# Patient Record
Sex: FEMALE | Race: WHITE | NOT HISPANIC OR LATINO | Employment: OTHER | URBAN - METROPOLITAN AREA
[De-identification: names, ages, dates, MRNs, and addresses within clinical notes are randomized per-mention and may not be internally consistent; named-entity substitution may affect disease eponyms.]

---

## 2017-02-17 ENCOUNTER — ANESTHESIA EVENT (OUTPATIENT)
Dept: SURGERY | Facility: CLINIC | Age: 82
End: 2017-02-17
Payer: MEDICARE

## 2017-02-17 ENCOUNTER — ANESTHESIA (OUTPATIENT)
Dept: SURGERY | Facility: CLINIC | Age: 82
End: 2017-02-17
Payer: MEDICARE

## 2017-02-17 ENCOUNTER — SURGERY (OUTPATIENT)
Age: 82
End: 2017-02-17

## 2017-02-17 ENCOUNTER — HOSPITAL ENCOUNTER (OUTPATIENT)
Facility: CLINIC | Age: 82
Discharge: HOME | End: 2017-02-17
Attending: OBSTETRICS & GYNECOLOGY | Admitting: OBSTETRICS & GYNECOLOGY
Payer: MEDICARE

## 2017-02-17 VITALS
RESPIRATION RATE: 15 BRPM | WEIGHT: 132.8 LBS | OXYGEN SATURATION: 95 % | DIASTOLIC BLOOD PRESSURE: 59 MMHG | SYSTOLIC BLOOD PRESSURE: 120 MMHG | HEIGHT: 64 IN | BODY MASS INDEX: 22.67 KG/M2 | TEMPERATURE: 97.3 F

## 2017-02-17 DIAGNOSIS — Z98.890 STATUS POST HYSTEROSCOPY: Primary | ICD-10-CM

## 2017-02-17 PROCEDURE — 25000125 ZZHC RX 250: Performed by: NURSE ANESTHETIST, CERTIFIED REGISTERED

## 2017-02-17 PROCEDURE — 36000058 ZZH SURGERY LEVEL 3 EA 15 ADDTL MIN: Performed by: OBSTETRICS & GYNECOLOGY

## 2017-02-17 PROCEDURE — 36000056 ZZH SURGERY LEVEL 3 1ST 30 MIN: Performed by: OBSTETRICS & GYNECOLOGY

## 2017-02-17 PROCEDURE — 88305 TISSUE EXAM BY PATHOLOGIST: CPT | Mod: 26 | Performed by: OBSTETRICS & GYNECOLOGY

## 2017-02-17 PROCEDURE — 40000170 ZZH STATISTIC PRE-PROCEDURE ASSESSMENT II: Performed by: OBSTETRICS & GYNECOLOGY

## 2017-02-17 PROCEDURE — 88305 TISSUE EXAM BY PATHOLOGIST: CPT | Performed by: OBSTETRICS & GYNECOLOGY

## 2017-02-17 PROCEDURE — 71000027 ZZH RECOVERY PHASE 2 EACH 15 MINS: Performed by: OBSTETRICS & GYNECOLOGY

## 2017-02-17 PROCEDURE — 71000013 ZZH RECOVERY PHASE 1 LEVEL 1 EA ADDTL HR: Performed by: OBSTETRICS & GYNECOLOGY

## 2017-02-17 PROCEDURE — 37000008 ZZH ANESTHESIA TECHNICAL FEE, 1ST 30 MIN: Performed by: OBSTETRICS & GYNECOLOGY

## 2017-02-17 PROCEDURE — 71000012 ZZH RECOVERY PHASE 1 LEVEL 1 FIRST HR: Performed by: OBSTETRICS & GYNECOLOGY

## 2017-02-17 PROCEDURE — 27210794 ZZH OR GENERAL SUPPLY STERILE: Performed by: OBSTETRICS & GYNECOLOGY

## 2017-02-17 PROCEDURE — 25800025 ZZH RX 258: Performed by: NURSE ANESTHETIST, CERTIFIED REGISTERED

## 2017-02-17 PROCEDURE — 37000009 ZZH ANESTHESIA TECHNICAL FEE, EACH ADDTL 15 MIN: Performed by: OBSTETRICS & GYNECOLOGY

## 2017-02-17 PROCEDURE — 25000128 H RX IP 250 OP 636: Performed by: NURSE ANESTHETIST, CERTIFIED REGISTERED

## 2017-02-17 RX ORDER — DEXAMETHASONE SODIUM PHOSPHATE 4 MG/ML
INJECTION, SOLUTION INTRA-ARTICULAR; INTRALESIONAL; INTRAMUSCULAR; INTRAVENOUS; SOFT TISSUE PRN
Status: DISCONTINUED | OUTPATIENT
Start: 2017-02-17 | End: 2017-02-17

## 2017-02-17 RX ORDER — PROPOFOL 10 MG/ML
INJECTION, EMULSION INTRAVENOUS PRN
Status: DISCONTINUED | OUTPATIENT
Start: 2017-02-17 | End: 2017-02-17

## 2017-02-17 RX ORDER — FENTANYL CITRATE 50 UG/ML
25-50 INJECTION, SOLUTION INTRAMUSCULAR; INTRAVENOUS
Status: DISCONTINUED | OUTPATIENT
Start: 2017-02-17 | End: 2017-02-17 | Stop reason: HOSPADM

## 2017-02-17 RX ORDER — FENTANYL CITRATE 50 UG/ML
25-50 INJECTION, SOLUTION INTRAMUSCULAR; INTRAVENOUS EVERY 5 MIN PRN
Status: DISCONTINUED | OUTPATIENT
Start: 2017-02-17 | End: 2017-02-17 | Stop reason: HOSPADM

## 2017-02-17 RX ORDER — PROPOFOL 10 MG/ML
INJECTION, EMULSION INTRAVENOUS CONTINUOUS PRN
Status: DISCONTINUED | OUTPATIENT
Start: 2017-02-17 | End: 2017-02-17

## 2017-02-17 RX ORDER — ONDANSETRON 2 MG/ML
4 INJECTION INTRAMUSCULAR; INTRAVENOUS EVERY 30 MIN PRN
Status: DISCONTINUED | OUTPATIENT
Start: 2017-02-17 | End: 2017-02-17 | Stop reason: HOSPADM

## 2017-02-17 RX ORDER — FENTANYL CITRATE 50 UG/ML
INJECTION, SOLUTION INTRAMUSCULAR; INTRAVENOUS PRN
Status: DISCONTINUED | OUTPATIENT
Start: 2017-02-17 | End: 2017-02-17

## 2017-02-17 RX ORDER — SODIUM CHLORIDE, SODIUM LACTATE, POTASSIUM CHLORIDE, CALCIUM CHLORIDE 600; 310; 30; 20 MG/100ML; MG/100ML; MG/100ML; MG/100ML
INJECTION, SOLUTION INTRAVENOUS CONTINUOUS
Status: DISCONTINUED | OUTPATIENT
Start: 2017-02-17 | End: 2017-02-17 | Stop reason: HOSPADM

## 2017-02-17 RX ORDER — PHYSOSTIGMINE SALICYLATE 1 MG/ML
1.2 INJECTION INTRAVENOUS
Status: DISCONTINUED | OUTPATIENT
Start: 2017-02-17 | End: 2017-02-17 | Stop reason: HOSPADM

## 2017-02-17 RX ORDER — IBUPROFEN 600 MG/1
600 TABLET, FILM COATED ORAL
Status: CANCELLED | OUTPATIENT
Start: 2017-02-17

## 2017-02-17 RX ORDER — EPHEDRINE SULFATE 50 MG/ML
INJECTION, SOLUTION INTRAMUSCULAR; INTRAVENOUS; SUBCUTANEOUS PRN
Status: DISCONTINUED | OUTPATIENT
Start: 2017-02-17 | End: 2017-02-17

## 2017-02-17 RX ORDER — ONDANSETRON 4 MG/1
4 TABLET, ORALLY DISINTEGRATING ORAL EVERY 30 MIN PRN
Status: DISCONTINUED | OUTPATIENT
Start: 2017-02-17 | End: 2017-02-17 | Stop reason: HOSPADM

## 2017-02-17 RX ORDER — HYDROMORPHONE HYDROCHLORIDE 1 MG/ML
.3-.5 INJECTION, SOLUTION INTRAMUSCULAR; INTRAVENOUS; SUBCUTANEOUS EVERY 10 MIN PRN
Status: DISCONTINUED | OUTPATIENT
Start: 2017-02-17 | End: 2017-02-17 | Stop reason: HOSPADM

## 2017-02-17 RX ORDER — NALOXONE HYDROCHLORIDE 0.4 MG/ML
.1-.4 INJECTION, SOLUTION INTRAMUSCULAR; INTRAVENOUS; SUBCUTANEOUS
Status: DISCONTINUED | OUTPATIENT
Start: 2017-02-17 | End: 2017-02-17 | Stop reason: HOSPADM

## 2017-02-17 RX ORDER — KETOROLAC TROMETHAMINE 30 MG/ML
INJECTION, SOLUTION INTRAMUSCULAR; INTRAVENOUS PRN
Status: DISCONTINUED | OUTPATIENT
Start: 2017-02-17 | End: 2017-02-17

## 2017-02-17 RX ORDER — SODIUM CHLORIDE, SODIUM LACTATE, POTASSIUM CHLORIDE, CALCIUM CHLORIDE 600; 310; 30; 20 MG/100ML; MG/100ML; MG/100ML; MG/100ML
INJECTION, SOLUTION INTRAVENOUS CONTINUOUS PRN
Status: DISCONTINUED | OUTPATIENT
Start: 2017-02-17 | End: 2017-02-17

## 2017-02-17 RX ORDER — MEPERIDINE HYDROCHLORIDE 25 MG/ML
12.5 INJECTION INTRAMUSCULAR; INTRAVENOUS; SUBCUTANEOUS
Status: DISCONTINUED | OUTPATIENT
Start: 2017-02-17 | End: 2017-02-17 | Stop reason: HOSPADM

## 2017-02-17 RX ADMIN — Medication 7.5 MG: at 07:48

## 2017-02-17 RX ADMIN — Medication 7.5 MG: at 07:54

## 2017-02-17 RX ADMIN — DEXAMETHASONE SODIUM PHOSPHATE 4 MG: 4 INJECTION, SOLUTION INTRAMUSCULAR; INTRAVENOUS at 07:35

## 2017-02-17 RX ADMIN — FENTANYL CITRATE 50 MCG: 50 INJECTION, SOLUTION INTRAMUSCULAR; INTRAVENOUS at 07:35

## 2017-02-17 RX ADMIN — PROPOFOL 150 MG: 10 INJECTION, EMULSION INTRAVENOUS at 07:35

## 2017-02-17 RX ADMIN — PROPOFOL 150 MCG/KG/MIN: 10 INJECTION, EMULSION INTRAVENOUS at 07:35

## 2017-02-17 RX ADMIN — FENTANYL CITRATE 50 MCG: 50 INJECTION, SOLUTION INTRAMUSCULAR; INTRAVENOUS at 07:45

## 2017-02-17 RX ADMIN — KETOROLAC TROMETHAMINE 30 MG: 30 INJECTION, SOLUTION INTRAMUSCULAR at 08:05

## 2017-02-17 RX ADMIN — Medication 7.5 MG: at 07:42

## 2017-02-17 RX ADMIN — SODIUM CHLORIDE, POTASSIUM CHLORIDE, SODIUM LACTATE AND CALCIUM CHLORIDE: 600; 310; 30; 20 INJECTION, SOLUTION INTRAVENOUS at 07:32

## 2017-02-17 NOTE — IP AVS SNAPSHOT
MRN:[id number]                      After Visit Summary   2/17/2017    Audra Hayden    MRN: [id number]           Thank you!     Thank you for choosing [facility] for your care. Our goal is always to provide you with excellent care. Hearing back from our patients is one way we can continue to improve our services. Please take a few minutes to complete the written survey that you may receive in the mail after you visit with us. Thank you!        Patient Information     Date Of Birth          1935        About your hospital stay     You were admitted on:  February 17, 2017 You last received care in the:  [facility] Same Day [facility]    You were discharged on:  February 17, 2017       Who to Call     For medical emergencies, please call 911.  For non-urgent questions about your medical care, please call your primary care provider or clinic, [phone]  For questions related to your surgery, please call your surgery clinic        Attending Provider     Provider Specialty    Clary Hernandez MD OB/Gyn       Primary Care Provider Office Phone # Fax #    [facility] [phone]       [address] [place] [address], [address]  [facility] [address]        After Care Instructions     Discharge Instructions       Resume pre procedure diet            Discharge Instructions       Patient to arrange follow up appointment in 2  weeks            No alcohol       NO ALCOHOL for 24 hours post procedure            No driving or operating machinery       No driving or operating machinery until day after procedure            Shower        Shower on Post-op day  1.   DO NOT take a bath                  Your next 10 appointments already scheduled     May 15, 2017  9:00 AM CDT   Level O with [facility] 2   [facility] ([facility])    [facility]  [address]  [facility] [address]   [phone]             May 17, 2017  9:00 AM CDT   Return Visit with Eloina Gross MD   [facility] ([facility])    [facility]  [address]  [place] [address]   [phone]              Further instructions from your care team       While you were at the hospital today you received Toradol, an antiinflammatory medication similar to Ibuprofen.  You should not take other antiinflammatory medication, such as Ibuprofen, Motrin, Advil, Aleve, Naprosyn, etc, until 2:00 PM.     Same Day Surgery Discharge Instructions for  Sedation and General Anesthesia       It's not unusual to feel dizzy, light-headed or faint for up to 24 hours after surgery or while taking pain medication.  If you have these symptoms: sit for a few minutes before standing and have someone assist you when you get up to walk or use the bathroom.      You should rest and relax for the next 24 hours. We recommend you make arrangements to have an adult stay with you for at least 24 hours after your discharge.  Avoid hazardous and strenuous activity.      DO NOT DRIVE any vehicle or operate mechanical equipment for 24 hours following the end of your surgery.  Even though you may feel normal, your reactions may be affected by the medication you have received.      Do not drink alcoholic beverages for 24 hours following surgery.       Slowly progress to your regular diet as you feel able. It's not unusual to feel nauseated and/or vomit after receiving anesthesia.  If you develop these symptoms, drink clear liquids (apple juice, ginger ale, broth, 7-up, etc. ) until you feel better.  If your nausea and vomiting persists for 24 hours, please notify your surgeon.        All narcotic pain medications, along with inactivity and anesthesia, can cause constipation. Drinking plenty of liquids and increasing fiber intake will help.      For any questions of a medical nature, call your surgeon.      Do not make important  decisions for 24 hours.      If you had general anesthesia, you may have a sore throat for a couple of days related to the breathing tube used during surgery.  You may use Cepacol lozenges to help with this discomfort.  If it worsens or if you develop a fever, contact your surgeon.       If you feel your pain is not well managed with the pain medications prescribed by your surgeon, please contact your surgeon's office to let them know so they can address your concerns.     [facility]  D & C Surgery  Discharge Instructions  ACTIVITY:   You may resume normal activities including lifting as needed. It is permissible to drive a car and to climb stairs. Baths or showers are perfectly acceptable.   CHECK-UP:   You should be seen 1 month after discharge unless home instruction sheet states otherwise. Please phone the office the day after procedure and schedule an appointment with your physician.  VAGINAL DISCHARGE:   You may have some vaginal bleeding or discharge for about a week after discharge. You should avoid douches, tampons, and intercourse for the first week.  TEMPERATURE:   If you develop temperature levels to over 100.4 , your physician should be called immediately.  STITCHES:   There is usually a stitch under the skin incisions which will dissolve and does not need to be removed. The bandaids may be removed at any time.  DIET:  [place] or light diet is advisable the day of surgery. If nausea persists, continue this diet. If the nausea is severe, call the physician.  [facility]  [address] [facility], [address]  [place] [address]  [phone]    .  .                    Pending Results     Date and Time Order Name Status Description    2/17/2017 0759 Surgical pathology exam In process             Admission Information     Date & Time Provider Department Dept. Phone    2/17/2017 Clary Hernandez MD [facility] Same Day Surgery [phone]      Your Vitals Were     Blood  "Pressure Temperature Respirations Height Weight Pulse Oximetry    120/59 97.4  F (36.3  C) (Temporal) 15 1.626 m (5' 4\") 60.2 kg (132 lb 12.8 oz) 95%    BMI (Body Mass Index)                   22.8 kg/m2           [employer] Information     [employer] lets you send messages to your doctor, view your test results, renew your prescriptions, schedule appointments and more. To sign up, go to [email or web address] . Click on \"Log in\" on the left side of the screen, which will take you to the Welcome page. Then click on \"Sign up Now\" on the right side of the page.     You will be asked to enter the access code listed below, as well as some personal information. Please follow the directions to create your username and password.     Your access code is: [email or web address]  Expires: 2017  8:46 AM     Your access code will  in 90 days. If you need help or a new code, please call your [place] clinic or [phone].        Care EveryWhere ID     This is your Care EveryWhere ID. This could be used by other organizations to access your [facility] [place] medical records  [id number]           Review of your medicines      CONTINUE these medicines which may have CHANGED, or have new prescriptions. If we are uncertain of the size of tablets/capsules you have at home, strength may be listed as something that might have changed.        Dose / Directions    potassium chloride 20 MEQ CR tablet   This may have changed:  See the new instructions.   Used for:  Hypertension goal BP (blood pressure) < 130/80   Generic drug:  potassium chloride SA        TAKE 1 TABLET BY MOUTH 2 TIMES DAILY.   Quantity:  60 tablet   Refills:  2         CONTINUE these medicines which have NOT CHANGED        Dose / Directions    ALEVE PO        Dose:  220 mg   Take 220 mg by mouth as needed for moderate pain For headaches   Refills:  0       CALCIUM + D PO        Dose:  2 capsule   Take 2 capsules by mouth daily   Refills:  0       denosumab 60 MG/ML Soln injection "   Commonly known as:  PROLIA        Dose:  60 mg   Inject 60 mg Subcutaneous every 6 months   Refills:  0       MAGNESIUM OXIDE PO        Dose:  400 mg   Take 400 mg by mouth as needed for leg cramps   Refills:  0       MECLIZINE HCL PO        Dose:  25 mg   Take 25 mg by mouth as needed for dizziness For dizziness   Refills:  0       tamoxifen 20 MG tablet   Commonly known as:  NOLVADEX   Used for:  Breast cancer, right (H)        Dose:  20 mg   Take 1 tablet (20 mg) by mouth daily   Quantity:  90 tablet   Refills:  0       triamterene-hydrochlorothiazide 37.5-25 MG per capsule   Commonly known as:  DYAZIDE   Used for:  Hypertension        TAKE 1 CAPSULE BY MOUTH DAILY.   Quantity:  90 capsule   Refills:  0       verapamil 240 MG Cp24 24 hr capsule   Commonly known as:  VERELAN   Used for:  Hypertension        TAKE 1 CAPSULE (240 MG) BY ORAL ROUTE ONCE DAILY   Quantity:  90 capsule   Refills:  0       vitamin D 1000 UNITS capsule        Dose:  1000 Units   Take 1,000 Units by mouth daily 1-2 daily   Refills:  0                Protect others around you: Learn how to safely use, store and throw away your medicines at www.disposemymeds.org.             Medication List: This is a list of all your medications and when to take them. Check marks below indicate your daily home schedule. Keep this list as a reference.      Medications           Morning Afternoon Evening Bedtime As Needed    ALEVE PO   Take 220 mg by mouth as needed for moderate pain For headaches                                CALCIUM + D PO   Take 2 capsules by mouth daily                                denosumab 60 MG/ML Soln injection   Commonly known as:  PROLIA   Inject 60 mg Subcutaneous every 6 months                                MAGNESIUM OXIDE PO   Take 400 mg by mouth as needed for leg cramps                                MECLIZINE HCL PO   Take 25 mg by mouth as needed for dizziness For dizziness                                potassium  chloride 20 MEQ CR tablet   TAKE 1 TABLET BY MOUTH 2 TIMES DAILY.   Generic drug:  potassium chloride SA                                tamoxifen 20 MG tablet   Commonly known as:  NOLVADEX   Take 1 tablet (20 mg) by mouth daily                                triamterene-hydrochlorothiazide 37.5-25 MG per capsule   Commonly known as:  DYAZIDE   TAKE 1 CAPSULE BY MOUTH DAILY.                                verapamil 240 MG Cp24 24 hr capsule   Commonly known as:  VERELAN   TAKE 1 CAPSULE (240 MG) BY ORAL ROUTE ONCE DAILY                                vitamin D 1000 UNITS capsule   Take 1,000 Units by mouth daily 1-2 daily

## 2017-02-17 NOTE — OP NOTE
Audra Hayden  1935 2/17/2017      Preop Dx: endometrial irregularity  Tamoxifen use  Cervical stenosis    Postop Dx:  Same                     Procedure:  Dilatation, Curettage, Hysteroscopy, myosure morcellation    Anesthesia:  General    Surgeon:  Clary Hernandez MD    Procedure:  The patient was brought to the operating room where following general anesthesia was placed in the dorsolithotomy position where she was prepped and draped.  Her bladder was emptied of approximately 100 cc clear urine.      A speculum was placed.  The cervix was grasped with a tenaculum.  An endocervical curettage was performed.  The uterus was sounded to 7 cm.  The cervix was dilated to accommodate the hysteroscope.  Under direct visualization the hysteroscope was placed into the endometrial cavity with the findings of irregular endometrial cavity    The endometrial cavity was curetted directly using the myosure device. The specimens were submitted to pathology for microscopic analysis.    The instruments were removed from the vagina and all areas were hemostatic.  The amount of fluid in 5572 ml, out was 4672 ml for a deficit of 900 cc.  The estimated blood loss was 25 cc.  All sponge, needle, and instrument counts were correct.  The patient was awakened and removed to the recovery room in stable condition.    Clary Hernandez  February 17, 2017

## 2017-02-17 NOTE — DISCHARGE INSTRUCTIONS
While you were at the hospital today you received Toradol, an antiinflammatory medication similar to Ibuprofen.  You should not take other antiinflammatory medication, such as Ibuprofen, Motrin, Advil, Aleve, Naprosyn, etc, until 2:00 PM.     Same Day Surgery Discharge Instructions for  Sedation and General Anesthesia       It's not unusual to feel dizzy, light-headed or faint for up to 24 hours after surgery or while taking pain medication.  If you have these symptoms: sit for a few minutes before standing and have someone assist you when you get up to walk or use the bathroom.      You should rest and relax for the next 24 hours. We recommend you make arrangements to have an adult stay with you for at least 24 hours after your discharge.  Avoid hazardous and strenuous activity.      DO NOT DRIVE any vehicle or operate mechanical equipment for 24 hours following the end of your surgery.  Even though you may feel normal, your reactions may be affected by the medication you have received.      Do not drink alcoholic beverages for 24 hours following surgery.       Slowly progress to your regular diet as you feel able. It's not unusual to feel nauseated and/or vomit after receiving anesthesia.  If you develop these symptoms, drink clear liquids (apple juice, ginger ale, broth, 7-up, etc. ) until you feel better.  If your nausea and vomiting persists for 24 hours, please notify your surgeon.        All narcotic pain medications, along with inactivity and anesthesia, can cause constipation. Drinking plenty of liquids and increasing fiber intake will help.      For any questions of a medical nature, call your surgeon.      Do not make important decisions for 24 hours.      If you had general anesthesia, you may have a sore throat for a couple of days related to the breathing tube used during surgery.  You may use Cepacol lozenges to help with this discomfort.  If it worsens or if you develop a fever, contact your surgeon.        If you feel your pain is not well managed with the pain medications prescribed by your surgeon, please contact your surgeon's office to let them know so they can address your concerns.     [facility]  D & C Surgery  Discharge Instructions  ACTIVITY:   You may resume normal activities including lifting as needed. It is permissible to drive a car and to climb stairs. Baths or showers are perfectly acceptable.   CHECK-UP:   You should be seen 1 month after discharge unless home instruction sheet states otherwise. Please phone the office the day after procedure and schedule an appointment with your physician.  VAGINAL DISCHARGE:   You may have some vaginal bleeding or discharge for about a week after discharge. You should avoid douches, tampons, and intercourse for the first week.  TEMPERATURE:   If you develop temperature levels to over 100.4 , your physician should be called immediately.  STITCHES:   There is usually a stitch under the skin incisions which will dissolve and does not need to be removed. The bandaids may be removed at any time.  DIET:  [place] or light diet is advisable the day of surgery. If nausea persists, continue this diet. If the nausea is severe, call the physician.  [facility]  [address], [address]  [place], [place] [address]  [phone]    .  .

## 2017-02-17 NOTE — IP AVS SNAPSHOT
[facility] Same Day [facility]    [address]    [place] [address]    Phone:  [phone]    Fax:  [phone]                                       After Visit Summary   2/17/2017    Audra Hayden    MRN: [id number]           After Visit Summary Signature Page     I have received my discharge instructions, and my questions have been answered. I have discussed any challenges I see with this plan with the nurse or doctor.    ..........................................................................................................................................  Patient/Patient Representative Signature      ..........................................................................................................................................  Patient Representative Print Name and Relationship to Patient    ..................................................               ................................................  Date                                            Time    ..........................................................................................................................................  Reviewed by Signature/Title    ...................................................              ..............................................  Date                                                            Time

## 2017-02-17 NOTE — ANESTHESIA CARE TRANSFER NOTE
Patient: Audra Hayden    Procedure(s):  HYSTEROSCOPY, DILATION AND CURETTAGE WITH MYOSURE  - Wound Class: II-Clean Contaminated    Diagnosis: ENDOMETRIAL MASS AND HX OF TAMOXOFIN USE   Diagnosis Additional Information: No value filed.    Anesthesia Type:   General     Note:  Airway :Face Mask  Patient transferred to:PACU        Vitals: (Last set prior to Anesthesia Care Transfer)    CRNA VITALS  2/17/2017 0741 - 2/17/2017 0814      2/17/2017             Pulse: 78    SpO2: 99 %    Resp Rate (set): 10                Electronically Signed By: LOKESH Webb CRNA  February 17, 2017  8:14 AM

## 2017-02-17 NOTE — ANESTHESIA POSTPROCEDURE EVALUATION
Patient: Audra Hayden    Procedure(s):  HYSTEROSCOPY, DILATION AND CURETTAGE WITH MYOSURE  - Wound Class: II-Clean Contaminated    Diagnosis:ENDOMETRIAL MASS AND HX OF TAMOXOFIN USE   Diagnosis Additional Information: No value filed.    Anesthesia Type:  General    Note:  Anesthesia Post Evaluation    Patient location during evaluation: PACU  Patient participation: Able to fully participate in evaluation  Level of consciousness: awake  Pain management: adequate  Airway patency: patent  Cardiovascular status: acceptable  Respiratory status: acceptable  Hydration status: acceptable  PONV: controlled     Anesthetic complications: None          Last vitals:  Vitals:    02/17/17 0830 02/17/17 0845 02/17/17 0915   BP: 114/60 120/59    Resp: 12 15    Temp:   36.3  C (97.3  F)   SpO2: 98% 95%          Electronically Signed By: Willie Santiago MD  February 17, 2017  9:27 AM

## 2017-02-17 NOTE — ANESTHESIA PREPROCEDURE EVALUATION
Anesthesia Evaluation     . Pt has had prior anesthetic. Type: General    History of anesthetic complications  - PONV    ROS/MED HX    ENT/Pulmonary:       Neurologic:     (+)migraines,     Cardiovascular:     (+) hypertension----. : . . . :. .       METS/Exercise Tolerance:     Hematologic:         Musculoskeletal:         GI/Hepatic:         Renal/Genitourinary:         Endo:         Psychiatric:         Infectious Disease:         Malignancy:   (+) Malignancy History of Breast          Other:                              Anesthesia Plan      History & Physical Review  History and physical reviewed and following examination; no interval change.    ASA Status:  3 .        Plan for General with Intravenous induction. Maintenance will be Inhalation.    PONV prophylaxis:  Ondansetron (or other 5HT-3) and Dexamethasone or Solumedrol  Propofol, Zofran, and decadron      Postoperative Care  Postoperative pain management:  IV analgesics and Oral pain medications.      Consents  Anesthetic plan, risks, benefits and alternatives discussed with:  Patient..                          .

## 2017-02-20 LAB — COPATH REPORT: NORMAL

## 2017-05-15 ENCOUNTER — ONCOLOGY VISIT (OUTPATIENT)
Dept: ONCOLOGY | Facility: CLINIC | Age: 82
End: 2017-05-15
Payer: MEDICARE

## 2017-05-15 ENCOUNTER — HOSPITAL ENCOUNTER (OUTPATIENT)
Facility: CLINIC | Age: 82
Setting detail: SPECIMEN
Discharge: HOME | End: 2017-05-15
Attending: INTERNAL MEDICINE | Admitting: INTERNAL MEDICINE
Payer: MEDICARE

## 2017-05-15 DIAGNOSIS — Z78.0 MENOPAUSE: ICD-10-CM

## 2017-05-15 DIAGNOSIS — Z85.3 HISTORY OF BREAST CANCER: ICD-10-CM

## 2017-05-15 LAB
ALBUMIN SERPL-MCNC: 3.7 G/DL (ref 3.4–5)
ALP SERPL-CCNC: 42 U/L (ref 40–150)
ALT SERPL W P-5'-P-CCNC: 18 U/L (ref 0–50)
AST SERPL W P-5'-P-CCNC: 19 U/L (ref 0–45)
BILIRUB DIRECT SERPL-MCNC: <0.1 MG/DL (ref 0–0.2)
BILIRUB SERPL-MCNC: 0.4 MG/DL (ref 0.2–1.3)
CANCER AG27-29 SERPL-ACNC: 21 U/ML (ref 0–39)
PROT SERPL-MCNC: 7.2 G/DL (ref 6.8–8.8)

## 2017-05-15 PROCEDURE — 36415 COLL VENOUS BLD VENIPUNCTURE: CPT

## 2017-05-15 PROCEDURE — 80076 HEPATIC FUNCTION PANEL: CPT | Performed by: INTERNAL MEDICINE

## 2017-05-15 PROCEDURE — 86300 IMMUNOASSAY TUMOR CA 15-3: CPT | Performed by: INTERNAL MEDICINE

## 2017-05-15 NOTE — MR AVS SNAPSHOT
"              After Visit Summary   5/15/2017    Audra Hayden    MRN: [id number]           Patient Information     Date Of Birth          1935        Visit Information        Provider Department      5/15/2017 9:00 AM Nurse, [facility]        Today's Diagnoses     Menopause        History of breast cancer           Follow-ups after your visit        Your next 10 appointments already scheduled     May 17, 2017  9:00 AM CDT   Return Visit with Eloina Gross MD   [facility] ([facility])    [facility]  [address] [facility] [address]  [facility] [id number] [phone]              Who to contact     If you have questions or need follow up information about today's clinic visit or your schedule please contact [facility] directly at [phone].  Normal or non-critical lab and imaging results will be communicated to you by [employer], letter or phone within 4 business days after the clinic has received the results. If you do not hear from us within 7 days, please contact the clinic through MyChart or phone. If you have a critical or abnormal lab result, we will notify you by phone as soon as possible.  Submit refill requests through Sprout Route or call your pharmacy and they will forward the refill request to us. Please allow 3 business days for your refill to be completed.          Additional Information About Your Visit        [employer] Information     Sprout Route lets you send messages to your doctor, view your test results, renew your prescriptions, schedule appointments and more. To sign up, go to [email or web address] . Click on \"Log in\" on the left side of the screen, which will take you to the Welcome page. Then click on \"Sign up Now\" on the right side of the page.     You will be asked to enter the access code listed below, as well as some personal information. Please follow the directions to create your username and password.     Your " access code is: [email or web address]  Expires: 2017  9:46 AM     Your access code will  in 90 days. If you need help or a new code, please call your [facility] or [phone].        Care EveryWhere ID     This is your Care EveryWhere ID. This could be used by other organizations to access your [place] medical records  [id number]         Blood Pressure from Last 3 Encounters:   17 120/59   16 128/64   16 128/64    Weight from Last 3 Encounters:   17 60.2 kg (132 lb 12.8 oz)   16 62.4 kg (137 lb 9.6 oz)   16 62.4 kg (137 lb 9.6 oz)              We Performed the Following     Ca27.29  breast tumor marker     Hepatic panel          Today's Medication Changes          These changes are accurate as of: 5/15/17  9:04 AM.  If you have any questions, ask your nurse or doctor.               These medicines have changed or have updated prescriptions.        Dose/Directions    potassium chloride 20 MEQ CR tablet   This may have changed:  See the new instructions.   Used for:  Hypertension goal BP (blood pressure) < 130/80   Generic drug:  potassium chloride SA        TAKE 1 TABLET BY MOUTH 2 TIMES DAILY.   Quantity:  60 tablet   Refills:  2                Primary Care Provider Office Phone # Fax #    [facility] [phone]       [address] [place] [address], [address] [place] [id number]        Thank you!     Thank you for choosing [facility]  for your care. Our goal is always to provide you with excellent care. Hearing back from our patients is one way we can continue to improve our services. Please take a few minutes to complete the written survey that you may receive in the mail after your visit with us. Thank you!             Your Updated Medication List - Protect others around you: Learn how to safely use, store and throw away your medicines at www.disposemymeds.org.          This list is accurate as of: 5/15/17  9:04 AM.  Always  use your most recent med list.                   Brand Name Dispense Instructions for use    ALEVE PO      Take 220 mg by mouth as needed for moderate pain For headaches       CALCIUM + D PO      Take 2 capsules by mouth daily       denosumab 60 MG/ML Soln injection    PROLIA     Inject 60 mg Subcutaneous every 6 months       MAGNESIUM OXIDE PO      Take 400 mg by mouth as needed for leg cramps       MECLIZINE HCL PO      Take 25 mg by mouth as needed for dizziness For dizziness       potassium chloride 20 MEQ CR tablet   Generic drug:  potassium chloride SA     60 tablet    TAKE 1 TABLET BY MOUTH 2 TIMES DAILY.       tamoxifen 20 MG tablet    NOLVADEX    90 tablet    Take 1 tablet (20 mg) by mouth daily       triamterene-hydrochlorothiazide 37.5-25 MG per capsule    DYAZIDE    90 capsule    TAKE 1 CAPSULE BY MOUTH DAILY.       verapamil 240 MG Cp24 24 hr capsule    VERELAN    90 capsule    TAKE 1 CAPSULE (240 MG) BY ORAL ROUTE ONCE DAILY       vitamin D 1000 UNITS capsule      Take 1,000 Units by mouth daily 1-2 daily

## 2017-05-17 ENCOUNTER — ONCOLOGY VISIT (OUTPATIENT)
Dept: ONCOLOGY | Facility: CLINIC | Age: 82
End: 2017-05-17
Payer: COMMERCIAL

## 2017-05-17 VITALS
SYSTOLIC BLOOD PRESSURE: 106 MMHG | OXYGEN SATURATION: 99 % | WEIGHT: 133 LBS | HEART RATE: 67 BPM | DIASTOLIC BLOOD PRESSURE: 64 MMHG | TEMPERATURE: 98 F | BODY MASS INDEX: 22.83 KG/M2 | RESPIRATION RATE: 12 BRPM

## 2017-05-17 DIAGNOSIS — M81.0 OSTEOPOROSIS: ICD-10-CM

## 2017-05-17 DIAGNOSIS — Z12.31 SCREENING MAMMOGRAM, ENCOUNTER FOR: ICD-10-CM

## 2017-05-17 DIAGNOSIS — Z85.3 HISTORY OF BREAST CANCER: Primary | ICD-10-CM

## 2017-05-17 DIAGNOSIS — R93.89 THICKENED ENDOMETRIUM: ICD-10-CM

## 2017-05-17 PROCEDURE — 99214 OFFICE O/P EST MOD 30 MIN: CPT | Performed by: INTERNAL MEDICINE

## 2017-05-17 PROCEDURE — 99211 OFF/OP EST MAY X REQ PHY/QHP: CPT

## 2017-05-17 ASSESSMENT — PAIN SCALES - GENERAL: PAINLEVEL: NO PAIN (0)

## 2017-05-17 NOTE — PROGRESS NOTES
Hematology/Oncology Follow-up Visit:     Primary Care Physician- Dr.Heidi Pablo    REASON FOR VISIT: Right-sided breast cancer  T1cN0 s/p lumpectomy, RT, on Tamoxifen.    HISTORY OF PRESENT ILLNESS: She was diagnosed in 2012 at age 77 via MA right breast cancer which revealed a mass of 1.1 cm. Biopsy done on 2012 revealed an ER/AL positive and grade 2 invasive ductal carcinoma. She then had on 2012 a right-sided lumpectomy and sentinel lymph node biopsy and pathology revealed an invasive ductal carcinoma, 1.3 cm, grade 2, margins negative, negative sentinel lymph nodes. Staging was pT1c pN0. HER2/maya by FISH was negative. Oncotype DX was 17, low risk. She had Radiation done on 10/1/12. She started Femara on 10/08/2012 and switched to Arimidex on 13 due to bony pain, then changed to tamoxifen end of 2014.    PAST MEDICAL HISTORY:  Hypertension, high cholesterol, osteoporosis, SCC and basal Cell Ca of skin  SURGERIES: Two back disk surgeries in the back, blepharoplasty, appendectomy, history of sinus surgery, history of Mohs procedure for basal cell carcinoma, left foot pin placement.   ALLERGIES: She had tremors and involuntary movements with Zofran. She had hives and throat swelling with contrast. Sulfa drugs cause a rash.   MEDICATIONS: reviewed  SOCIAL HISTORY: She is , lives in [place]. Is a never smoker and does not drink alcohol. 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   FAMILY HISTORY: A sister had a mastectomy after being diagnosed with breast cancer at the age of 50. She has 2 brothers who  of cancer, one with a brain tumor and the other with a metastatic cancer probably of head and neck etiology.     REVIEW OF SYSTEMS: Energy levels is fair.   She had another Gyn exam found polyps and had D/C in 2017 with negative biopsy.    She is very energetic.    PHYSICAL  EXAMINATION:   VITAL SIGNS: Blood pressure 106/64, pulse 67, temperature 98  F (36.7  C), temperature source Oral, resp. rate 12, weight 60.3 kg (133 lb), SpO2 99 %.  HEENT: Head is normocephalic, atraumatic. Eyes: PERRLA, EOMI, no icterus or pallor noted. Oropharyngeal examination was clear.   NECK: Supple, no masses felt in the bilateral cervical or supraclavicular area.   LUNGS: Clear, no wheezing or ronchi  CARDIOVASCULAR: Rate and rhythm was regular, no murmur or gallop  ABDOMEN: Soft, nontender, nondistended, no organomegaly.   BREASTS:. Bilateral axillae negative for lymphadenopathy. [place] lymph node biopsy site healing well. Right breast changes from a lumpectomy. Mild tenderness, no bruising, scar well-healed. No lumps or bumps felt in either breast:   ABDOMEN: Soft, nontender, nondistended, no organomegaly. Bowel sounds heard and normal.   EXTREMITIES: Warm and no edema, no sign of cellulitis  NEUROLOGIC: sensation intact, muscle strength and tone symmetrical all through    Current LAB Data  5/2017 LFT/marker: good    2/2017 Endometrium, curettage   - Benign endocervical mucosa, endometrial tissue not identified     CURRENT IMAGE  11/2016 dexa: slight improved osteopenia.   7/2016 MA - NEGATIVE.    Old data review and summary  Bone Scan 8/28/13  1. Degenerative change in the lower lumbar spine and right wrist.   2. Small focus of increased activity left. Alignment posterior frontal calvarium with possible plain film correlation showing a 1 cm sclerotic focus. This is of uncertain significance, but likelihood of osseous metastasis is considered very low - MM work up were negative.   Mammogram 7/29/13 - The breast parenchyma is heterogeneously dense. Post lumpectomy scarring in the upper outer right breast. There is mild skin thickening in the right breast which has slightly decreased. The   left breast is unchanged. No mammographic finding of suspicion.     Dexa noted (outside report-2/3/14)-  Osteoporosis (had osteopenia on 2013 Dexa).     A/p  1. [id number] right breast Ca 2012 s/p lumpectomy, RT, RS 17, s/p femera, then arimidex, then tamoxifen due to bone pain.  Continue oral antihormone therapy, ideally 5 yrs till 10/2017 pending tolerability.   We talked about the side effects and how she should be monitored.   We discuss the concern of endometrium ca from tamoxifen use. I discussed with  Dr. Arrington's input (see below), so will continue tamoxifen at this point.   She is due 6 months f/u with labs. MA is due July 2. Osteoporosis, h/o falls and dizziness and balance issues.Started Prolia 60 mg sc q 6 months. Total 6 doses till fall 2016. We talked about the proper vit D intake. She is due Dexa.     3. Endometrium polyps/ thickening by gyn exam with negative biopsy and she also had D/C with Dr. Aziza Arrington in 4/2015 and again in 2/2017.    I discussed with Dr. Arrington, who feel she is safe to continue on tamoxifen. She is advised on follow up with Dr. Arrington q 6 months as long as she is on tamoxifen.

## 2017-05-17 NOTE — MR AVS SNAPSHOT
After Visit Summary   5/17/2017    Audra Hayden    MRN: [id number]           Patient Information     Date Of Birth          1935        Visit Information        Provider Department      5/17/2017 9:00 AM Eloina Gross MD [facility]        Today's Diagnoses     History of breast cancer    -  1    Thickened endometrium        Osteoporosis        Screening mammogram, encounter for          Care Instructions    Due MA in July, 6 months f/u with labs        Follow-ups after your visit        Your next 10 appointments already scheduled     Jul 19, 2017  9:45 AM CDT   MA SCREENING DIGITAL BILATERAL with BCMA2   [facility] [place] ([facility])    [address] [place] [address], [address]  [facility] [address] [phone]           Do not use any powder, lotion or deodorant under your arms or on your breast. If you do, we will ask you to remove it before your exam.  Wear comfortable, two-piece clothing.  If you have any allergies, tell your care team.  Bring any previous mammograms from other facilities or have them mailed to the breast center. This mammogram location, [facility], now offers 3D mammography. It doesn't replace a screening mammogram and can be done with a regular screening mammogram. It is optional and not all insurances will pay for it. 3D mammography is a special kind of mammogram that produces a three-dimensional image of the breast by using low dose-xrays. 3D allows the radiologist to see the breast tissue differently from 2D, which reduces the chance of repeat testing due to overlapping breast tissue. If you are interested in have a 3D mammogram, please check with your insurance before you arrive for your exam. On the day of your exam you will be asked if you would like 3D imaging.            Nov 13, 2017  9:00 AM CST   Return Visit with  Oncology Nurse   [facility] ([facility])    n  "[facility]tr [facility]  [address]  [place] [address]   [phone]            Nov 15, 2017  9:00 AM CST   Return Visit with Eloina Gross MD   [facility] ([facility])    [facility]  [address]  [place] [address]   [phone]              Future tests that were ordered for you today     Open Future Orders        Priority Expected Expires Ordered    Ca27.29  breast tumor marker Routine 11/1/2017 12/29/2017 5/17/2017    Hepatic panel Routine 11/1/2017 12/29/2017 5/17/2017    MA Screening Digital Bilateral Routine 7/1/2017 5/17/2018 5/17/2017            Who to contact     If you have questions or need follow up information about today's clinic visit or your schedule please contact [facility] directly at [phone].  Normal or non-critical lab and imaging results will be communicated to you by [employer], letter or phone within 4 business days after the clinic has received the results. If you do not hear from us within 7 days, please contact the clinic through Lingodat or phone. If you have a critical or abnormal lab result, we will notify you by phone as soon as possible.  Submit refill requests through FreeAgent or call your pharmacy and they will forward the refill request to us. Please allow 3 business days for your refill to be completed.          Additional Information About Your Visit        [employer] Information     FreeAgent lets you send messages to your doctor, view your test results, renew your prescriptions, schedule appointments and more. To sign up, go to [email or web address] . Click on \"Log in\" on the left side of the screen, which will take you to the Welcome page. Then click on \"Sign up Now\" on the right side of the page.     You will be asked to enter the access code listed below, as well as some personal information. Please follow the directions to create your username and password.     Your access code " is: [email or web address]  Expires: 2017  9:46 AM     Your access code will  in 90 days. If you need help or a new code, please call your [facility] or [phone].        Care EveryWhere ID     This is your Care EveryWhere ID. This could be used by other organizations to access your [place] medical records  [id number]        Your Vitals Were     Pulse Temperature Respirations Pulse Oximetry BMI (Body Mass Index)       67 98  F (36.7  C) (Oral) 12 99% 22.83 kg/m2        Blood Pressure from Last 3 Encounters:   17 106/64   17 120/59   16 128/64    Weight from Last 3 Encounters:   17 60.3 kg (133 lb)   17 60.2 kg (132 lb 12.8 oz)   16 62.4 kg (137 lb 9.6 oz)                 Today's Medication Changes          These changes are accurate as of: 17 11:59 PM.  If you have any questions, ask your nurse or doctor.               These medicines have changed or have updated prescriptions.        Dose/Directions    potassium chloride 20 MEQ CR tablet   This may have changed:  See the new instructions.   Used for:  Hypertension goal BP (blood pressure) < 130/80   Generic drug:  potassium chloride SA        TAKE 1 TABLET BY MOUTH 2 TIMES DAILY.   Quantity:  60 tablet   Refills:  2                Primary Care Provider Office Phone # Fax #    [place] [facility] [phone]       [address] [place] [address], [address]  [facility] [address]        Thank you!     Thank you for choosing [facility]  for your care. Our goal is always to provide you with excellent care. Hearing back from our patients is one way we can continue to improve our services. Please take a few minutes to complete the written survey that you may receive in the mail after your visit with us. Thank you!             Your Updated Medication List - Protect others around you: Learn how to safely use, store and throw away your medicines at www.disposemymeds.org.          This list is  accurate as of: 5/17/17 11:59 PM.  Always use your most recent med list.                   Brand Name Dispense Instructions for use    ALEVE PO      Take 220 mg by mouth as needed for moderate pain For headaches       CALCIUM + D PO      Take 2 capsules by mouth daily       denosumab 60 MG/ML Soln injection    PROLIA     Inject 60 mg Subcutaneous every 6 months Reported on 5/17/2017       MAGNESIUM OXIDE PO      Take 400 mg by mouth as needed for leg cramps       MECLIZINE HCL PO      Take 25 mg by mouth as needed for dizziness For dizziness       potassium chloride 20 MEQ CR tablet   Generic drug:  potassium chloride SA     60 tablet    TAKE 1 TABLET BY MOUTH 2 TIMES DAILY.       tamoxifen 20 MG tablet    NOLVADEX    90 tablet    Take 1 tablet (20 mg) by mouth daily       triamterene-hydrochlorothiazide 37.5-25 MG per capsule    DYAZIDE    90 capsule    TAKE 1 CAPSULE BY MOUTH DAILY.       verapamil 240 MG Cp24 24 hr capsule    VERELAN    90 capsule    TAKE 1 CAPSULE (240 MG) BY ORAL ROUTE ONCE DAILY       vitamin D 1000 UNITS capsule      Take 1,000 Units by mouth daily 1-2 daily

## 2017-05-17 NOTE — PROGRESS NOTES
"Oncology Rooming Note    May 17, 2017 9:04 AM   Audra Hayden is a 82 year old female who presents for:    Chief Complaint   Patient presents with     Oncology Clinic Visit     breast cancer     Initial Vitals: There were no vitals taken for this visit. Estimated body mass index is 22.8 kg/(m^2) as calculated from the following:    Height as of 2/17/17: 1.626 m (5' 4\").    Weight as of 2/17/17: 60.2 kg (132 lb 12.8 oz). There is no height or weight on file to calculate BSA.  Data Unavailable Comment: Data Unavailable   No LMP recorded. Patient is postmenopausal.  Allergies reviewed: Yes  Medications reviewed: Yes    Medications: Medication refills not needed today.  Pharmacy name entered into Webspy:    [id number] - [place], [place] - [address]  [facility] [place] [address]    Clinical concerns: None     5 minutes for nursing intake (face to face time)     Dominga Cantor [facility]    DISCHARGE PLAN:  1.) Patient to be scheduled for mammogram in 7/2017.  2.) Patient to be scheduled for 6 month F/U with labs prior. Patient to be called with her future appointments by .   Next appointments: See patient instruction section  Departure Mode: Ambulatory  Accompanied by: self  5 minutes for nursing discharge (face to face time)   Ana Laura Hargrove RN              "

## 2017-07-07 DIAGNOSIS — C50.919 BREAST CANCER (H): Primary | ICD-10-CM

## 2017-07-07 RX ORDER — TAMOXIFEN CITRATE 20 MG/1
20 TABLET ORAL DAILY
Qty: 90 TABLET | Refills: 0 | Status: SHIPPED | OUTPATIENT
Start: 2017-07-07 | End: 2017-10-23

## 2017-07-19 ENCOUNTER — HOSPITAL ENCOUNTER (OUTPATIENT)
Dept: MAMMOGRAPHY | Facility: CLINIC | Age: 82
Discharge: HOME | End: 2017-07-19
Attending: INTERNAL MEDICINE | Admitting: INTERNAL MEDICINE
Payer: MEDICARE

## 2017-07-19 DIAGNOSIS — Z12.31 SCREENING MAMMOGRAM, ENCOUNTER FOR: ICD-10-CM

## 2017-07-19 PROCEDURE — 77063 BREAST TOMOSYNTHESIS BI: CPT

## 2017-07-19 PROCEDURE — G0202 SCR MAMMO BI INCL CAD: HCPCS

## 2017-10-23 DIAGNOSIS — C50.919 BREAST CANCER (H): ICD-10-CM

## 2017-10-23 RX ORDER — TAMOXIFEN CITRATE 20 MG/1
20 TABLET ORAL DAILY
Qty: 90 TABLET | Refills: 3 | Status: SHIPPED | OUTPATIENT
Start: 2017-10-23 | End: 2017-11-15

## 2017-11-13 ENCOUNTER — HOSPITAL ENCOUNTER (OUTPATIENT)
Facility: CLINIC | Age: 82
Setting detail: SPECIMEN
Discharge: HOME | End: 2017-11-13
Attending: INTERNAL MEDICINE | Admitting: INTERNAL MEDICINE
Payer: MEDICARE

## 2017-11-13 ENCOUNTER — ONCOLOGY VISIT (OUTPATIENT)
Dept: ONCOLOGY | Facility: CLINIC | Age: 82
End: 2017-11-13
Attending: INTERNAL MEDICINE
Payer: MEDICARE

## 2017-11-13 DIAGNOSIS — R93.89 THICKENED ENDOMETRIUM: ICD-10-CM

## 2017-11-13 DIAGNOSIS — C50.911 BREAST CANCER, RIGHT (H): ICD-10-CM

## 2017-11-13 DIAGNOSIS — M85.80 OSTEOPENIA: ICD-10-CM

## 2017-11-13 DIAGNOSIS — Z12.39 BREAST CANCER SCREENING: ICD-10-CM

## 2017-11-13 DIAGNOSIS — Z85.3 HISTORY OF BREAST CANCER: ICD-10-CM

## 2017-11-13 LAB
ALBUMIN SERPL-MCNC: 3.2 G/DL (ref 3.4–5)
ALP SERPL-CCNC: 66 U/L (ref 40–150)
ALT SERPL W P-5'-P-CCNC: 16 U/L (ref 0–50)
AST SERPL W P-5'-P-CCNC: 12 U/L (ref 0–45)
BILIRUB DIRECT SERPL-MCNC: <0.1 MG/DL (ref 0–0.2)
BILIRUB SERPL-MCNC: 0.3 MG/DL (ref 0.2–1.3)
CALCIUM SERPL-MCNC: 9.3 MG/DL (ref 8.5–10.1)
CANCER AG27-29 SERPL-ACNC: 20 U/ML (ref 0–39)
CREAT SERPL-MCNC: 0.7 MG/DL (ref 0.52–1.04)
GFR SERPL CREATININE-BSD FRML MDRD: 81 ML/MIN/1.7M2
PROT SERPL-MCNC: 7.6 G/DL (ref 6.8–8.8)

## 2017-11-13 PROCEDURE — 82565 ASSAY OF CREATININE: CPT | Performed by: INTERNAL MEDICINE

## 2017-11-13 PROCEDURE — 82310 ASSAY OF CALCIUM: CPT | Performed by: INTERNAL MEDICINE

## 2017-11-13 PROCEDURE — 36415 COLL VENOUS BLD VENIPUNCTURE: CPT

## 2017-11-13 PROCEDURE — 86300 IMMUNOASSAY TUMOR CA 15-3: CPT | Performed by: INTERNAL MEDICINE

## 2017-11-13 PROCEDURE — 80076 HEPATIC FUNCTION PANEL: CPT | Performed by: INTERNAL MEDICINE

## 2017-11-13 NOTE — LETTER
11/13/2017         RE: Aud[place] Hayden  [address] [facility] [place] [facility] [place] [address]        Dear Colleague,    Thank you for referring your patient, Audra Hayden, to the [facility]. Please see a copy of my visit note below.    Medical Assistant Note:  Audra Hayden presents today for lab visit.    Patient seen by provider today: No.   present during visit today: Not Applicable.    Concerns: No Concerns.    Procedure:  Lab draw site: RAC, Needle type: BF, Gauge: 21 g gauze and coban applied.    Post Assessment:  Labs drawn without difficulty: Yes.    Discharge Plan:  Departure Mode: Ambulatory.    Face to Face Time: 4.    Cortney Rohca MA              Again, thank you for allowing me to participate in the care of your patient.        Sincerely,        Oncology Nurse

## 2017-11-13 NOTE — MR AVS SNAPSHOT
After Visit Summary   11/13/2017    Audra Hayden    MRN: [id number]           Patient Information     Date Of Birth          1935        Visit Information        Provider Department      11/13/2017 9:00 AM Nurse, [facility]        Today's Diagnoses     Breast cancer, right (H)        Breast cancer screening        Osteopenia        Thickened endometrium        History of breast cancer           Follow-ups after your visit        Your next 10 appointments already scheduled     Nov 15, 2017  9:00 AM CST   Return Visit with Eloina Gross MD   [facility] ([facility])    [facility]  [address]  [place] [address] [phone]            Nov 17, 2017 10:40 AM CST   (Arrive by 10:25 AM)   BERENICE Spine with Dorie Martinez PT   [facility] [place] [facility] ([facility] [place]  )    [address] [place] [address] [address]  [place] [id number] [phone]            Nov 21, 2017  1:10 PM CST   BERENICE Spine with Dorie Martinez PT   [facility] [place] [facility] ([facility]ICE [place]  )    [address] [place] [address] [address]  [place] [id number] [phone]            Nov 28, 2017  1:10 PM CST   BERENICE Spine with Dorie Martinez PT   [facility] [place] [facility] ([facility] [place]  )    [address] [place] [address] [address]  [place] [address] [phone]              Who to contact     If you have questions or need follow up information about today's clinic visit or your schedule please contact [facility] directly at [phone].  Normal or non-critical lab and imaging results will be communicated to you by MyChart, letter or phone within 4 business days after the clinic has received the results. If you do not hear from us within 7 days, please contact the clinic through MyChart or phone. If you have a critical or abnormal lab result, we will notify you by phone as soon as  "possible.  Submit refill requests through [employer] or call your pharmacy and they will forward the refill request to us. Please allow 3 business days for your refill to be completed.          Additional Information About Your Visit        [employer] Information     [employer] lets you send messages to your doctor, view your test results, renew your prescriptions, schedule appointments and more. To sign up, go to [email or web address] . Click on \"Log in\" on the left side of the screen, which will take you to the Welcome page. Then click on \"Sign up Now\" on the right side of the page.     You will be asked to enter the access code listed below, as well as some personal information. Please follow the directions to create your username and password.     Your access code is: [email or web address]  Expires: 2018  9:03 AM     Your access code will  in 90 days. If you need help or a new code, please call your [place] clinic or [phone].        Care EveryWhere ID     This is your Care EveryWhere ID. This could be used by other organizations to access your [place] medical records  [id number]         Blood Pressure from Last 3 Encounters:   17 106/64   17 120/59   16 128/64    Weight from Last 3 Encounters:   17 60.3 kg (133 lb)   17 60.2 kg (132 lb 12.8 oz)   16 62.4 kg (137 lb 9.6 oz)              We Performed the Following     Ca27.29  breast tumor marker     Calcium     Creatinine     Hepatic panel          Today's Medication Changes          These changes are accurate as of: 17  9:03 AM.  If you have any questions, ask your nurse or doctor.               These medicines have changed or have updated prescriptions.        Dose/Directions    KLOR-CON 20 MEQ CR tablet   This may have changed:  See the new instructions.   Used for:  Hypertension goal BP (blood pressure) < 130/80   Generic drug:  potassium chloride SA        TAKE 1 TABLET BY MOUTH 2 TIMES DAILY.   Quantity:  60 " tablet   Refills:  2                Primary Care Provider Office Phone # Fax #    [facility] [phone]       [facility] [facility], [address]  [place] [address]        Equal Access to Services     CONCHITA CARNEY : Hadsanaz beto alvares felipa Soliliam, waaxda luqadaha, qaybta kaalmada adelarry, anuradha simmons kunal dior. So [facility] [phone].    ATENCIÓN: Si habla español, tiene a ferrer disposición servicios gratuitos de asistencia lingüística. Llame al [phone].    We comply with applicable federal civil rights laws and [place] laws. We do not discriminate on the basis of race, color, national origin, age, disability, sex, sexual orientation, or gender identity.            Thank you!     Thank you for choosing [facility]  for your care. Our goal is always to provide you with excellent care. Hearing back from our patients is one way we can continue to improve our services. Please take a few minutes to complete the written survey that you may receive in the mail after your visit with us. Thank you!             Your Updated Medication List - Protect others around you: Learn how to safely use, store and throw away your medicines at www.disposemymeds.org.          This list is accurate as of: 11/13/17  9:03 AM.  Always use your most recent med list.                   Brand Name Dispense Instructions for use Diagnosis    ALEVE PO      Take 220 mg by mouth as needed for moderate pain For headaches        CALCIUM + D PO      Take 2 capsules by mouth daily        denosumab 60 MG/ML Soln injection    PROLIA     Inject 60 mg Subcutaneous every 6 months Reported on 5/17/2017        KLOR-CON 20 MEQ CR tablet   Generic drug:  potassium chloride SA     60 tablet    TAKE 1 TABLET BY MOUTH 2 TIMES DAILY.    Hypertension goal BP (blood pressure) < 130/80       MAGNESIUM OXIDE PO      Take 400 mg by mouth as needed for leg cramps        MECLIZINE HCL PO      Take 25 mg  by mouth as needed for dizziness For dizziness        tamoxifen 20 MG tablet    NOLVADEX    90 tablet    Take 1 tablet (20 mg) by mouth daily    Breast cancer (H)       triamterene-hydrochlorothiazide 37.5-25 MG per capsule    DYAZIDE    90 capsule    TAKE 1 CAPSULE BY MOUTH DAILY.    Hypertension       verapamil 240 MG Cp24 24 hr capsule    VERELAN    90 capsule    TAKE 1 CAPSULE (240 MG) BY ORAL ROUTE ONCE DAILY    Hypertension       vitamin D 1000 UNITS capsule      Take 1,000 Units by mouth daily 1-2 daily

## 2017-11-13 NOTE — PROGRESS NOTES
Medical Assistant Note:  Audra Hayden presents today for lab visit.    Patient seen by provider today: No.   present during visit today: Not Applicable.    Concerns: No Concerns.    Procedure:  Lab draw site: RAC, Needle type: BF, Gauge: 21 g gauze and coban applied.    Post Assessment:  Labs drawn without difficulty: Yes.    Discharge Plan:  Departure Mode: Ambulatory.    Face to Face Time: 4.    Cortney Rocha MA

## 2017-11-15 ENCOUNTER — ONCOLOGY VISIT (OUTPATIENT)
Dept: ONCOLOGY | Facility: CLINIC | Age: 82
End: 2017-11-15
Attending: INTERNAL MEDICINE
Payer: COMMERCIAL

## 2017-11-15 VITALS
HEART RATE: 69 BPM | DIASTOLIC BLOOD PRESSURE: 72 MMHG | OXYGEN SATURATION: 98 % | BODY MASS INDEX: 21.77 KG/M2 | TEMPERATURE: 97.6 F | WEIGHT: 126.8 LBS | SYSTOLIC BLOOD PRESSURE: 129 MMHG

## 2017-11-15 DIAGNOSIS — R93.89 THICKENED ENDOMETRIUM: ICD-10-CM

## 2017-11-15 DIAGNOSIS — M85.80 OSTEOPENIA, UNSPECIFIED LOCATION: ICD-10-CM

## 2017-11-15 DIAGNOSIS — Z12.31 SCREENING MAMMOGRAM, ENCOUNTER FOR: ICD-10-CM

## 2017-11-15 DIAGNOSIS — Z85.3 PERSONAL HISTORY OF MALIGNANT NEOPLASM OF BREAST: Primary | ICD-10-CM

## 2017-11-15 PROCEDURE — 99214 OFFICE O/P EST MOD 30 MIN: CPT | Performed by: INTERNAL MEDICINE

## 2017-11-15 PROCEDURE — 99211 OFF/OP EST MAY X REQ PHY/QHP: CPT

## 2017-11-15 ASSESSMENT — PAIN SCALES - GENERAL: PAINLEVEL: NO PAIN (0)

## 2017-11-15 NOTE — MR AVS SNAPSHOT
After Visit Summary   11/15/2017    Audra Hayden    MRN: [id number]           Patient Information     Date Of Birth          1935        Visit Information        Provider Department      11/15/2017 9:00 AM Eloina Gross MD [facility]        Today's Diagnoses     Personal history of malignant neoplasm of breast    -  1    Osteopenia, unspecified location        Thickened endometrium        Screening mammogram, encounter for          Care Instructions    12 months f/u with labs. MA is due July.          Follow-ups after your visit        Your next 10 appointments already scheduled     Nov 17, 2017 10:40 AM CST   (Arrive by 10:25 AM)   BERENICE Spine with Dorie Martinez PT   [facility] [facility] ([facility] [place]  )    [address] [place] [address] [address]  [place] [address]   [phone]            Nov 21, 2017  1:10 PM CST   BERENICE Spine with Dorie Martinez PT   [facility] [place] [facility] ([facility] [place]  )    [address] [place] [address] [address]  [place] [address]   [phone]            Nov 28, 2017  1:10 PM CST   BERENICE Spine with Dorie Martinez PT   [facility] ([facility] [place]  )    [address] [place] [address] [address]  [place] [address]   [phone]            Jul 20, 2018  9:15 AM CDT   MA SCREENING DIGITAL BILATERAL with [id number]   [facility] ([facility])    [address] [place] [address], [address]  [facility] [address]   [phone]           Do not use any powder, lotion or deodorant under your arms or on your breast. If you do, we will ask you to remove it before your exam.  Wear comfortable, two-piece clothing.  If you have any allergies, tell your care team.  Bring any previous mammograms from other facilities or have them mailed to the breast center. Three-dimensional (3D) mammograms are available at [place] locations in [facility], [facility]  "[facility], [place], [place], and [place]. [facility] locations include [place] [facility] in [place]. Benefits of 3D mammograms include: - Improved rate of cancer detection - Decreases your chance of having to go back for more tests, which means fewer: - \"False-positive\" results (This means that there is an abnormal area but it isn't cancer.) - Invasive testing procedures, such as a biopsy or surgery - Can provide clearer images of the breast if you have dense breast tissue. 3D mammography is an optional exam that anyone can have with a 2D mammogram. It doesn't replace or take the place of a 2D mammogram. 2D mammograms remain an effective screening test for all women.  Not all insurance companies cover the cost of a 3D mammogram. Check with your insurance.            Nov 09, 2018  9:00 AM CST   Return Visit with  Oncology Nurse   [facility] ([employer] [facility])    [facility]  [address]  [facility] [address] [phone]            Nov 14, 2018  9:00 AM CST   Return Visit with Eloina Gross MD   [facility])    [facility]  [address]  [place] [address] [phone]              Future tests that were ordered for you today     Open Future Orders        Priority Expected Expires Ordered    Ca27.29  breast tumor marker Routine 10/1/2018 11/15/2018 11/15/2017    Comprehensive metabolic panel Routine 10/1/2018 11/15/2018 11/15/2017    MA Screening Digital Bilateral Routine 7/1/2018 11/15/2018 11/15/2017            Who to contact     If you have questions or need follow up information about today's clinic visit or your schedule please contact [facility] directly at [phone].  Normal or non-critical lab and imaging results will be communicated to you by MyChart, letter or phone within 4 business days after the clinic has received the results. If you " "do not hear from us within 7 days, please contact the clinic through [employer] or phone. If you have a critical or abnormal lab result, we will notify you by phone as soon as possible.  Submit refill requests through [employer] or call your pharmacy and they will forward the refill request to us. Please allow 3 business days for your refill to be completed.          Additional Information About Your Visit        [employer] Information     [employer] lets you send messages to your doctor, view your test results, renew your prescriptions, schedule appointments and more. To sign up, go to [email or web address] . Click on \"Log in\" on the left side of the screen, which will take you to the Welcome page. Then click on \"Sign up Now\" on the right side of the page.     You will be asked to enter the access code listed below, as well as some personal information. Please follow the directions to create your username and password.     Your access code is: [email or web address]  Expires: 2018  9:03 AM     Your access code will  in 90 days. If you need help or a new code, please call your [place] clinic or [phone].        Care EveryWhere ID     This is your Care EveryWhere ID. This could be used by other organizations to access your [place] medical records  [id number]        Your Vitals Were     Pulse Temperature Pulse Oximetry BMI (Body Mass Index)          69 97.6  F (36.4  C) (Oral) 98% 21.77 kg/m2         Blood Pressure from Last 3 Encounters:   11/15/17 129/72   17 106/64   17 120/59    Weight from Last 3 Encounters:   11/15/17 57.5 kg (126 lb 12.8 oz)   17 60.3 kg (133 lb)   17 60.2 kg (132 lb 12.8 oz)                 Today's Medication Changes          These changes are accurate as of: 11/15/17  9:44 AM.  If you have any questions, ask your nurse or doctor.               These medicines have changed or have updated prescriptions.        Dose/Directions    KLOR-CON 20 MEQ CR tablet   This may have " changed:  See the new instructions.   Used for:  Hypertension goal BP (blood pressure) < 130/80   Generic drug:  potassium chloride SA        TAKE 1 TABLET BY MOUTH 2 TIMES DAILY.   Quantity:  60 tablet   Refills:  2                Primary Care Provider Office Phone # Fax #    [place] [facility] [phone]       [facility] [facility], [address]  [facility] [address]        Equal Access to Services     ASHLEY CARNEY : Hadii aad ku hadasho Soomaali, waaxda luqadaha, qaybta kaalmada adeegyada, waxay idiin hayloven adenoe simmons lasherifyuliana ah. So [facility] [phone].    ATENCIÓN: Si fidelia bennett, tiene a ferrer disposición servicios gratuitos de asistencia lingüística. Llame al [phone].    We comply with applicable federal civil rights laws and [place] laws. We do not discriminate on the basis of race, color, national origin, age, disability, sex, sexual orientation, or gender identity.            Thank you!     Thank you for choosing [facility]  for your care. Our goal is always to provide you with excellent care. Hearing back from our patients is one way we can continue to improve our services. Please take a few minutes to complete the written survey that you may receive in the mail after your visit with us. Thank you!             Your Updated Medication List - Protect others around you: Learn how to safely use, store and throw away your medicines at www.disposemymeds.org.          This list is accurate as of: 11/15/17  9:44 AM.  Always use your most recent med list.                   Brand Name Dispense Instructions for use Diagnosis    ALEVE PO      Take 220 mg by mouth as needed for moderate pain For headaches        CALCIUM + D PO      Take 2 capsules by mouth daily        denosumab 60 MG/ML Soln injection    PROLIA     Inject 60 mg Subcutaneous every 6 months Reported on 5/17/2017        KLOR-CON 20 MEQ CR tablet   Generic drug:  potassium chloride SA     60 tablet    TAKE 1  TABLET BY MOUTH 2 TIMES DAILY.    Hypertension goal BP (blood pressure) < 130/80       MAGNESIUM OXIDE PO      Take 400 mg by mouth as needed for leg cramps        MECLIZINE HCL PO      Take 25 mg by mouth as needed for dizziness For dizziness        triamterene-hydrochlorothiazide 37.5-25 MG per capsule    DYAZIDE    90 capsule    TAKE 1 CAPSULE BY MOUTH DAILY.    Hypertension       verapamil 240 MG Cp24 24 hr capsule    VERELAN    90 capsule    TAKE 1 CAPSULE (240 MG) BY ORAL ROUTE ONCE DAILY    Hypertension       vitamin D 1000 UNITS capsule      Take 1,000 Units by mouth daily 1-2 daily

## 2017-11-15 NOTE — PROGRESS NOTES
"Oncology Rooming Note    November 15, 2017 8:57 AM   Audra Hayden is a 82 year old female who presents for:    Chief Complaint   Patient presents with     Oncology Clinic Visit     Initial Vitals: /72 (BP Location: Right arm, Patient Position: Chair, Cuff Size: Adult Regular)  Pulse 69  Temp 97.6  F (36.4  C) (Oral)  Wt 57.5 kg (126 lb 12.8 oz)  SpO2 98%  BMI 21.77 kg/m2 Estimated body mass index is 21.77 kg/(m^2) as calculated from the following:    Height as of 2/17/17: 1.626 m (5' 4\").    Weight as of this encounter: 57.5 kg (126 lb 12.8 oz). Body surface area is 1.61 meters squared.  No Pain (0) Comment: Data Unavailable   No LMP recorded. Patient is postmenopausal.  Allergies reviewed: Yes  Medications reviewed: Yes    Medications: Medication refills not needed today.  Pharmacy name entered into [employer]:    [id number] - [place], [place] - [address]  [facility] [place]  [place], [place] - [address]    Clinical concerns: None     5 minutes for nursing intake (face to face time)     Dominga Cantor CMA      DISCHARGE PLAN:  1.) Mammogram to be scheduled for 7/2018  2.) Patient to be scheduled for labs and follow up in 12 months with Dr. Gross  Next appointments: See patient instruction section  Departure Mode: Ambulatory  Accompanied by: self  7 minutes for nursing discharge (face to face time)   [place] Laura Hargrove RN            "

## 2017-11-15 NOTE — LETTER
"    11/15/2017         RE: Audra Hayden  [address]  [facility] [address]        Dear Colleague,    Thank you for referring your patient, Audra Hayden, to the [facility]. Please see a copy of my visit note below.    Oncology Rooming Note    November 15, 2017 8:57 AM   Audra Hayden is a 82 year old female who presents for:    Chief Complaint   Patient presents with     Oncology Clinic Visit     Initial Vitals: /72 (BP Location: Right arm, Patient Position: Chair, Cuff Size: Adult Regular)  Pulse 69  Temp 97.6  F (36.4  C) (Oral)  Wt 57.5 kg (126 lb 12.8 oz)  SpO2 98%  BMI 21.77 kg/m2 Estimated body mass index is 21.77 kg/(m^2) as calculated from the following:    Height as of 2/17/17: 1.626 m (5' 4\").    Weight as of this encounter: 57.5 kg (126 lb 12.8 oz). Body surface area is 1.61 meters squared.  No Pain (0) Comment: Data Unavailable   No LMP recorded. Patient is postmenopausal.  Allergies reviewed: Yes  Medications reviewed: Yes    Medications: Medication refills not needed today.  Pharmacy name entered into [facility]:    [id number] - [place], [place] - [address]  [facility] [place] [address]    Clinical concerns: None     5 minutes for nursing intake (face to face time)     Dominga Cantor CMA      DISCHARGE PLAN:  1.) Mammogram to be scheduled for 7/2018  2.) Patient to be scheduled for labs and follow up in 12 months with Dr. Gross  Next appointments: See patient instruction section  Departure Mode: Ambulatory  Accompanied by: self  7 minutes for nursing discharge (face to face time)   Ana Laura Hargrove RN              Hematology/Oncology Follow-up Visit:     Primary Care Physician- Dr.Heidi Pablo    REASON FOR VISIT: Right-sided breast cancer 2012 T1cN0 s/p lumpectomy, RT, on Tamoxifen.    HISTORY OF PRESENT ILLNESS: She was diagnosed in 6/2012 at age 77 via MA right breast cancer which revealed a mass of 1.1 cm. Biopsy done on 06/28/2012 " revealed an ER/NE positive and grade 2 invasive ductal carcinoma. She then had on 2012 a right-sided lumpectomy and sentinel lymph node biopsy and pathology revealed an invasive ductal carcinoma, 1.3 cm, grade 2, margins negative, negative sentinel lymph nodes. Staging was pT1c pN0. HER2/maya by FISH was negative. Oncotype DX was 17, low risk. She had Radiation done on 10/1/12. She started Femara on 10/08/2012 and switched to Arimidex on 13 due to bony pain, then changed to tamoxifen end of 2014.    She finished total 5 yrs of anti hormone therapy in 2017 at age 82 due to age, endometrium polyps/ thickening and poor tolerance of AI.    PAST MEDICAL HISTORY:  Hypertension, high cholesterol, osteoporosis, SCC and basal Cell Ca of skin.    SURGERIES: Two back disk surgeries in the back, blepharoplasty, appendectomy, history of sinus surgery, history of Mohs procedure for basal cell carcinoma, left foot pin placement.     ALLERGIES: She had tremors and involuntary movements with Zofran. She had hives and throat swelling with contrast. Sulfa drugs cause a rash.   MEDICATIONS: reviewed    SOCIAL HISTORY: She is , lives in [place]. Is a never smoker and does not drink alcohol. 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     FAMILY HISTORY: A sister had a mastectomy after being diagnosed with breast cancer at the age of 50. She has 2 brothers who  of cancer, one with a brain tumor and the other with a metastatic cancer probably of head and neck etiology.     REVIEW OF SYSTEMS: Energy levels is fair.   She had another Gyn exam found polyps and had D/C in 2017 with negative biopsy.    She is very energetic. She has no other complains.     PHYSICAL EXAMINATION:   VITAL SIGNS: Blood pressure 129/72, pulse 69, temperature 97.6  F (36.4  C), temperature source Oral, weight 57.5 kg (126 lb 12.8  oz), SpO2 98 %.  HEENT: Head is normocephalic, atraumatic. Eyes: PERRLA, EOMI, no icterus or pallor noted. Oropharyngeal examination was clear.   NECK: Supple, no masses felt in the bilateral cervical or supraclavicular area.   LUNGS: Clear, no wheezing or ronchi  CARDIOVASCULAR: Rate and rhythm was regular, no murmur or gallop  ABDOMEN: Soft, nontender, nondistended, no organomegaly.   BREASTS:. Bilateral axillae negative for lymphadenopathy. [place] lymph node biopsy site healing well. Right breast changes from a lumpectomy. Mild tenderness, no bruising, scar well-healed. No lumps or bumps felt in either breast:   ABDOMEN: Soft, nontender, nondistended, no organomegaly. Bowel sounds heard and normal.   EXTREMITIES: Warm and no edema, no sign of cellulitis  NEUROLOGIC: sensation intact, muscle strength and tone symmetrical all through    Current LAB Data Reviewed  LFT/marker: good    2/2017 Endometrium, curettage   - Benign endocervical mucosa, endometrial tissue not identified     CURRENT IMAGE REVIEWED  7/2017 MA: NEGATIVE    Old data review and summary  11/2016 dexa: slight improved osteopenia.   Bone Scan 8/28/13  1. Degenerative change in the lower lumbar spine and right wrist.   2. Small focus of increased activity left. Alignment posterior frontal calvarium with possible plain film correlation showing a 1 cm sclerotic focus. This is of uncertain significance, but likelihood of osseous metastasis is considered very low - MM work up were negative.   Mammogram 7/29/13 - The breast parenchyma is heterogeneously dense. Post lumpectomy scarring in the upper outer right breast. There is mild skin thickening in the right breast which has slightly decreased. The   left breast is unchanged. No mammographic finding of suspicion.     Dexa noted (outside report-2/3/14)- Osteoporosis (had osteopenia on 2013 Dexa).     A/p  1. [id number] right breast Ca 2012 s/p lumpectomy, RT, RS 17, s/p femera, then arimidex, then  tamoxifen due to bone pain.  She finished total 5 yrs of anti hormone therapy in 11/2017 at age 82 due to age, endometrium polyps/ thickening and poor tolerance of AI.  She is due 12 months f/u with labs. MA is due July.    2. Osteoporosis, h/o falls and dizziness and balance issues.Started Prolia 60 mg sc q 6 months. Total 6 doses till fall 2016. We talked about the proper vit D intake.   She is advised on proper dose of vit D 2000 IU per day.      3. Endometrium polyps/ thickening by gyn exam with negative biopsy and she also had D/C with Dr. Aziza Arrington in 4/2015 and again in 2/2017.   She is advised on routine annual f/u with Dr. Arrington.         Again, thank you for allowing me to participate in the care of your patient.        Sincerely,        Eloina Gross MD, MD

## 2017-11-15 NOTE — PROGRESS NOTES
Hematology/Oncology Follow-up Visit:     Primary Care Physician- Dr.Heidi Pablo    REASON FOR VISIT: Right-sided breast cancer  T1cN0 s/p lumpectomy, RT, on Tamoxifen.    HISTORY OF PRESENT ILLNESS: She was diagnosed in 2012 at age 77 via MA right breast cancer which revealed a mass of 1.1 cm. Biopsy done on 2012 revealed an ER/NE positive and grade 2 invasive ductal carcinoma. She then had on 2012 a right-sided lumpectomy and sentinel lymph node biopsy and pathology revealed an invasive ductal carcinoma, 1.3 cm, grade 2, margins negative, negative sentinel lymph nodes. Staging was pT1c pN0. HER2/maya by FISH was negative. Oncotype DX was 17, low risk. She had Radiation done on 10/1/12. She started Femara on 10/08/2012 and switched to Arimidex on 13 due to bony pain, then changed to tamoxifen end of 2014.    She finished total 5 yrs of anti hormone therapy in 2017 at age 82 due to age, endometrium polyps/ thickening and poor tolerance of AI.    PAST MEDICAL HISTORY:  Hypertension, high cholesterol, osteoporosis, SCC and basal Cell Ca of skin.    SURGERIES: Two back disk surgeries in the back, blepharoplasty, appendectomy, history of sinus surgery, history of Mohs procedure for basal cell carcinoma, left foot pin placement.     ALLERGIES: She had tremors and involuntary movements with Zofran. She had hives and throat swelling with contrast. Sulfa drugs cause a rash.   MEDICATIONS: reviewed    SOCIAL HISTORY: She is , lives in [place]. Is a never smoker and does not drink alcohol. 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     FAMILY HISTORY: A sister had a mastectomy after being diagnosed with breast cancer at the age of 50. She has 2 brothers who  of cancer, one with a brain tumor and the other with a metastatic cancer probably of head and neck etiology.     REVIEW OF  SYSTEMS: Energy levels is fair.   She had another Gyn exam found polyps and had D/C in 2/2017 with negative biopsy.    She is very energetic. She has no other complains.     PHYSICAL EXAMINATION:   VITAL SIGNS: Blood pressure 129/72, pulse 69, temperature 97.6  F (36.4  C), temperature source Oral, weight 57.5 kg (126 lb 12.8 oz), SpO2 98 %.  HEENT: Head is normocephalic, atraumatic. Eyes: PERRLA, EOMI, no icterus or pallor noted. Oropharyngeal examination was clear.   NECK: Supple, no masses felt in the bilateral cervical or supraclavicular area.   LUNGS: Clear, no wheezing or ronchi  CARDIOVASCULAR: Rate and rhythm was regular, no murmur or gallop  ABDOMEN: Soft, nontender, nondistended, no organomegaly.   BREASTS:. Bilateral axillae negative for lymphadenopathy. [place] lymph node biopsy site healing well. Right breast changes from a lumpectomy. Mild tenderness, no bruising, scar well-healed. No lumps or bumps felt in either breast:   ABDOMEN: Soft, nontender, nondistended, no organomegaly. Bowel sounds heard and normal.   EXTREMITIES: Warm and no edema, no sign of cellulitis  NEUROLOGIC: sensation intact, muscle strength and tone symmetrical all through    Current LAB Data Reviewed  LFT/marker: good    2/2017 Endometrium, curettage   - Benign endocervical mucosa, endometrial tissue not identified     CURRENT IMAGE REVIEWED  7/2017 MA: NEGATIVE    Old data review and summary  11/2016 dexa: slight improved osteopenia.   Bone Scan 8/28/13  1. Degenerative change in the lower lumbar spine and right wrist.   2. Small focus of increased activity left. Alignment posterior frontal calvarium with possible plain film correlation showing a 1 cm sclerotic focus. This is of uncertain significance, but likelihood of osseous metastasis is considered very low - MM work up were negative.   Mammogram 7/29/13 - The breast parenchyma is heterogeneously dense. Post lumpectomy scarring in the upper outer right breast. There is mild  skin thickening in the right breast which has slightly decreased. The   left breast is unchanged. No mammographic finding of suspicion.     Dexa noted (outside report-2/3/14)- Osteoporosis (had osteopenia on 2013 Dexa).     A/p  1. [id number] right breast Ca 2012 s/p lumpectomy, RT, RS 17, s/p femera, then arimidex, then tamoxifen due to bone pain.  She finished total 5 yrs of anti hormone therapy in 11/2017 at age 82 due to age, endometrium polyps/ thickening and poor tolerance of AI.  She is due 12 months f/u with labs. MA is due July.    2. Osteoporosis, h/o falls and dizziness and balance issues.Started Prolia 60 mg sc q 6 months. Total 6 doses till fall 2016. We talked about the proper vit D intake.   She is advised on proper dose of vit D 2000 IU per day.      3. Endometrium polyps/ thickening by gyn exam with negative biopsy and she also had D/C with Dr. Aziza Arrington in 4/2015 and again in 2/2017.   She is advised on routine annual f/u with Dr. Arrington.

## 2017-11-17 ENCOUNTER — THERAPY VISIT (OUTPATIENT)
Dept: PHYSICAL THERAPY | Facility: CLINIC | Age: 82
End: 2017-11-17
Payer: COMMERCIAL

## 2017-11-17 DIAGNOSIS — M75.41 IMPINGEMENT SYNDROME OF SHOULDER REGION, RIGHT: Primary | ICD-10-CM

## 2017-11-17 DIAGNOSIS — M54.2 NECK PAIN: ICD-10-CM

## 2017-11-17 PROCEDURE — G8984 CARRY CURRENT STATUS: HCPCS | Mod: GP | Performed by: PHYSICAL THERAPIST

## 2017-11-17 PROCEDURE — G8985 CARRY GOAL STATUS: HCPCS | Mod: GP | Performed by: PHYSICAL THERAPIST

## 2017-11-17 PROCEDURE — 97161 PT EVAL LOW COMPLEX 20 MIN: CPT | Mod: GP | Performed by: PHYSICAL THERAPIST

## 2017-11-17 PROCEDURE — 97110 THERAPEUTIC EXERCISES: CPT | Mod: GP | Performed by: PHYSICAL THERAPIST

## 2017-11-17 NOTE — PROGRESS NOTES
Subjective:    HPI Comments: C/C:  Relatively constant base of neck, bilateral upper trap ache (7/10) pain.  Worse with increased activity-house work.  Better with rest, Kristal.  Has noted two episodes of sharp, radiating pain into left UE-uncertain of cause.  One episode of pinch/cramping pain on the right.  Denies numbness, tingling, change in sensation.  Condition is not improving.    Hx:  6/17-Playing corn hole bean bag toss game and noted an onset of right scapular pain within an hour of playing.  Received some relief from a massage from her daughter.  Since then cont to note right shoulder/deltoid pain that over time evolved into current sx.  Has been treated by a chiropractor and another PT without any change in sx.  X-rays to R shoulder are (-) for arthritis.  PMH:  Has had 2 successful low back surgeries.  Breast CA 5 years ago.  No previous injury to neck or shoulder.  General health:  Good.  Pt is retired.  Wants to return to being able to do her house work.       The history is provided by the patient.     The history is provided by the patient.                       Objective:    Standing Alignment:      Shoulder/UE:  Rounded shoulders                                  Cervical/Thoracic Evaluation    AROM:  AROM Cervical:    Flexion:            70%  Extension:       50%  Rotation:         Left: 75%     Right: 70%  Side Bend:      Left: 90%     Right:  90%    Strength: Neck motion limited but not painful.                           Shoulder Evaluation:  ROM:    PROM:    Flexion:  Left:  10 degree loss and left upper trap pain    Right: 20 degree loss and right UT pain      Abduction:  Left:  90    Right:  90      External Rotation:  Left:  85    Right:  50 (+)                    Strength:        Abduction:  Left: 5/5   Pain:-    Right: 5/5      Pain:-  Adduction:  Left: 5/5     Pain:-    Right: 5/5      Pain:-  Internal Rotation:  Left:5/5      Pain:-    Right: 5/5      Pain:-  External Rotation:    Left:5/5      Pain:-   Right:5/5      Pain:-      Horizontal Adduction:  Right:/5     Pain:-          Palpation:  Palpation assessed shoulder: Hypo block of ribs on the right.      Mobility Tests:          Acromioclavicular left:  Normal  Acromioclavicular right:  Normal    Scapulothoracic left:  Hypomobile  Scapulothoracic right:  Hypomobile  Sternoclavicular left:  Normal  Sternoclavicular right:  Normal                                                                          Musculoskeletal:        Arms:      ROS    Assessment/Plan:      Patient is a 82 year old female with cervical and right side shoulder complaints.    Patient has the following significant findings with corresponding treatment plan.                Diagnosis 1:  Right shoulder impingement/neck pain  Pain -  manual therapy, self management, education and home program  Decreased ROM/flexibility - manual therapy and therapeutic exercise  Decreased joint mobility - manual therapy and therapeutic exercise  Decreased strength - therapeutic exercise and therapeutic activities  Impaired muscle performance - neuro re-education  Decreased function - therapeutic activities  Impaired posture - neuro re-education    Therapy Evaluation Codes:   1) History comprised of:   Personal factors that impact the plan of care:      None.    Comorbidity factors that impact the plan of care are:      High blood pressure.     Medications impacting care: None.  2) Examination of Body Systems comprised of:   Body structures and functions that impact the plan of care:      Cervical spine and Shoulder.   Activity limitations that impact the plan of care are:      Cooking and Dressing.  3) Clinical presentation characteristics are:   Stable/Uncomplicated.  4) Decision-Making    Low complexity using standardized patient assessment instrument and/or measureable assessment of functional outcome.  Cumulative Therapy Evaluation is: Low complexity.    Previous and current functional  limitations:  (See Goal Flow Sheet for this information)    Short term and Long term goals: (See Goal Flow Sheet for this information)     Communication ability:  Patient appears to be able to clearly communicate and understand verbal and written communication and follow directions correctly.  Treatment Explanation - The following has been discussed with the patient:   RX ordered/plan of care  Anticipated outcomes  Possible risks and side effects  This patient would benefit from PT intervention to resume normal activities.   Rehab potential is good.    Frequency:  1 X week, once daily  Duration:  for 8 weeks  Discharge Plan:  Achieve all LTG.  Independent in home treatment program.  Reach maximal therapeutic benefit.    Please refer to the daily flowsheet for treatment today, total treatment time and time spent performing 1:1 timed codes.

## 2017-11-17 NOTE — LETTER
[facility]  [address]  [facility] [address]  [phone]    2017    Re: Audra Hayden   :   1935  MRN:  [id number]   REFERRING PHYSICIAN:   Laurie Smith    [facility]  Date of Initial Evaluation:  2017  Visits:  1 Rxs Used: 1  Reason for Referral:     Impingement syndrome of shoulder region, right  Neck pain  EVALUATION SUMMARY  Subjective:  HPI Comments: C/C:  Relatively constant base of neck, bilateral upper trap ache (7/10) pain.  Worse with increased activity-house work.  Better with rest, Kristal.  Has noted two episodes of sharp, radiating pain into left UE-uncertain of cause.  One episode of pinch/cramping pain on the right.  Denies numbness, tingling, change in sensation.  Condition is not improving.  Hx:  -Playing corn hole bean bag toss game and noted an onset of right scapular pain within an hour of playing.  Received some relief from a massage from her daughter.  Since then cont to note right shoulder/deltoid pain that over time evolved into current sx.  Has been treated by a chiropractor and another PT without any change in sx.  X-rays to R shoulder are (-) for arthritis.PMH:  Has had 2 successful low back surgeries.  Breast CA 5 years ago.  No previous injury to neck or shoulder.General health:  Good.  Pt is retired.  Wants to return to being able to do her house work.  The history is provided by the patient.    Pertinent medical history includes:  High blood pressure and cancer.  Medical allergies: no.  Other surgeries include:  Cancer surgery.  Current medications:  High blood pressure medication.  Current occupation is RETIRED .    Barriers include:  None as reported by patient.Red flags:  None as reported by patient.  Objective:  Standing Alignment:    Shoulder/UE:  Rounded shoulders  Cervical/Thoracic Evaluation  AROM:  AROM Cervical:  Flexion:             70%  Extension:       50%  Rotation:         Left: 75%     Right: 70%  Side Bend:      Left: 90%     Right:  90%  Strength: Neck motion limited but not painful.  Shoulder Evaluation:  ROM:  PROM:    Flexion:  Left:  10 degree loss and left upper trap pain    Right: 20 degree loss and right UT pain    Abduction:  Left:  90    Right:  90  External Rotation:  Left:  85    Right:  50 (+)    Re: Audra Hayden   :   1935    Strength:    Abduction:  Left: 5/5   Pain:-    Right: 5/5      Pain:-  Adduction:  Left: 5/5     Pain:-    Right: 5/5      Pain:-  Internal Rotation:  Left:5/5      Pain:-    Right: 55      Pain:-  External Rotation:   Left:5/5      Pain:-   Right:55      Pain:-    Horizontal Adduction:  Right:/5     Pain:-  Palpation:  Palpation assessed shoulder: Hypo block of ribs on the right.  Mobility Tests:    Acromioclavicular left:  Normal  Acromioclavicular right:  Normal    Scapulothoracic left:  Hypomobile  Scapulothoracic right:  Hypomobile  Sternoclavicular left:  Normal  Sternoclavicular right:  Normal    Musculoskeletal:        Arms:  Assessment/Plan:    Patient is a 82 year old female with cervical and right side shoulder complaints.    Patient has the following significant findings with corresponding treatment plan.                Diagnosis 1:  Right shoulder impingement/neck pain  Pain -  manual therapy, self management, education and home program  Decreased ROM/flexibility - manual therapy and therapeutic exercise  Decreased joint mobility - manual therapy and therapeutic exercise  Decreased strength - therapeutic exercise and therapeutic activities  Impaired muscle performance - neuro re-education  Decreased function - therapeutic activities  Impaired posture - neuro re-education  Therapy Evaluation Codes:   1) History comprised of:   Personal factors that impact the plan of care:      None.    Comorbidity factors that impact the plan of care are:      High blood pressure.     Medications  impacting care: None.  2) Examination of Body Systems comprised of:   Body structures and functions that impact the plan of care:      Cervical spine and Shoulder.   Activity limitations that impact the plan of care are:      Cooking and Dressing.    Re: Audra Hayden   :   1935  3) Clinical presentation characteristics are:   Stable/Uncomplicated.  4) Decision-Making    Low complexity using standardized patient assessment instrument and/or measureable assessment of functional outcome.  Cumulative Therapy Evaluation is: Low complexity.  Previous and current functional limitations:  (See Goal Flow Sheet for this information)    Short term and Long term goals: (See Goal Flow Sheet for this information)   Communication ability:  Patient appears to be able to clearly communicate and understand verbal and written communication and follow directions correctly.  Treatment Explanation - The following has been discussed with the patient:   RX ordered/plan of care  Anticipated outcomes  Possible risks and side effects  This patient would benefit from PT intervention to resume normal activities.   Rehab potential is good.  Frequency:  1 X week, once daily  Duration:  for 8 weeks  Discharge Plan:  Achieve all LTG.  Independent in home treatment program.  Reach maximal therapeutic benefit.    Thank you for your referral.    INQUIRIES  Therapist: Dorie Martinez PT, ScD, [facility]   [facility]  [address] [place] [address] [address] [place] [address]  Phone: [phone]  Fax: [phone]

## 2017-11-20 NOTE — PROGRESS NOTES
Subjective:    Patient is a 82 year old female presenting with rehab left ankle/foot hpi.                                      Pertinent medical history includes:  High blood pressure and cancer.  Medical allergies: no.  Other surgeries include:  Cancer surgery.  Current medications:  High blood pressure medication.  Current occupation is RETIRED .        Barriers include:  None as reported by patient.    Red flags:  None as reported by patient.                        Objective:    System    Physical Exam    General     ROS    Assessment/Plan:

## 2017-11-21 ENCOUNTER — THERAPY VISIT (OUTPATIENT)
Dept: PHYSICAL THERAPY | Facility: CLINIC | Age: 82
End: 2017-11-21
Payer: COMMERCIAL

## 2017-11-21 DIAGNOSIS — M75.41 IMPINGEMENT SYNDROME OF SHOULDER REGION, RIGHT: ICD-10-CM

## 2017-11-21 DIAGNOSIS — M54.2 NECK PAIN: ICD-10-CM

## 2017-11-21 PROCEDURE — 97112 NEUROMUSCULAR REEDUCATION: CPT | Mod: GP | Performed by: PHYSICAL THERAPIST

## 2017-11-21 PROCEDURE — 97110 THERAPEUTIC EXERCISES: CPT | Mod: GP | Performed by: PHYSICAL THERAPIST

## 2017-11-21 PROCEDURE — 97140 MANUAL THERAPY 1/> REGIONS: CPT | Mod: GP | Performed by: PHYSICAL THERAPIST

## 2018-07-20 ENCOUNTER — HOSPITAL ENCOUNTER (OUTPATIENT)
Dept: MAMMOGRAPHY | Facility: CLINIC | Age: 83
Discharge: HOME | End: 2018-07-20
Attending: INTERNAL MEDICINE | Admitting: INTERNAL MEDICINE
Payer: MEDICARE

## 2018-07-20 DIAGNOSIS — Z12.31 SCREENING MAMMOGRAM, ENCOUNTER FOR: ICD-10-CM

## 2018-07-20 PROCEDURE — 77063 BREAST TOMOSYNTHESIS BI: CPT

## 2018-08-30 ENCOUNTER — HOSPITAL ENCOUNTER (OUTPATIENT)
Facility: CLINIC | Age: 83
Discharge: HOME | End: 2018-08-30
Attending: OBSTETRICS & GYNECOLOGY | Admitting: OBSTETRICS & GYNECOLOGY
Payer: MEDICARE

## 2018-08-30 ENCOUNTER — ANESTHESIA EVENT (OUTPATIENT)
Dept: SURGERY | Facility: CLINIC | Age: 83
End: 2018-08-30
Payer: MEDICARE

## 2018-08-30 ENCOUNTER — SURGERY (OUTPATIENT)
Age: 83
End: 2018-08-30

## 2018-08-30 ENCOUNTER — ANESTHESIA (OUTPATIENT)
Dept: SURGERY | Facility: CLINIC | Age: 83
End: 2018-08-30
Payer: MEDICARE

## 2018-08-30 VITALS
OXYGEN SATURATION: 99 % | HEIGHT: 64 IN | SYSTOLIC BLOOD PRESSURE: 108 MMHG | TEMPERATURE: 98.5 F | BODY MASS INDEX: 19.34 KG/M2 | RESPIRATION RATE: 16 BRPM | DIASTOLIC BLOOD PRESSURE: 60 MMHG | WEIGHT: 113.3 LBS

## 2018-08-30 DIAGNOSIS — Z90.710 S/P HYSTERECTOMY: Primary | ICD-10-CM

## 2018-08-30 LAB
ABO + RH BLD: NORMAL
ABO + RH BLD: NORMAL
BLD GP AB SCN SERPL QL: NORMAL
BLOOD BANK CMNT PATIENT-IMP: NORMAL
HGB BLD-MCNC: 12.1 G/DL (ref 11.7–15.7)
POTASSIUM SERPL-SCNC: 3.5 MMOL/L (ref 3.4–5.3)
SPECIMEN EXP DATE BLD: NORMAL

## 2018-08-30 PROCEDURE — 36000063 ZZH SURGERY LEVEL 4 EA 15 ADDTL MIN: Performed by: OBSTETRICS & GYNECOLOGY

## 2018-08-30 PROCEDURE — 36415 COLL VENOUS BLD VENIPUNCTURE: CPT | Performed by: OBSTETRICS & GYNECOLOGY

## 2018-08-30 PROCEDURE — 86900 BLOOD TYPING SEROLOGIC ABO: CPT | Performed by: OBSTETRICS & GYNECOLOGY

## 2018-08-30 PROCEDURE — 25000128 H RX IP 250 OP 636: Performed by: ANESTHESIOLOGY

## 2018-08-30 PROCEDURE — 71000027 ZZH RECOVERY PHASE 2 EACH 15 MINS: Performed by: OBSTETRICS & GYNECOLOGY

## 2018-08-30 PROCEDURE — 25000128 H RX IP 250 OP 636: Performed by: OBSTETRICS & GYNECOLOGY

## 2018-08-30 PROCEDURE — 27210794 ZZH OR GENERAL SUPPLY STERILE: Performed by: OBSTETRICS & GYNECOLOGY

## 2018-08-30 PROCEDURE — 86850 RBC ANTIBODY SCREEN: CPT | Performed by: OBSTETRICS & GYNECOLOGY

## 2018-08-30 PROCEDURE — 88305 TISSUE EXAM BY PATHOLOGIST: CPT | Performed by: OBSTETRICS & GYNECOLOGY

## 2018-08-30 PROCEDURE — A9270 NON-COVERED ITEM OR SERVICE: HCPCS | Mod: GY | Performed by: OBSTETRICS & GYNECOLOGY

## 2018-08-30 PROCEDURE — 71000012 ZZH RECOVERY PHASE 1 LEVEL 1 FIRST HR: Performed by: OBSTETRICS & GYNECOLOGY

## 2018-08-30 PROCEDURE — A9270 NON-COVERED ITEM OR SERVICE: HCPCS | Performed by: OBSTETRICS & GYNECOLOGY

## 2018-08-30 PROCEDURE — 25000125 ZZHC RX 250: Performed by: OBSTETRICS & GYNECOLOGY

## 2018-08-30 PROCEDURE — 84132 ASSAY OF SERUM POTASSIUM: CPT | Performed by: OBSTETRICS & GYNECOLOGY

## 2018-08-30 PROCEDURE — 86901 BLOOD TYPING SEROLOGIC RH(D): CPT | Performed by: OBSTETRICS & GYNECOLOGY

## 2018-08-30 PROCEDURE — 00000155 ZZHCL STATISTIC H-CELL BLOCK W/STAIN: Performed by: OBSTETRICS & GYNECOLOGY

## 2018-08-30 PROCEDURE — 37000009 ZZH ANESTHESIA TECHNICAL FEE, EACH ADDTL 15 MIN: Performed by: OBSTETRICS & GYNECOLOGY

## 2018-08-30 PROCEDURE — 25000132 ZZH RX MED GY IP 250 OP 250 PS 637: Mod: GY | Performed by: OBSTETRICS & GYNECOLOGY

## 2018-08-30 PROCEDURE — 88307 TISSUE EXAM BY PATHOLOGIST: CPT | Performed by: OBSTETRICS & GYNECOLOGY

## 2018-08-30 PROCEDURE — 71000013 ZZH RECOVERY PHASE 1 LEVEL 1 EA ADDTL HR: Performed by: OBSTETRICS & GYNECOLOGY

## 2018-08-30 PROCEDURE — 85018 HEMOGLOBIN: CPT | Performed by: OBSTETRICS & GYNECOLOGY

## 2018-08-30 PROCEDURE — 88112 CYTOPATH CELL ENHANCE TECH: CPT | Mod: 26 | Performed by: OBSTETRICS & GYNECOLOGY

## 2018-08-30 PROCEDURE — 88307 TISSUE EXAM BY PATHOLOGIST: CPT | Mod: 26 | Performed by: OBSTETRICS & GYNECOLOGY

## 2018-08-30 PROCEDURE — 40000170 ZZH STATISTIC PRE-PROCEDURE ASSESSMENT II: Performed by: OBSTETRICS & GYNECOLOGY

## 2018-08-30 PROCEDURE — 36000093 ZZH SURGERY LEVEL 4 1ST 30 MIN: Performed by: OBSTETRICS & GYNECOLOGY

## 2018-08-30 PROCEDURE — 88305 TISSUE EXAM BY PATHOLOGIST: CPT | Mod: 26 | Performed by: OBSTETRICS & GYNECOLOGY

## 2018-08-30 PROCEDURE — 88112 CYTOPATH CELL ENHANCE TECH: CPT | Performed by: OBSTETRICS & GYNECOLOGY

## 2018-08-30 PROCEDURE — 37000008 ZZH ANESTHESIA TECHNICAL FEE, 1ST 30 MIN: Performed by: OBSTETRICS & GYNECOLOGY

## 2018-08-30 PROCEDURE — 27210995 ZZH RX 272: Performed by: OBSTETRICS & GYNECOLOGY

## 2018-08-30 PROCEDURE — 25000128 H RX IP 250 OP 636: Performed by: NURSE ANESTHETIST, CERTIFIED REGISTERED

## 2018-08-30 PROCEDURE — 25000125 ZZHC RX 250: Performed by: NURSE ANESTHETIST, CERTIFIED REGISTERED

## 2018-08-30 RX ORDER — IBUPROFEN 600 MG/1
600 TABLET, FILM COATED ORAL EVERY 6 HOURS PRN
Qty: 30 TABLET | Refills: 0 | Status: SHIPPED | OUTPATIENT
Start: 2018-08-30 | End: 2021-11-03

## 2018-08-30 RX ORDER — DIAZEPAM 10 MG/2ML
2.5 INJECTION, SOLUTION INTRAMUSCULAR; INTRAVENOUS
Status: DISCONTINUED | OUTPATIENT
Start: 2018-08-30 | End: 2018-08-30 | Stop reason: HOSPADM

## 2018-08-30 RX ORDER — IBUPROFEN 600 MG/1
600 TABLET, FILM COATED ORAL
Status: DISCONTINUED | OUTPATIENT
Start: 2018-08-30 | End: 2018-08-30 | Stop reason: HOSPADM

## 2018-08-30 RX ORDER — FENTANYL CITRATE 50 UG/ML
25-100 INJECTION, SOLUTION INTRAMUSCULAR; INTRAVENOUS
Status: DISCONTINUED | OUTPATIENT
Start: 2018-08-30 | End: 2018-08-30 | Stop reason: HOSPADM

## 2018-08-30 RX ORDER — PROPOFOL 10 MG/ML
INJECTION, EMULSION INTRAVENOUS CONTINUOUS PRN
Status: DISCONTINUED | OUTPATIENT
Start: 2018-08-30 | End: 2018-08-30

## 2018-08-30 RX ORDER — NEOSTIGMINE METHYLSULFATE 1 MG/ML
VIAL (ML) INJECTION PRN
Status: DISCONTINUED | OUTPATIENT
Start: 2018-08-30 | End: 2018-08-30

## 2018-08-30 RX ORDER — OXYCODONE HYDROCHLORIDE 5 MG/1
5 TABLET ORAL ONCE
Status: COMPLETED | OUTPATIENT
Start: 2018-08-30 | End: 2018-08-30

## 2018-08-30 RX ORDER — MEPERIDINE HYDROCHLORIDE 25 MG/ML
12.5 INJECTION INTRAMUSCULAR; INTRAVENOUS; SUBCUTANEOUS
Status: DISCONTINUED | OUTPATIENT
Start: 2018-08-30 | End: 2018-08-30 | Stop reason: HOSPADM

## 2018-08-30 RX ORDER — LIDOCAINE HYDROCHLORIDE AND EPINEPHRINE 10; 10 MG/ML; UG/ML
INJECTION, SOLUTION INFILTRATION; PERINEURAL PRN
Status: DISCONTINUED | OUTPATIENT
Start: 2018-08-30 | End: 2018-08-30 | Stop reason: HOSPADM

## 2018-08-30 RX ORDER — SODIUM CHLORIDE, SODIUM LACTATE, POTASSIUM CHLORIDE, CALCIUM CHLORIDE 600; 310; 30; 20 MG/100ML; MG/100ML; MG/100ML; MG/100ML
INJECTION, SOLUTION INTRAVENOUS CONTINUOUS PRN
Status: DISCONTINUED | OUTPATIENT
Start: 2018-08-30 | End: 2018-08-30

## 2018-08-30 RX ORDER — CEFAZOLIN SODIUM 2 G/100ML
2 INJECTION, SOLUTION INTRAVENOUS
Status: COMPLETED | OUTPATIENT
Start: 2018-08-30 | End: 2018-08-30

## 2018-08-30 RX ORDER — EPHEDRINE SULFATE 50 MG/ML
INJECTION, SOLUTION INTRAMUSCULAR; INTRAVENOUS; SUBCUTANEOUS PRN
Status: DISCONTINUED | OUTPATIENT
Start: 2018-08-30 | End: 2018-08-30

## 2018-08-30 RX ORDER — LIDOCAINE HYDROCHLORIDE 20 MG/ML
INJECTION, SOLUTION INFILTRATION; PERINEURAL PRN
Status: DISCONTINUED | OUTPATIENT
Start: 2018-08-30 | End: 2018-08-30

## 2018-08-30 RX ORDER — NALOXONE HYDROCHLORIDE 0.4 MG/ML
.1-.4 INJECTION, SOLUTION INTRAMUSCULAR; INTRAVENOUS; SUBCUTANEOUS
Status: DISCONTINUED | OUTPATIENT
Start: 2018-08-30 | End: 2018-08-30 | Stop reason: HOSPADM

## 2018-08-30 RX ORDER — FENTANYL CITRATE 50 UG/ML
25-50 INJECTION, SOLUTION INTRAMUSCULAR; INTRAVENOUS
Status: DISCONTINUED | OUTPATIENT
Start: 2018-08-30 | End: 2018-08-30 | Stop reason: HOSPADM

## 2018-08-30 RX ORDER — KETOROLAC TROMETHAMINE 30 MG/ML
INJECTION, SOLUTION INTRAMUSCULAR; INTRAVENOUS PRN
Status: DISCONTINUED | OUTPATIENT
Start: 2018-08-30 | End: 2018-08-30

## 2018-08-30 RX ORDER — CEFAZOLIN SODIUM 1 G/3ML
1 INJECTION, POWDER, FOR SOLUTION INTRAMUSCULAR; INTRAVENOUS SEE ADMIN INSTRUCTIONS
Status: DISCONTINUED | OUTPATIENT
Start: 2018-08-30 | End: 2018-08-30 | Stop reason: HOSPADM

## 2018-08-30 RX ORDER — SODIUM CHLORIDE, SODIUM LACTATE, POTASSIUM CHLORIDE, CALCIUM CHLORIDE 600; 310; 30; 20 MG/100ML; MG/100ML; MG/100ML; MG/100ML
INJECTION, SOLUTION INTRAVENOUS CONTINUOUS
Status: DISCONTINUED | OUTPATIENT
Start: 2018-08-30 | End: 2018-08-30 | Stop reason: HOSPADM

## 2018-08-30 RX ORDER — GLYCOPYRROLATE 0.2 MG/ML
INJECTION, SOLUTION INTRAMUSCULAR; INTRAVENOUS PRN
Status: DISCONTINUED | OUTPATIENT
Start: 2018-08-30 | End: 2018-08-30

## 2018-08-30 RX ORDER — ACETAMINOPHEN 650 MG/1
650 SUPPOSITORY RECTAL EVERY 4 HOURS PRN
Status: DISCONTINUED | OUTPATIENT
Start: 2018-08-30 | End: 2018-08-30 | Stop reason: HOSPADM

## 2018-08-30 RX ORDER — CEFAZOLIN SODIUM 1 G/3ML
INJECTION, POWDER, FOR SOLUTION INTRAMUSCULAR; INTRAVENOUS PRN
Status: DISCONTINUED | OUTPATIENT
Start: 2018-08-30 | End: 2018-08-30

## 2018-08-30 RX ORDER — PROPOFOL 10 MG/ML
INJECTION, EMULSION INTRAVENOUS PRN
Status: DISCONTINUED | OUTPATIENT
Start: 2018-08-30 | End: 2018-08-30

## 2018-08-30 RX ORDER — OXYCODONE HYDROCHLORIDE 5 MG/1
5 TABLET ORAL
Status: COMPLETED | OUTPATIENT
Start: 2018-08-30 | End: 2018-08-30

## 2018-08-30 RX ORDER — HYDROMORPHONE HYDROCHLORIDE 1 MG/ML
.3-.5 INJECTION, SOLUTION INTRAMUSCULAR; INTRAVENOUS; SUBCUTANEOUS EVERY 10 MIN PRN
Status: DISCONTINUED | OUTPATIENT
Start: 2018-08-30 | End: 2018-08-30 | Stop reason: HOSPADM

## 2018-08-30 RX ORDER — LIDOCAINE HYDROCHLORIDE AND EPINEPHRINE 10; 10 MG/ML; UG/ML
INJECTION, SOLUTION INFILTRATION; PERINEURAL
Status: DISCONTINUED
Start: 2018-08-30 | End: 2018-08-30 | Stop reason: HOSPADM

## 2018-08-30 RX ORDER — LABETALOL HYDROCHLORIDE 5 MG/ML
10 INJECTION, SOLUTION INTRAVENOUS
Status: DISCONTINUED | OUTPATIENT
Start: 2018-08-30 | End: 2018-08-30 | Stop reason: HOSPADM

## 2018-08-30 RX ORDER — OXYCODONE HYDROCHLORIDE 5 MG/1
5-10 TABLET ORAL
Qty: 15 TABLET | Refills: 0 | Status: SHIPPED | OUTPATIENT
Start: 2018-08-30 | End: 2021-11-03

## 2018-08-30 RX ADMIN — NEOSTIGMINE METHYLSULFATE 1.5 MG: 1 INJECTION, SOLUTION INTRAVENOUS at 09:42

## 2018-08-30 RX ADMIN — PHENYLEPHRINE HYDROCHLORIDE 50 MCG: 10 INJECTION, SOLUTION INTRAMUSCULAR; INTRAVENOUS; SUBCUTANEOUS at 07:53

## 2018-08-30 RX ADMIN — LIDOCAINE HYDROCHLORIDE AND EPINEPHRINE 6 ML: 10; 10 INJECTION, SOLUTION INFILTRATION; PERINEURAL at 09:49

## 2018-08-30 RX ADMIN — ROCURONIUM BROMIDE 5 MG: 10 INJECTION INTRAVENOUS at 08:34

## 2018-08-30 RX ADMIN — DEXMEDETOMIDINE HYDROCHLORIDE 0.4 MCG/KG/HR: 100 INJECTION, SOLUTION INTRAVENOUS at 07:46

## 2018-08-30 RX ADMIN — SODIUM CHLORIDE, POTASSIUM CHLORIDE, SODIUM LACTATE AND CALCIUM CHLORIDE: 600; 310; 30; 20 INJECTION, SOLUTION INTRAVENOUS at 07:34

## 2018-08-30 RX ADMIN — OXYCODONE HYDROCHLORIDE 5 MG: 5 TABLET ORAL at 13:15

## 2018-08-30 RX ADMIN — CEFAZOLIN SODIUM 2 G: 2 INJECTION, SOLUTION INTRAVENOUS at 07:46

## 2018-08-30 RX ADMIN — FENTANYL CITRATE 50 MCG: 50 INJECTION, SOLUTION INTRAMUSCULAR; INTRAVENOUS at 07:55

## 2018-08-30 RX ADMIN — GLYCOPYRROLATE 0.3 MG: 0.2 INJECTION, SOLUTION INTRAMUSCULAR; INTRAVENOUS at 09:42

## 2018-08-30 RX ADMIN — ROCURONIUM BROMIDE 40 MG: 10 INJECTION INTRAVENOUS at 07:41

## 2018-08-30 RX ADMIN — LIDOCAINE HYDROCHLORIDE 30 MG: 20 INJECTION, SOLUTION INFILTRATION; PERINEURAL at 07:40

## 2018-08-30 RX ADMIN — Medication 40 MCG: at 07:40

## 2018-08-30 RX ADMIN — Medication 5 MG: at 08:04

## 2018-08-30 RX ADMIN — CEFAZOLIN 1 G: 1 INJECTION, POWDER, FOR SOLUTION INTRAMUSCULAR; INTRAVENOUS at 09:45

## 2018-08-30 RX ADMIN — NEOSTIGMINE METHYLSULFATE 1.5 MG: 1 INJECTION, SOLUTION INTRAVENOUS at 09:44

## 2018-08-30 RX ADMIN — Medication 1 VIAL: at 09:50

## 2018-08-30 RX ADMIN — PHENYLEPHRINE HYDROCHLORIDE 150 MCG: 10 INJECTION, SOLUTION INTRAMUSCULAR; INTRAVENOUS; SUBCUTANEOUS at 08:07

## 2018-08-30 RX ADMIN — PROPOFOL 110 MG: 10 INJECTION, EMULSION INTRAVENOUS at 07:40

## 2018-08-30 RX ADMIN — PHENYLEPHRINE HYDROCHLORIDE 100 MCG: 10 INJECTION, SOLUTION INTRAMUSCULAR; INTRAVENOUS; SUBCUTANEOUS at 08:01

## 2018-08-30 RX ADMIN — PHENYLEPHRINE HYDROCHLORIDE 50 MCG: 10 INJECTION, SOLUTION INTRAMUSCULAR; INTRAVENOUS; SUBCUTANEOUS at 07:49

## 2018-08-30 RX ADMIN — KETOROLAC TROMETHAMINE 15 MG: 30 INJECTION, SOLUTION INTRAMUSCULAR at 09:42

## 2018-08-30 RX ADMIN — GLYCOPYRROLATE 0.3 MG: 0.2 INJECTION, SOLUTION INTRAMUSCULAR; INTRAVENOUS at 09:44

## 2018-08-30 RX ADMIN — PHENYLEPHRINE HYDROCHLORIDE 50 MCG: 10 INJECTION, SOLUTION INTRAMUSCULAR; INTRAVENOUS; SUBCUTANEOUS at 08:04

## 2018-08-30 RX ADMIN — CONJUGATED ESTROGENS 1 G: 0.62 CREAM VAGINAL at 09:49

## 2018-08-30 RX ADMIN — PROPOFOL 150 MCG/KG/MIN: 10 INJECTION, EMULSION INTRAVENOUS at 07:42

## 2018-08-30 RX ADMIN — OXYCODONE HYDROCHLORIDE 5 MG: 5 TABLET ORAL at 11:42

## 2018-08-30 RX ADMIN — FENTANYL CITRATE 50 MCG: 50 INJECTION, SOLUTION INTRAMUSCULAR; INTRAVENOUS at 10:38

## 2018-08-30 NOTE — IP AVS SNAPSHOT
MRN:[id number]                      After Visit Summary   8/30/2018    Audra Hayden    MRN: [id number]           Thank you!     Thank you for choosing [facility] for your care. Our goal is always to provide you with excellent care. Hearing back from our patients is one way we can continue to improve our services. Please take a few minutes to complete the written survey that you may receive in the mail after you visit with us. Thank you!        Patient Information     Date Of Birth          1935        About your hospital stay     You were admitted on:  August 30, 2018 [facility]    You were discharged on:  August 30, 2018       Who to Call     For medical emergencies, please call 911.  For non-urgent questions about your medical care, please call your primary care provider or clinic, [phone]  For questions related to your surgery, please call your surgery clinic        Attending Provider     Provider Specialty    Clary Hernandez MD OB/Gyn       Primary Care Provider Office Phone # Fax #    [facility] [phone]      After Care Instructions     Discharge Instructions       Resume pre procedure diet            Discharge Instructions       Patient to arrange follow up appointment in 2  weeks            Discharge Instructions       Pelvic Rest. No douching or intercourse for  6  Weeks.    Continue Prometrium 1 gram vaginally nightly until f/u in 2 weeks.            Dressing       Keep dressing clean and dry, change as instructed by Provider or RN            Ice to affected area       PRN as tolerated            No alcohol       NO ALCOHOL for 24 hours post procedure            No lifting       No lifting over 15 pounds and no strenuous physical activity.  For 6 weeks            Shower        Shower on Post-op day  1.   DO NOT take a bath                  Your next 10 appointments already  scheduled     Nov 09, 2018  9:00 AM CST   Return Visit with  Oncology Nurse   [facility]    Critical access hospital [facility]  [place] [address]   [phone]            Nov 14, 2018  9:00 AM CST   Return Visit with Eloina Gross MD   [facility] ([facility]    Critical access hospital [facility]  [place] [address]   [phone]              Further instructions from your care team           DISCHARGE INSTRUCTIONS  FOLLOWING LAPAROSCOPY    ACTIVITY:  You may resume normal activities including lifting as your abdominal discomfort disappears.  You may return to work after two days if you feel well enough.  Possible abdominal and shoulder discomfort due to gas remaining in the abdomen should be gone within 48 hours.  It is permissibleto climb stairs. Showers are perfectly acceptable.  You may drive a car after 24 hours as long as you are not taking narcotic pain pills.    CHECK-UP:  You should be seen one month after discharge unless home instruction sheet states otherwise and please phone the office the day after discharge and schedule an appointment with your physician.    VAGINAL DISCHARGE:  You may have some vaginal bleeding or discharge for about a week after discharge.  You should avoid douches, tampons, and intercourse for the first week.    TEMPERATURE:  If you develop temperature levels to over 100.4  your physician should be called imlmediately.      STITCHES:  There is usually a stitch under the skin incisions which will dissolve and does not need to be removed.  The Band-Aids may be removed at any time.    DIET:  [place] or light diet is advisable the day of surgery.  If nausea persists, continue this diet.  If severe, call.    Please call the office for increasingly severe abdominal pain or vaginal bleeding in excess of one pad per hour.  This will rarely be a problem.    [facility]  [address], [address]  [place], [place] [address]  [phone]        Same Day Surgery Discharge Instructions for  Sedation and General Anesthesia       It's not unusual to feel dizzy, light-headed or faint for up to 24 hours after surgery or while taking pain medication.  If you have these symptoms: sit for a few minutes before standing and have someone assist you when you get up to walk or use the bathroom.      You should rest and relax for the next 24 hours. We recommend you make arrangements to have an adult stay with you for at least 24 hours after your discharge.  Avoid hazardous and strenuous activity.      DO NOT DRIVE any vehicle or operate mechanical equipment for 24 hours following the end of your surgery.  Even though you may feel normal, your reactions may be affected by the medication you have received.      Do not drink alcoholic beverages for 24 hours following surgery.       Slowly progress to your regular diet as you feel able. It's not unusual to feel nauseated and/or vomit after receiving anesthesia.  If you develop these symptoms, drink clear liquids (apple juice, ginger ale, broth, 7-up, etc. ) until you feel better.  If your nausea and vomiting persists for 24 hours, please notify your surgeon.        All narcotic pain medications, along with inactivity and anesthesia, can cause constipation. Drinking plenty of liquids and increasing fiber intake will help.      For any questions of a medical nature, call your surgeon.      Do not make important decisions for 24 hours.      If you had general anesthesia, you may have a sore throat for a couple of days related to the breathing tube used during surgery.  You may use Cepacol lozenges to help with this discomfort.  If it worsens or if you develop a fever, contact your surgeon.       If you feel your pain is not well managed with the pain medications prescribed by your surgeon, please contact your surgeon's office to let  "them know so they can address your concerns.         Dermabond   General Care:  Keep the wound clean and dry however, you may shower or bathe tomorrow as usual, but do not use soaps, lotions, or ointments on the wound area. Do not scrub the wound. After bathing, pat the wound dry with a soft towel.  Do not scratch, rub, or pick at the film. Do not place tape directly over the  film.  Do not apply liquids (such as peroxide), ointments, or creams to the wound while the strips or film are in place.  Most  wounds heal without problems. However, an infection sometimes occurs despite proper treatment. Therefore, watch for the signs of infection listed below.  FOLLOW UP as directed by the doctor or our staff. The  film will fall off naturally in 5 to 10 days.  CONTACT YOU SURGEON if any of the following occur:  Signs of infection:  Fever of 100.4 F (38 C) or higher, or as directed by your healthcare provider  Increasing pain in the wound  Increasing redness or swelling  Pus coming from the wound  Wound bleeds more than a small amount or bleeding doesn t stop  Wound edges come apart    While you were at the hospital today you received Toradol, an antiinflammatory medication similar to Ibuprofen.  You should not take other antiinflammatory medication, such as Ibuprofen, Motrin, Advil, Aleve, Naprosyn, etc, until 3:45 PM on 8/30/18.         **If you have questions or concerns about your procedure,  call Dr. Hernandez at [phone]**              Pending Results     Date and Time Order Name Status Description    8/30/2018 0901 Surgical pathology exam In process     8/30/2018 0811 Cytology non gyn In process             Admission Information     Date & Time Provider Department Dept. Phone    8/30/2018 Clary Hernandez MD [facility] Same Day [facility] [phone]      Your Vitals Were     Blood Pressure Temperature Respirations Height Weight Pulse Oximetry    97/55 97  F (36.1  C) (Temporal) 16 1.626 m (5' 4\") 51.4 kg (113 " "lb 4.8 oz) 94%    BMI (Body Mass Index)                   19.45 kg/m2           Adstrix Information     [facility]rix lets you send messages to your doctor, view your test results, renew your prescriptions, schedule appointments and more. To sign up, go to [email or web address] . Click on \"Log in\" on the left side of the screen, which will take you to the Welcome page. Then click on \"Sign up Now\" on the right side of the page.     You will be asked to enter the access code listed below, as well as some personal information. Please follow the directions to create your username and password.     Your access code is: [email or web address]  Expires: 2018 11:29 AM     Your access code will  in 90 days. If you need help or a new code, please call your [facility] clinic or [phone].        Care EveryWhere ID     This is your Care EveryWhere ID. This could be used by other organizations to access your [facility] medical records  [id number]        Equal Access to Services     [facility]: Hadii beto Ford, watiffanieda luwilliam, qaybta kaalmamitesh khalil, anuradha syed . So [facility] [phone].    ATENCIÓN: Si habla español, tiene a ferrer disposición servicios gratuitos de asistencia lingüística. Llame al [phone].    We comply with applicable federal civil rights laws and [place] laws. We do not discriminate on the basis of race, color, national origin, age, disability, sex, sexual orientation, or gender identity.               Review of your medicines      START taking        Dose / Directions    ibuprofen 600 MG tablet   Commonly known as:  ADVIL/MOTRIN   Used for:  S/P hysterectomy        Dose:  600 mg   Take 1 tablet (600 mg) by mouth every 6 hours as needed for pain (mild)   Quantity:  30 tablet   Refills:  0       oxyCODONE IR 5 MG tablet   Commonly known as:  ROXICODONE   Used for:  S/P hysterectomy   Notes to Patient:  One pill given at 11:42 on 18        Dose:  5-10 mg "   Take 1-2 tablets (5-10 mg) by mouth every 3 hours as needed for pain or other (Moderate to Severe)   Quantity:  15 tablet   Refills:  0         CONTINUE these medicines which have NOT CHANGED        Dose / Directions    ALEVE PO        Dose:  220 mg   Take 220 mg by mouth 2 times daily as needed for moderate pain For headaches   Refills:  0       CALCIUM + D PO        Dose:  1 capsule   Take 1 capsule by mouth daily   Refills:  0       K-DUR PO        Dose:  20 mEq   Take 20 mEq by mouth 2 times daily   Refills:  0       MAGNESIUM OXIDE PO        Dose:  2 tablet   Take 2 tablets by mouth daily as needed for leg cramps   Refills:  0       MECLIZINE HCL PO        Dose:  25 mg   Take 25 mg by mouth 3 times daily as needed for dizziness   Refills:  0       triamterene-hydrochlorothiazide 37.5-25 MG per capsule   Commonly known as:  DYAZIDE   Used for:  Hypertension        TAKE 1 CAPSULE BY MOUTH DAILY.   Quantity:  90 capsule   Refills:  0       verapamil 240 MG Cp24 24 hr capsule   Commonly known as:  VERELAN   Used for:  Hypertension        TAKE 1 CAPSULE (240 MG) BY ORAL ROUTE ONCE DAILY   Quantity:  90 capsule   Refills:  0       vitamin D 1000 units capsule        Dose:  1000 Units   Take 1,000 Units by mouth daily   Refills:  0            Where to get your medicines      These medications were sent to [facility] - [address]  [address] [facility] [address], [place] [address]     Phone:  [phone]     ibuprofen 600 MG tablet         Some of these will need a paper prescription and others can be bought over the counter. Ask your nurse if you have questions.     Bring a paper prescription for each of these medications     oxyCODONE IR 5 MG tablet                Protect others around you: Learn how to safely use, store and throw away your medicines at www.disposemymeds.org.        Information about OPIOIDS     PRESCRIPTION OPIOIDS: WHAT YOU NEED TO KNOW   We gave you an opioid  (narcotic) pain medicine. It is important to manage your pain, but opioids are not always the best choice. You should first try all the other options your care team gave you. Take this medicine for as short a time (and as few doses) as possible.    Some activities can increase your pain, such as bandage changes or therapy sessions. It may help to take your pain medicine 30 to 60 minutes before these activities. Reduce your stress by getting enough sleep, working on hobbies you enjoy and practicing relaxation or meditation. Talk to your care team about ways to manage your pain beyond prescription opioids.    These medicines have risks:    DO NOT drive when on new or higher doses of pain medicine. These medicines can affect your alertness and reaction times, and you could be arrested for driving under the influence (DUI). If you need to use opioids long-term, talk to your care team about driving.    DO NOT operate heavy machinery    DO NOT do any other dangerous activities while taking these medicines.    DO NOT drink any alcohol while taking these medicines.     If the opioid prescribed includes acetaminophen, DO NOT take with any other medicines that contain acetaminophen. Read all labels carefully. Look for the word  acetaminophen  or  Tylenol.  Ask your pharmacist if you have questions or are unsure.    You can get addicted to pain medicines, especially if you have a history of addiction (chemical, alcohol or substance dependence). Talk to your care team about ways to reduce this risk.    All opioids tend to cause constipation. Drink plenty of water and eat foods that have a lot of fiber, such as fruits, vegetables, prune juice, apple juice and high-fiber cereal. Take a laxative (Miralax, milk of magnesia, Colace, Senna) if you don t move your bowels at least every other day. Other side effects include upset stomach, sleepiness, dizziness, throwing up, tolerance (needing more of the medicine to have the same  effect), physical dependence and slowed breathing.    Store your pills in a secure place, locked if possible. We will not replace any lost or stolen medicine. If you don t finish your medicine, please throw away (dispose) as directed by your pharmacist. The [place] [employer]ution [employer] has more information about safe disposal: https://www.pca.formerly [facility].[place].us/living-green/managing-unwanted-medications             Medication List: This is a list of all your medications and when to take them. Check marks below indicate your daily home schedule. Keep this list as a reference.      Medications           Morning Afternoon Evening Bedtime As Needed    ALEVE PO   Take 220 mg by mouth 2 times daily as needed for moderate pain For headaches                                CALCIUM + D PO   Take 1 capsule by mouth daily                                ibuprofen 600 MG tablet   Commonly known as:  ADVIL/MOTRIN   Take 1 tablet (600 mg) by mouth every 6 hours as needed for pain (mild)                                K-DUR PO   Take 20 mEq by mouth 2 times daily                                MAGNESIUM OXIDE PO   Take 2 tablets by mouth daily as needed for leg cramps                                MECLIZINE HCL PO   Take 25 mg by mouth 3 times daily as needed for dizziness                                oxyCODONE IR 5 MG tablet   Commonly known as:  ROXICODONE   Take 1-2 tablets (5-10 mg) by mouth every 3 hours as needed for pain or other (Moderate to Severe)   Last time this was given:  5 mg on 8/30/2018 11:42 AM   Notes to Patient:  One pill given at 11:42 on 8/30/18                                triamterene-hydrochlorothiazide 37.5-25 MG per capsule   Commonly known as:  DYAZIDE   TAKE 1 CAPSULE BY MOUTH DAILY.                                verapamil 240 MG Cp24 24 hr capsule   Commonly known as:  VERELAN   TAKE 1 CAPSULE (240 MG) BY ORAL ROUTE ONCE DAILY                                vitamin D 1000 units capsule   Take 1,000  Units by mouth daily

## 2018-08-30 NOTE — ANESTHESIA CARE TRANSFER NOTE
Patient: Audra Hayden    Procedure(s):  COMBINED LAPAROSCOPIC ASSISTED HYSTERECTOMY VAGINAL, BILATERAL SALPINGO-OOPHORECTOMY ; PELVIC WASHINGS CYSTO - Wound Class: I-Clean   - Wound Class: II-Clean Contaminated    Diagnosis: OVARIAN CYST ; IRREGULAR ENDOMETRIUM ; UNEXPLAINED WEIGHTLOSS   Diagnosis Additional Information: No value filed.    Anesthesia Type:   General, ETT     Note:  Airway :Face Mask and Oral Airway  Patient transferred to:PACU  Comments: Neuromuscular blockade reversed after TOF 4/4, spontaneous respirations, adequate tidal volumes, followed commands to voice, oropharynx suctioned with soft flexible catheter, extubated atraumatically, extubated with suction, airway patent after extubation.  Oxygen via facemask at 10 liters per minute to PACU. Oxygen tubing connected to wall O2 in PACU, SpO2, NiBP, and EKG monitors and alarms on and functioning, Ashia Hugger warmer connected to patient gown, report on patient's clinical status given to PACU RN, RN questions answered. Handoff Report: Identifed the Patient, Identified the Reponsible Provider, Reviewed the pertinent medical history, Discussed the surgical course, Reviewed Intra-OP anesthesia mangement and issues during anesthesia, Set expectations for post-procedure period and Allowed opportunity for questions and acknowledgement of understanding      Vitals: (Last set prior to Anesthesia Care Transfer)    CRNA VITALS  8/30/2018 0929 - 8/30/2018 1005      8/30/2018             Pulse: 79    SpO2: 98 %    Resp Rate (observed): 9    Resp Rate (set): 10                Electronically Signed By: LOKESH Barba CRNA  August 30, 2018  10:05 AM

## 2018-08-30 NOTE — IP AVS SNAPSHOT
[facility]    [address]    [place] [address]    Phone:  [phone]    Fax:  [phone]                                       After Visit Summary   8/30/2018    Audra Hayden    MRN: [id number]           After Visit Summary Signature Page     I have received my discharge instructions, and my questions have been answered. I have discussed any challenges I see with this plan with the nurse or doctor.    ..........................................................................................................................................  Patient/Patient Representative Signature      ..........................................................................................................................................  Patient Representative Print Name and Relationship to Patient    ..................................................               ................................................  Date                                            Time    ..........................................................................................................................................  Reviewed by Signature/Title    ...................................................              ..............................................  Date                                                            Time          22EPIC Rev 08/18

## 2018-08-30 NOTE — OR NURSING
Patient Audra Hayden arrived from OR via cart sedated, accompanied by CRNA with an oral airway in placed.

## 2018-08-30 NOTE — ANESTHESIA POSTPROCEDURE EVALUATION
Patient: Audra Hayden    Procedure(s):  COMBINED LAPAROSCOPIC ASSISTED HYSTERECTOMY VAGINAL, BILATERAL SALPINGO-OOPHORECTOMY ; PELVIC WASHINGS CYSTO - Wound Class: I-Clean   - Wound Class: II-Clean Contaminated    Diagnosis:OVARIAN CYST ; IRREGULAR ENDOMETRIUM ; UNEXPLAINED WEIGHTLOSS   Diagnosis Additional Information: No value filed.    Anesthesia Type:  General, ETT    Note:  Anesthesia Post Evaluation    Patient location during evaluation: PACU  Patient participation: Able to fully participate in evaluation  Level of consciousness: awake  Pain management: adequate  Airway patency: patent  Cardiovascular status: acceptable  Respiratory status: acceptable  Hydration status: acceptable  PONV: controlled     Anesthetic complications: None          Last vitals:  Vitals:    08/30/18 1130 08/30/18 1145 08/30/18 1200   BP: 97/55 100/47 105/45   Resp: 16 18 16   Temp:  36.9  C (98.5  F)    SpO2: 94% 96% 96%         Electronically Signed By: Randy Linder MD  August 30, 2018  12:38 PM

## 2018-08-30 NOTE — DISCHARGE INSTRUCTIONS
DISCHARGE INSTRUCTIONS  FOLLOWING LAPAROSCOPY    ACTIVITY:  You may resume normal activities including lifting as your abdominal discomfort disappears.  You may return to work after two days if you feel well enough.  Possible abdominal and shoulder discomfort due to gas remaining in the abdomen should be gone within 48 hours.  It is permissibleto climb stairs. Showers are perfectly acceptable.  You may drive a car after 24 hours as long as you are not taking narcotic pain pills.    CHECK-UP:  You should be seen one month after discharge unless home instruction sheet states otherwise and please phone the office the day after discharge and schedule an appointment with your physician.    VAGINAL DISCHARGE:  You may have some vaginal bleeding or discharge for about a week after discharge.  You should avoid douches, tampons, and intercourse for the first week.    TEMPERATURE:  If you develop temperature levels to over 100.4  your physician should be called imlmediately.      STITCHES:  There is usually a stitch under the skin incisions which will dissolve and does not need to be removed.  The Band-Aids may be removed at any time.    DIET:  [place] or light diet is advisable the day of surgery.  If nausea persists, continue this diet.  If severe, call.    Please call the office for increasingly severe abdominal pain or vaginal bleeding in excess of one pad per hour.  This will rarely be a problem.    [facility]  [address], [address]  [place], [place] [address] [phone]        Same Day Surgery Discharge Instructions for  Sedation and General Anesthesia       It's not unusual to feel dizzy, light-headed or faint for up to 24 hours after surgery or while taking pain medication.  If you have these symptoms: sit for a few minutes before standing and have someone assist you when you get up to walk or use the bathroom.      You should rest and relax for the next 24 hours. We recommend you make  arrangements to have an adult stay with you for at least 24 hours after your discharge.  Avoid hazardous and strenuous activity.      DO NOT DRIVE any vehicle or operate mechanical equipment for 24 hours following the end of your surgery.  Even though you may feel normal, your reactions may be affected by the medication you have received.      Do not drink alcoholic beverages for 24 hours following surgery.       Slowly progress to your regular diet as you feel able. It's not unusual to feel nauseated and/or vomit after receiving anesthesia.  If you develop these symptoms, drink clear liquids (apple juice, ginger ale, broth, 7-up, etc. ) until you feel better.  If your nausea and vomiting persists for 24 hours, please notify your surgeon.        All narcotic pain medications, along with inactivity and anesthesia, can cause constipation. Drinking plenty of liquids and increasing fiber intake will help.      For any questions of a medical nature, call your surgeon.      Do not make important decisions for 24 hours.      If you had general anesthesia, you may have a sore throat for a couple of days related to the breathing tube used during surgery.  You may use Cepacol lozenges to help with this discomfort.  If it worsens or if you develop a fever, contact your surgeon.       If you feel your pain is not well managed with the pain medications prescribed by your surgeon, please contact your surgeon's office to let them know so they can address your concerns.         Dermabond   General Care:  Keep the wound clean and dry however, you may shower or bathe tomorrow as usual, but do not use soaps, lotions, or ointments on the wound area. Do not scrub the wound. After bathing, pat the wound dry with a soft towel.  Do not scratch, rub, or pick at the film. Do not place tape directly over the  film.  Do not apply liquids (such as peroxide), ointments, or creams to the wound while the strips or film are in place.  Most  wounds  heal without problems. However, an infection sometimes occurs despite proper treatment. Therefore, watch for the signs of infection listed below.  FOLLOW UP as directed by the doctor or our staff. The  film will fall off naturally in 5 to 10 days.  CONTACT YOU SURGEON if any of the following occur:  Signs of infection:  Fever of 100.4 F (38 C) or higher, or as directed by your healthcare provider  Increasing pain in the wound  Increasing redness or swelling  Pus coming from the wound  Wound bleeds more than a small amount or bleeding doesn t stop  Wound edges come apart    While you were at the hospital today you received Toradol, an antiinflammatory medication similar to Ibuprofen.  You should not take other antiinflammatory medication, such as Ibuprofen, Motrin, Advil, Aleve, Naprosyn, etc, until 3:45 PM on 8/30/18.         **If you have questions or concerns about your procedure,  call Dr. Hernandez at [phone]**

## 2018-08-30 NOTE — OP NOTE
Audra Hayden   : 1935   DOS: 2018.  Place of service: [facility]    Preop dx: ovarian cysts  Irregular endometrium with hx of tamoxifen use  Unexplained weight loss    Post op dx: same    Procedure: total laparoscopic hysterectomy, bilateral salping oophorectomy, cystoscopy, pelvic washing     Surgeon: Clary Hernandez     Anesthesia: Homer Calderon MD  Circulator: Michelle Renee RN  Relief Circulator: Daniela Wilson RN  Relief Scrub: Aide Valencia  Scrub Person: Verenice Goldberg    IV Fluids: see op note    EBL: 15 cc    Urine output: cortez, see op note for details    Implants: none    Complications:  None    Specimens: uterus, bilateral fallopian tubes and ovaries  Pelvic washings  Weight: <250  grams    Findings: retroflexed uterus with endometrial adhesions  normal bilateral fallopian tubes and ovaries  normal gallbladder and liver edge   Appendix not visualized    Implants: none    Indications: 83 year old seen in the office for unexplained weight loss.  An ultrasound was done to r/o ovarian indications for weight loss.  She has a hx of breast cancer with tamoxifen use with a known endometrial lining thickening and irregularity.  She had not had an vaginal bleeding.  Ultrasound again showed 7 mm endometrial lining with new ovarian simple cysts.   All options were reviewed and pt elected for a hysterectomy, bso and pelvic washings.    Procedure: The patient was brought to the operating room where following adequate general anesthesia and placed in the dorsolithotomy position where she was prepped and draped. A cortez catheter was placed.   A speculum was placed and the cervix was grasped with a tenaculum. Two sutures were placed on the cervix at the 3 and 9 o'clock position and the Vcare manipulator was placed.   Attention was directed to the patients abdomen where an infraumbilical incision was made. The visaport trocar was inserted under direct visualization.  The abdomen was insufflated  with 2.5 liters of CO2.   Two accessory 5 mm ports were placed in the right and left lower quadrants under direct visualization.   The upper abdomen was explored with the findings noted above.   Attention was directed to the pelvis. The pelvis was irrigated and collected for pelvic washings.  Both ureters were visualized peristalsing along the pelvic brim. The left adnexa appeared normal and the IP ligament was grasped with the ligasure. Again, the ureter was seen with peristalsis well below the grasp point and the IP was then cauterized and cut. This was carried through to the round ligament which was cauterized and cut. The broad ligament was then  into the anterior and posterior leaf and dissected down to the uterine vessels. The vessels were cauterized. A bladder flap was then made and extended to the midline. In a similar fashion the right adnexa was isolated away from the ureter and the IP was cauterized and cut. The right broad was then  into the anterior and posterior leaf and dissected down to the uterine vessels and the uterine vessels cauterized. The remainder of the bladder flap was created.   At this point, the manipulator cuff could be felt. Any further peritoneum was dissected out laterally.  An incision was made over the manipulator cuff and the carried circumferentially around to release the specimen.  The specimen was then removed vaginally.  A glove with a lap was place vaginally to keep the pneumoperitoneum.   The umbilical incision was extended to 12 mm for suture passage. The vaginal cuff was then closed with V lock suture starting at each lateral apex and overlapping medially. The pelvis was then irrigated with copious amounts of saline and hemostasis was assured.    Attention was turned vaginally where the glove with lap was removed.  The vagina and cuff was inspected and found to be intact and hemostatic.  At this time cytoscopy was performed.  The bladder was inflated and  bilateral urine jets were seen coming from the uretral openings.  Normal appearing bladder mucosa and urethra.  No injury was identified.   The abdomen was reinsufflated and the pelvis inspected. Following copious irrigation the operative field was hemostatic. There were no complications. The CO2 gas was allow to escape. The accessory ports were removed under direct visualization. The laparoscope and its sheath were removed under direct visualization. The ports were closed with dermabond. The patient's vagina was coated with premarin cream.   All sponge, needle, instrument counts were correct.  The patient was awakened and removed to the recovery room in stable condition.     Clary Hernandez

## 2018-08-30 NOTE — OR NURSING
Patient Audra Hayden started to wake up and following command.  Oral airway was discontinued and procedure was uneventful.  Oxygen delivery via simple face mask was continued.

## 2018-08-30 NOTE — ANESTHESIA PREPROCEDURE EVALUATION
Anesthesia Evaluation     . Pt has had prior anesthetic.     History of anesthetic complications   - PONV        ROS/MED HX    ENT/Pulmonary:     (+), . Other pulmonary disease 2 TMJ surgeries.   (-) sleep apnea   Neurologic:     (+)migraines,     Cardiovascular:     (+) hypertension-range: well controlled, ---. : . . . :. .       METS/Exercise Tolerance:     Hematologic:         Musculoskeletal:         GI/Hepatic:        (-) GERD   Renal/Genitourinary:     (+) Other Renal/ Genitourinary, unexplained weight loss, concern for gynecological malignancy      Endo:         Psychiatric:         Infectious Disease:         Malignancy:   (+) Malignancy (no lymph nodes taken) History of Breast  Breast CA Remission status post.         Other:                     Physical Exam  Normal systems: cardiovascular, pulmonary and dental    Airway   Mallampati: III  TM distance: >3 FB  Neck ROM: full    Dental     Cardiovascular       Pulmonary     Other findings: She limited mouth opening                Anesthesia Plan      History & Physical Review  History and physical reviewed and following examination; no interval change.    ASA Status:  3 .    NPO Status:  > 8 hours    Plan for General and ETT with Intravenous induction. Maintenance will be TIVA.    PONV prophylaxis:  Dexamethasone or Solumedrol  Propofol/Precedex TIVA  Minimize opioids  Toradol 15mg at the end of the procedure  NO ZOFRAN  Quiñonez given small mouth opening      Postoperative Care  Postoperative pain management:  IV analgesics.      Consents  Anesthetic plan, risks, benefits and alternatives discussed with:  Patient..                          .

## 2018-08-30 NOTE — PROGRESS NOTES
Admission medication history interview status for the 8/30/2018  admission is complete. See EPIC admission navigator for prior to admission medications     Medication history source reliability:Good    Medication history interview source(s):Patient    Medication history resources (including written lists, pill bottles, clinic record):None    Primary pharmacy.[facility]    Additional medication history information not noted on PTA med list :None    Time spent in this activity: 45 minutes    Prior to Admission medications    Medication Sig Last Dose Taking? Auth Provider   Calcium Carbonate-Vitamin D (CALCIUM + D PO) Take 1 capsule by mouth daily  8/29/2018 at am Yes Reported, Patient   Cholecalciferol (VITAMIN D) 1000 UNITS capsule Take 1,000 Units by mouth daily  8/29/2018 at am Yes Reported, Patient   MAGNESIUM OXIDE PO Take 2 tablets by mouth daily as needed for leg cramps  more than a week at prn Yes Reported, Patient   MECLIZINE HCL PO Take 25 mg by mouth 3 times daily as needed for dizziness  more than a month at prn Yes Reported, Patient   Naproxen Sodium (ALEVE PO) Take 220 mg by mouth 2 times daily as needed for moderate pain For headaches  more than a week at prn Yes Reported, Patient   Potassium Chloride Chantell ER (K-DUR PO) Take 20 mEq by mouth 2 times daily 8/30/2018 at 0430 Yes Reported, Patient   triamterene-hydrochlorothiazide (DYAZIDE) 37.5-25 MG per capsule TAKE 1 CAPSULE BY MOUTH DAILY. 8/30/2018 at 0430 Yes Rony Huizar MD   verapamil (VERELAN) 240 MG CP24 TAKE 1 CAPSULE (240 MG) BY ORAL ROUTE ONCE DAILY 8/30/2018 at 0430 Yes Rony Huizar MD

## 2018-08-31 LAB
COPATH REPORT: NORMAL
COPATH REPORT: NORMAL

## 2018-11-09 ENCOUNTER — HOSPITAL ENCOUNTER (OUTPATIENT)
Facility: CLINIC | Age: 83
Setting detail: SPECIMEN
Discharge: HOME | End: 2018-11-09
Attending: INTERNAL MEDICINE | Admitting: INTERNAL MEDICINE
Payer: MEDICARE

## 2018-11-09 ENCOUNTER — ONCOLOGY VISIT (OUTPATIENT)
Dept: ONCOLOGY | Facility: CLINIC | Age: 83
End: 2018-11-09
Attending: INTERNAL MEDICINE
Payer: MEDICARE

## 2018-11-09 DIAGNOSIS — Z85.3 PERSONAL HISTORY OF MALIGNANT NEOPLASM OF BREAST: ICD-10-CM

## 2018-11-09 LAB
ALBUMIN SERPL-MCNC: 3.9 G/DL (ref 3.4–5)
ALP SERPL-CCNC: 91 U/L (ref 40–150)
ALT SERPL W P-5'-P-CCNC: 14 U/L (ref 0–50)
ANION GAP SERPL CALCULATED.3IONS-SCNC: 5 MMOL/L (ref 3–14)
AST SERPL W P-5'-P-CCNC: 16 U/L (ref 0–45)
BILIRUB SERPL-MCNC: 0.4 MG/DL (ref 0.2–1.3)
BUN SERPL-MCNC: 17 MG/DL (ref 7–30)
CALCIUM SERPL-MCNC: 9.1 MG/DL (ref 8.5–10.1)
CANCER AG27-29 SERPL-ACNC: 26 U/ML (ref 0–39)
CHLORIDE SERPL-SCNC: 103 MMOL/L (ref 94–109)
CO2 SERPL-SCNC: 30 MMOL/L (ref 20–32)
CREAT SERPL-MCNC: 0.64 MG/DL (ref 0.52–1.04)
GFR SERPL CREATININE-BSD FRML MDRD: 89 ML/MIN/1.7M2
GLUCOSE SERPL-MCNC: 75 MG/DL (ref 70–99)
POTASSIUM SERPL-SCNC: 3 MMOL/L (ref 3.4–5.3)
PROT SERPL-MCNC: 8.3 G/DL (ref 6.8–8.8)
SODIUM SERPL-SCNC: 138 MMOL/L (ref 133–144)

## 2018-11-09 PROCEDURE — 80053 COMPREHEN METABOLIC PANEL: CPT | Performed by: INTERNAL MEDICINE

## 2018-11-09 PROCEDURE — 86300 IMMUNOASSAY TUMOR CA 15-3: CPT | Performed by: INTERNAL MEDICINE

## 2018-11-09 PROCEDURE — 36415 COLL VENOUS BLD VENIPUNCTURE: CPT

## 2018-11-09 NOTE — PROGRESS NOTES
Medical Assistant Note:  Audra Hayden presents today for lab only.    Patient seen by provider today: No.   present during visit today: Not Applicable.    Concerns: No Concerns.    Procedure:  Lab draw site: RAC, Needle type: BF, Gauge: 21.    Post Assessment:  Labs drawn without difficulty: Yes.    Discharge Plan:  Departure Mode: Ambulatory.    Face to Face Time: 5 minutes.    Dominga Cantor MA

## 2018-11-09 NOTE — MR AVS SNAPSHOT
After Visit Summary   11/9/2018    Audra Hayden    MRN: [id number]           Patient Information     Date Of Birth          1935        Visit Information        Provider Department      11/9/2018 9:00 AM Nurse,  Oncology [facility]        Today's Diagnoses     Personal history of malignant neoplasm of breast           Follow-ups after your visit        Your next 10 appointments already scheduled     Nov 28, 2018  9:00 AM CST   Return Visit with Eloina Gross MD   [facility] ([facility])    [facility]  [address]  [facility] [id number] [phone]              Who to contact     If you have questions or need follow up information about today's clinic visit or your schedule please contact [facility] directly at [phone].  Normal or non-critical lab and imaging results will be communicated to you by MyChart, letter or phone within 4 business days after the clinic has received the results. If you do not hear from us within 7 days, please contact the clinic through MyChart or phone. If you have a critical or abnormal lab result, we will notify you by phone as soon as possible.  Submit refill requests through [employer] or call your pharmacy and they will forward the refill request to us. Please allow 3 business days for your refill to be completed.          Additional Information About Your Visit        Care EveryWhere ID     This is your Care EveryWhere ID. This could be used by other organizations to access your [place] medical records  [id number]         Blood Pressure from Last 3 Encounters:   08/30/18 108/60   11/15/17 129/72   05/17/17 106/64    Weight from Last 3 Encounters:   08/30/18 51.4 kg (113 lb 4.8 oz)   11/15/17 57.5 kg (126 lb 12.8 oz)   05/17/17 60.3 kg (133 lb)              We Performed the Following     Ca27.29  breast tumor marker     Comprehensive metabolic panel        Primary Care  Provider Office Phone # Fax #    [facility] [phone]       [facility], [address]  [place] [address]        Equal Access to Services     CONCHITA CARNEY : Hadsanaz beto alvares raulo Soellenali, waaxda luqadaha, qaybta kaalmada simran, anuradha simmons laSamangus dior. So [facility] [phone].    ATENCIÓN: Si habla español, tiene a ferrer disposición servicios gratuitos de asistencia lingüística. Llame al [phone].    We comply with applicable federal civil rights laws and [place] laws. We do not discriminate on the basis of race, color, national origin, age, disability, sex, sexual orientation, or gender identity.            Thank you!     Thank you for choosing [facility]  for your care. Our goal is always to provide you with excellent care. Hearing back from our patients is one way we can continue to improve our services. Please take a few minutes to complete the written survey that you may receive in the mail after your visit with us. Thank you!             Your Updated Medication List - Protect others around you: Learn how to safely use, store and throw away your medicines at www.disposemymeds.org.          This list is accurate as of 11/9/18 10:15 AM.  Always use your most recent med list.                   Brand Name Dispense Instructions for use Diagnosis    ALEVE PO      Take 220 mg by mouth 2 times daily as needed for moderate pain For headaches        CALCIUM + D PO      Take 1 capsule by mouth daily        ibuprofen 600 MG tablet    ADVIL/MOTRIN    30 tablet    Take 1 tablet (600 mg) by mouth every 6 hours as needed for pain (mild)    S/P hysterectomy       K-DUR PO      Take 20 mEq by mouth 2 times daily        MAGNESIUM OXIDE PO      Take 2 tablets by mouth daily as needed for leg cramps        MECLIZINE HCL PO      Take 25 mg by mouth 3 times daily as needed for dizziness        oxyCODONE IR 5 MG tablet    ROXICODONE    15 tablet    Take  1-2 tablets (5-10 mg) by mouth every 3 hours as needed for pain or other (Moderate to Severe)    S/P hysterectomy       triamterene-hydrochlorothiazide 37.5-25 MG per capsule    DYAZIDE    90 capsule    TAKE 1 CAPSULE BY MOUTH DAILY.    Hypertension       verapamil 240 MG Cp24 24 hr capsule    VERELAN    90 capsule    TAKE 1 CAPSULE (240 MG) BY ORAL ROUTE ONCE DAILY    Hypertension       vitamin D 1000 units capsule      Take 1,000 Units by mouth daily

## 2018-11-09 NOTE — LETTER
11/9/2018         RE: Aurda Hayden  [address]  [place] [facility] [address]        Dear Colleague,    Thank you for referring your patient, Audra Hayden, to the [facility]. Please see a copy of my visit note below.    Medical Assistant Note:  Audra Hayden presents today for lab only.    Patient seen by provider today: No.   present during visit today: Not Applicable.    Concerns: No Concerns.    Procedure:  Lab draw site: RAC, Needle type: BF, Gauge: 21.    Post Assessment:  Labs drawn without difficulty: Yes.    Discharge Plan:  Departure Mode: Ambulatory.    Face to Face Time: 5 minutes.    Dominga Cantor MA              Again, thank you for allowing me to participate in the care of your patient.        Sincerely,        Oncology Nurse

## 2018-11-28 ENCOUNTER — HOSPITAL ENCOUNTER (OUTPATIENT)
Facility: CLINIC | Age: 83
Setting detail: SPECIMEN
End: 2018-11-28
Attending: INTERNAL MEDICINE
Payer: MEDICARE

## 2018-11-28 ENCOUNTER — ONCOLOGY VISIT (OUTPATIENT)
Dept: ONCOLOGY | Facility: CLINIC | Age: 83
End: 2018-11-28
Attending: INTERNAL MEDICINE
Payer: COMMERCIAL

## 2018-11-28 VITALS
DIASTOLIC BLOOD PRESSURE: 74 MMHG | RESPIRATION RATE: 16 BRPM | SYSTOLIC BLOOD PRESSURE: 124 MMHG | OXYGEN SATURATION: 98 % | HEART RATE: 67 BPM | BODY MASS INDEX: 20.19 KG/M2 | WEIGHT: 117.6 LBS

## 2018-11-28 DIAGNOSIS — M85.80 OSTEOPENIA, UNSPECIFIED LOCATION: ICD-10-CM

## 2018-11-28 DIAGNOSIS — Z12.31 BREAST CANCER SCREENING BY MAMMOGRAM: ICD-10-CM

## 2018-11-28 DIAGNOSIS — E87.6 HYPOKALEMIA: ICD-10-CM

## 2018-11-28 DIAGNOSIS — Z85.3 PERSONAL HISTORY OF MALIGNANT NEOPLASM OF BREAST: Primary | ICD-10-CM

## 2018-11-28 PROCEDURE — G0463 HOSPITAL OUTPT CLINIC VISIT: HCPCS

## 2018-11-28 PROCEDURE — 99214 OFFICE O/P EST MOD 30 MIN: CPT | Performed by: INTERNAL MEDICINE

## 2018-11-28 ASSESSMENT — PAIN SCALES - GENERAL: PAINLEVEL: NO PAIN (0)

## 2018-11-28 NOTE — LETTER
11/28/2018         RE: Audra Hayden  [id number]  [facility] [address]        Dear Colleague,    Thank you for referring your patient, Audra Hayden, to the [facility]. Please see a copy of my visit note below.    Hematology/Oncology Follow-up Visit:     Primary Care Physician- Dr.Heidi Pablo    REASON FOR VISIT: Right-sided breast cancer 2012 T1cN0 s/p lumpectomy, RT, s/p 5 yrs of anti hormonen.    HISTORY OF PRESENT ILLNESS: She was diagnosed in 6/2012 at age 77 via MA right breast cancer which revealed a mass of 1.1 cm. Biopsy done on 06/28/2012 revealed an ER/KY positive and grade 2 invasive ductal carcinoma. She then had on 07/13/2012 a right-sided lumpectomy and sentinel lymph node biopsy and pathology revealed an invasive ductal carcinoma, 1.3 cm, grade 2, margins negative, negative sentinel lymph nodes. Staging was pT1c pN0. HER2/maya by FISH was negative. Oncotype DX was 17, low risk. She had Radiation done on 10/1/12. She started Femara on 10/08/2012 and switched to Arimidex on 8/22/13 due to bony pain, then changed to tamoxifen end of April 2014 till 10/2017.     She finished total 5 yrs of anti hormone therapy in 11/2017 at age 82 due to age, endometrium polyps/ thickening and poor tolerance of AI.    She had hysterectomy, oohprectomy and salphingoophrectomy 8/2018.     PAST MEDICAL HISTORY:  Hypertension, high cholesterol, osteoporosis, SCC and basal Cell Ca of skin.    SURGERIES: Two back disk surgeries in the back, blepharoplasty, appendectomy, history of sinus surgery, history of Mohs procedure for basal cell carcinoma, left foot pin placement.     ALLERGIES: She had tremors and involuntary movements with Zofran. She had hives and throat swelling with contrast. Sulfa drugs cause a rash.   MEDICATIONS: reviewed    SOCIAL HISTORY: She is , lives in [place]. Is a never smoker and does not drink alcohol. 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 She has never had any previous breast biopsies or breast problems in the past.     FAMILY HISTORY: A sister had a mastectomy after being diagnosed with breast cancer at the age of 50. She has 2 brothers who  of cancer, one with a brain tumor and the other with a metastatic cancer probably of head and neck etiology.     REVIEW OF SYSTEMS: Energy levels is fair.   She had another Gyn exam found polyps and had D/C in 2017 with negative biopsy.    She is very energetic. She has no other complains.     PHYSICAL EXAMINATION:   VITAL SIGNS: Blood pressure 124/74, pulse 67, resp. rate 16, weight 53.3 kg (117 lb 9.6 oz), SpO2 98 %.  HEENT: Head is normocephalic, atraumatic. Eyes: PERRLA, EOMI, no icterus or pallor noted. Oropharyngeal examination was clear.   NECK: Supple, no masses felt in the bilateral cervical or supraclavicular area.   LUNGS: Clear, no wheezing or ronchi  CARDIOVASCULAR: Rate and rhythm was regular, no murmur or gallop  ABDOMEN: Soft, nontender, nondistended, no organomegaly.   BREASTS:. Bilateral axillae negative for lymphadenopathy. [place] lymph node biopsy site healing well. Right breast changes from a lumpectomy. Mild tenderness, no bruising, scar well-healed. No lumps or bumps felt in either breast:   ABDOMEN: Soft, nontender, nondistended, no organomegaly. Bowel sounds heard and normal.   EXTREMITIES: Warm and no edema, no sign of cellulitis  NEUROLOGIC: sensation intact, muscle strength and tone symmetrical all through    Current LAB Data Reviewed  K 3.0, marker is fine.       CURRENT IMAGE REVIEWED  2018 MA: NEGATIVE    Old data review and summary  2016 dexa: slight improved osteopenia.   Bone Scan 13  1. Degenerative change in the lower lumbar spine and right wrist.   2. Small focus of increased activity left. Alignment posterior frontal calvarium with possible plain film correlation showing a 1 cm sclerotic focus. This is of uncertain significance, but likelihood  of osseous metastasis is considered very low - MM work up were negative.   Mammogram 7/29/13 - The breast parenchyma is heterogeneously dense. Post lumpectomy scarring in the upper outer right breast. There is mild skin thickening in the right breast which has slightly decreased. The   left breast is unchanged. No mammographic finding of suspicion.     Dexa noted (outside report-2/3/14)- Osteoporosis (had osteopenia on 2013 Dexa).     A/p  1. [id number] right breast Ca 2012 s/p lumpectomy, RT, RS 17, s/p femera, then arimidex, then tamoxifen due to bone pain.  She finished total 5 yrs of anti hormone therapy in 11/2017 at age 82 due to age, endometrium polyps/ thickening and poor tolerance of AI.  She is due 12 months f/u with labs. MA is due July.    2. Osteoporosis, h/o falls and dizziness and balance issues.Started Prolia 60 mg sc q 6 months. Total 6 doses till fall 2016.   We talked about the proper vit D intake.   She is advised on proper dose of vit D 2000 IU per day.      3. New mild hypo K - she is on diuretics, advice double oral K supplement.     Again, thank you for allowing me to participate in the care of your patient.        Sincerely,        Eloina Gross MD, MD

## 2018-11-28 NOTE — PROGRESS NOTES
Hematology/Oncology Follow-up Visit:     Primary Care Physician- Dr.Heidi Pablo    REASON FOR VISIT: Right-sided breast cancer  T1cN0 s/p lumpectomy, RT, s/p 5 yrs of anti hormonen.    HISTORY OF PRESENT ILLNESS: She was diagnosed in 2012 at age 77 via MA right breast cancer which revealed a mass of 1.1 cm. Biopsy done on 2012 revealed an ER/WI positive and grade 2 invasive ductal carcinoma. She then had on 2012 a right-sided lumpectomy and sentinel lymph node biopsy and pathology revealed an invasive ductal carcinoma, 1.3 cm, grade 2, margins negative, negative sentinel lymph nodes. Staging was pT1c pN0. HER2/maya by FISH was negative. Oncotype DX was 17, low risk. She had Radiation done on 10/1/12. She started Femara on 10/08/2012 and switched to Arimidex on 13 due to bony pain, then changed to tamoxifen end of 2014 till 10/2017.     She finished total 5 yrs of anti hormone therapy in 2017 at age 82 due to age, endometrium polyps/ thickening and poor tolerance of AI.    She had hysterectomy, oohprectomy and salphingoophrectomy 2018.     PAST MEDICAL HISTORY:  Hypertension, high cholesterol, osteoporosis, SCC and basal Cell Ca of skin.    SURGERIES: Two back disk surgeries in the back, blepharoplasty, appendectomy, history of sinus surgery, history of Mohs procedure for basal cell carcinoma, left foot pin placement.     ALLERGIES: She had tremors and involuntary movements with Zofran. She had hives and throat swelling with contrast. Sulfa drugs cause a rash.   MEDICATIONS: reviewed    SOCIAL HISTORY: She is , lives in [place]. Is a never smoker and does not drink alcohol. 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     FAMILY HISTORY: A sister had a mastectomy after being diagnosed with breast cancer at the age of 50. She has 2 brothers who  of cancer, one with a brain  tumor and the other with a metastatic cancer probably of head and neck etiology.     REVIEW OF SYSTEMS: Energy levels is fair.   She had another Gyn exam found polyps and had D/C in 2/2017 with negative biopsy.    She is very energetic. She has no other complains.     PHYSICAL EXAMINATION:   VITAL SIGNS: Blood pressure 124/74, pulse 67, resp. rate 16, weight 53.3 kg (117 lb 9.6 oz), SpO2 98 %.  HEENT: Head is normocephalic, atraumatic. Eyes: PERRLA, EOMI, no icterus or pallor noted. Oropharyngeal examination was clear.   NECK: Supple, no masses felt in the bilateral cervical or supraclavicular area.   LUNGS: Clear, no wheezing or ronchi  CARDIOVASCULAR: Rate and rhythm was regular, no murmur or gallop  ABDOMEN: Soft, nontender, nondistended, no organomegaly.   BREASTS:. Bilateral axillae negative for lymphadenopathy. [place] lymph node biopsy site healing well. Right breast changes from a lumpectomy. Mild tenderness, no bruising, scar well-healed. No lumps or bumps felt in either breast:   ABDOMEN: Soft, nontender, nondistended, no organomegaly. Bowel sounds heard and normal.   EXTREMITIES: Warm and no edema, no sign of cellulitis  NEUROLOGIC: sensation intact, muscle strength and tone symmetrical all through    Current LAB Data Reviewed  K 3.0, marker is fine.       CURRENT IMAGE REVIEWED  7/2018 MA: NEGATIVE    Old data review and summary  11/2016 dexa: slight improved osteopenia.   Bone Scan 8/28/13  1. Degenerative change in the lower lumbar spine and right wrist.   2. Small focus of increased activity left. Alignment posterior frontal calvarium with possible plain film correlation showing a 1 cm sclerotic focus. This is of uncertain significance, but likelihood of osseous metastasis is considered very low - MM work up were negative.   Mammogram 7/29/13 - The breast parenchyma is heterogeneously dense. Post lumpectomy scarring in the upper outer right breast. There is mild skin thickening in the right breast  which has slightly decreased. The   left breast is unchanged. No mammographic finding of suspicion.     Dexa noted (outside report-2/3/14)- Osteoporosis (had osteopenia on 2013 Dexa).     A/p  1. [id number] right breast Ca 2012 s/p lumpectomy, RT, RS 17, s/p femera, then arimidex, then tamoxifen due to bone pain.  She finished total 5 yrs of anti hormone therapy in 11/2017 at age 82 due to age, endometrium polyps/ thickening and poor tolerance of AI.  She is due 12 months f/u with labs. MA is due July.    2. Osteoporosis, h/o falls and dizziness and balance issues.Started Prolia 60 mg sc q 6 months. Total 6 doses till fall 2016.   We talked about the proper vit D intake.   She is advised on proper dose of vit D 2000 IU per day.      3. New mild hypo K - she is on diuretics, advice double oral K supplement.

## 2018-11-28 NOTE — MR AVS SNAPSHOT
After Visit Summary   11/28/2018    Audra Hayden    MRN: [id number]           Patient Information     Date Of Birth          1935        Visit Information        Provider Department      11/28/2018 9:00 AM Eloina Gross MD [facility]        Today's Diagnoses     Personal history of malignant neoplasm of breast    -  1    Osteopenia, unspecified location        Hypokalemia        Breast cancer screening by mammogram          Care Instructions    1 yr f/u with labs. Print out CMP to pt.           Follow-ups after your visit        Your next 10 appointments already scheduled     Nov 08, 2019  9:15 AM CST   Return Visit with  Oncology Nurse   [facility] ([facility])    [facility]  [address]  [place] [id number] [phone]            Nov 13, 2019  9:20 AM CST   Return Visit with Eloina Gross MD   [facility] [place] [facility] ([facility])    [facility]  [address]  [place] [id number] [phone]              Future tests that were ordered for you today     Open Future Orders        Priority Expected Expires Ordered    Ca27.29  breast tumor marker Routine 10/1/2019 11/28/2019 11/28/2018    Comprehensive metabolic panel Routine 10/1/2019 11/28/2019 11/28/2018    MA Screen Bilateral w/Yon Routine 7/1/2019 11/28/2019 11/28/2018            Who to contact     If you have questions or need follow up information about today's clinic visit or your schedule please contact [facility] directly at [phone].  Normal or non-critical lab and imaging results will be communicated to you by MyChart, letter or phone within 4 business days after the clinic has received the results. If you do not hear from us within 7 days, please contact the clinic through MyChart or phone. If you have a critical or abnormal lab result, we will notify you by phone as soon as  possible.  Submit refill requests through [facility] or call your pharmacy and they will forward the refill request to us. Please allow 3 business days for your refill to be completed.          Additional Information About Your Visit        Care EveryWhere ID     This is your Care EveryWhere ID. This could be used by other organizations to access your [place] medical records  [id number]        Your Vitals Were     Pulse Respirations Pulse Oximetry BMI (Body Mass Index)          67 16 98% 20.19 kg/m2         Blood Pressure from Last 3 Encounters:   11/28/18 124/74   08/30/18 108/60   11/15/17 129/72    Weight from Last 3 Encounters:   11/28/18 53.3 kg (117 lb 9.6 oz)   08/30/18 51.4 kg (113 lb 4.8 oz)   11/15/17 57.5 kg (126 lb 12.8 oz)               Primary Care Provider Office Phone # Fax #    [facility] [phone]       [address] [place] [address], [id number]  [facility] [address]        Equal Access to Services     CONCHITA CARNEY : Hadii beto ku hadasho Soomaali, waaxda luqadaha, qaybta kaalmada adeegyada, anuradha syed . So [facility] [phone].    ATENCIÓN: Si habla español, tiene a ferrer disposición servicios gratuitos de asistencia lingüística. Ll[facility] [phone].    We comply with applicable federal civil rights laws and [place] laws. We do not discriminate on the basis of race, color, national origin, age, disability, sex, sexual orientation, or gender identity.            Thank you!     Thank you for choosing [facility]  for your care. Our goal is always to provide you with excellent care. Hearing back from our patients is one way we can continue to improve our services. Please take a few minutes to complete the written survey that you may receive in the mail after your visit with us. Thank you!             Your Updated Medication List - Protect others around you: Learn how to safely use, store and throw away your medicines at  www.disposemymeds.org.          This list is accurate as of 11/28/18  9:33 AM.  Always use your most recent med list.                   Brand Name Dispense Instructions for use Diagnosis    ALEVE PO      Take 220 mg by mouth 2 times daily as needed for moderate pain For headaches        CALCIUM + D PO      Take 1 capsule by mouth daily        ibuprofen 600 MG tablet    ADVIL/MOTRIN    30 tablet    Take 1 tablet (600 mg) by mouth every 6 hours as needed for pain (mild)    S/P hysterectomy       K-DUR PO      Take 20 mEq by mouth 2 times daily        MAGNESIUM OXIDE PO      Take 2 tablets by mouth daily as needed for leg cramps        MECLIZINE HCL PO      Take 25 mg by mouth 3 times daily as needed for dizziness        oxyCODONE 5 MG tablet    ROXICODONE    15 tablet    Take 1-2 tablets (5-10 mg) by mouth every 3 hours as needed for pain or other (Moderate to Severe)    S/P hysterectomy       triamterene-HCTZ 37.5-25 MG capsule    DYAZIDE    90 capsule    TAKE 1 CAPSULE BY MOUTH DAILY.    Hypertension       verapamil 240 MG Cp24 24 hr capsule    VERELAN    90 capsule    TAKE 1 CAPSULE (240 MG) BY ORAL ROUTE ONCE DAILY    Hypertension       vitamin D 1000 units capsule      Take 1,000 Units by mouth daily

## 2018-11-28 NOTE — PATIENT INSTRUCTIONS
1 yr f/u with labs.  Scheduled/saida   Print out CMP to pt.       AVS printed & given to patient/saida

## 2019-07-24 ENCOUNTER — HOSPITAL ENCOUNTER (OUTPATIENT)
Dept: MAMMOGRAPHY | Facility: CLINIC | Age: 84
Discharge: HOME | End: 2019-07-24
Attending: INTERNAL MEDICINE | Admitting: INTERNAL MEDICINE
Payer: COMMERCIAL

## 2019-07-24 DIAGNOSIS — Z12.31 BREAST CANCER SCREENING BY MAMMOGRAM: ICD-10-CM

## 2019-07-24 PROCEDURE — 77063 BREAST TOMOSYNTHESIS BI: CPT

## 2019-11-08 ENCOUNTER — INFUSION THERAPY VISIT (OUTPATIENT)
Dept: INFUSION THERAPY | Facility: CLINIC | Age: 84
End: 2019-11-08
Attending: INTERNAL MEDICINE
Payer: COMMERCIAL

## 2019-11-08 ENCOUNTER — HOSPITAL ENCOUNTER (OUTPATIENT)
Facility: CLINIC | Age: 84
Setting detail: SPECIMEN
Discharge: HOME | End: 2019-11-08
Attending: INTERNAL MEDICINE | Admitting: INTERNAL MEDICINE
Payer: COMMERCIAL

## 2019-11-08 DIAGNOSIS — Z85.3 PERSONAL HISTORY OF MALIGNANT NEOPLASM OF BREAST: ICD-10-CM

## 2019-11-08 LAB
ALBUMIN SERPL-MCNC: 4 G/DL (ref 3.4–5)
ALP SERPL-CCNC: 88 U/L (ref 40–150)
ALT SERPL W P-5'-P-CCNC: 14 U/L (ref 0–50)
ANION GAP SERPL CALCULATED.3IONS-SCNC: 6 MMOL/L (ref 3–14)
AST SERPL W P-5'-P-CCNC: 20 U/L (ref 0–45)
BILIRUB SERPL-MCNC: 0.4 MG/DL (ref 0.2–1.3)
BUN SERPL-MCNC: 17 MG/DL (ref 7–30)
CALCIUM SERPL-MCNC: 9.7 MG/DL (ref 8.5–10.1)
CANCER AG27-29 SERPL-ACNC: 26 U/ML (ref 0–39)
CHLORIDE SERPL-SCNC: 101 MMOL/L (ref 94–109)
CO2 SERPL-SCNC: 31 MMOL/L (ref 20–32)
CREAT SERPL-MCNC: 0.65 MG/DL (ref 0.52–1.04)
GFR SERPL CREATININE-BSD FRML MDRD: 82 ML/MIN/{1.73_M2}
GLUCOSE SERPL-MCNC: 79 MG/DL (ref 70–99)
POTASSIUM SERPL-SCNC: 3.1 MMOL/L (ref 3.4–5.3)
PROT SERPL-MCNC: 8.3 G/DL (ref 6.8–8.8)
SODIUM SERPL-SCNC: 138 MMOL/L (ref 133–144)

## 2019-11-08 PROCEDURE — 80053 COMPREHEN METABOLIC PANEL: CPT | Performed by: INTERNAL MEDICINE

## 2019-11-08 PROCEDURE — 36415 COLL VENOUS BLD VENIPUNCTURE: CPT

## 2019-11-08 PROCEDURE — 86300 IMMUNOASSAY TUMOR CA 15-3: CPT | Performed by: INTERNAL MEDICINE

## 2019-11-08 NOTE — PROGRESS NOTES
Medical Assistant Note:  Audra Hayden presents today for blood draw.    Patient seen by provider today: No.   present during visit today: Not Applicable.    Concerns: No Concerns.    Procedure:  Lab draw site: LAC, Needle type: BF, Gauge: 23.    Post Assessment:  Labs drawn without difficulty: Yes.    Discharge Plan:  Departure Mode: Ambulatory.    Face to Face Time: 5 MIN  .    Loly Reid, CMA

## 2019-11-13 ENCOUNTER — ONCOLOGY VISIT (OUTPATIENT)
Dept: ONCOLOGY | Facility: CLINIC | Age: 84
End: 2019-11-13
Attending: INTERNAL MEDICINE
Payer: COMMERCIAL

## 2019-11-13 ENCOUNTER — HOSPITAL ENCOUNTER (OUTPATIENT)
Facility: CLINIC | Age: 84
Setting detail: SPECIMEN
End: 2019-11-13
Attending: INTERNAL MEDICINE
Payer: COMMERCIAL

## 2019-11-13 VITALS
DIASTOLIC BLOOD PRESSURE: 68 MMHG | HEART RATE: 68 BPM | TEMPERATURE: 96 F | RESPIRATION RATE: 16 BRPM | BODY MASS INDEX: 20.19 KG/M2 | WEIGHT: 117.6 LBS | SYSTOLIC BLOOD PRESSURE: 116 MMHG | OXYGEN SATURATION: 99 %

## 2019-11-13 DIAGNOSIS — E87.6 HYPOKALEMIA: ICD-10-CM

## 2019-11-13 DIAGNOSIS — M85.80 OSTEOPENIA, UNSPECIFIED LOCATION: ICD-10-CM

## 2019-11-13 DIAGNOSIS — Z12.31 SCREENING MAMMOGRAM, ENCOUNTER FOR: ICD-10-CM

## 2019-11-13 DIAGNOSIS — Z85.3 PERSONAL HISTORY OF MALIGNANT NEOPLASM OF BREAST: Primary | ICD-10-CM

## 2019-11-13 PROCEDURE — G0463 HOSPITAL OUTPT CLINIC VISIT: HCPCS

## 2019-11-13 PROCEDURE — 99214 OFFICE O/P EST MOD 30 MIN: CPT | Performed by: INTERNAL MEDICINE

## 2019-11-13 ASSESSMENT — PAIN SCALES - GENERAL: PAINLEVEL: NO PAIN (0)

## 2019-11-13 NOTE — PROGRESS NOTES
"Oncology Rooming Note    November 13, 2019 9:21 AM   Audra Hayden is a 84 year old female who presents for:    Chief Complaint   Patient presents with     Oncology Clinic Visit     Breast cancer (H)     Initial Vitals: /68 (BP Location: Right arm, Patient Position: Sitting, Cuff Size: Adult Regular)   Pulse 68   Temp 96  F (35.6  C)   Resp 16   Wt 53.3 kg (117 lb 9.6 oz)   SpO2 99%   BMI 20.19 kg/m   Estimated body mass index is 20.19 kg/m  as calculated from the following:    Height as of 8/30/18: 1.626 m (5' 4\").    Weight as of this encounter: 53.3 kg (117 lb 9.6 oz). Body surface area is 1.55 meters squared.  No Pain (0) Comment: Data Unavailable   No LMP recorded. Patient is postmenopausal.  Allergies reviewed: Yes  Medications reviewed: Yes    Medications: Medication refills not needed today.  Pharmacy name entered into [facility]:    [id number] - [place], [place] - [address] [facility] [place]  K[place]EN [place] - [address] [place] [address]    Clinical concerns: no        Shirin Martinez CMA        "

## 2019-11-13 NOTE — PROGRESS NOTES
Hematology/Oncology Follow-up Visit:     Primary Care Physician- Dr.Heidi Pablo    REASON FOR VISIT/CC: Right-sided breast cancer 2012 T1cN0 s/p lumpectomy, RT, s/p 5 yrs of anti hormonen.    HISTORY OF ONCOLOGY ILLNESS: She was diagnosed in 6/2012 at age 77 via MA right breast cancer which revealed a mass of 1.1 cm. Biopsy done on 06/28/2012 revealed an ER/MI positive and grade 2 invasive ductal carcinoma. She then had on 07/13/2012 a right-sided lumpectomy and sentinel lymph node biopsy and pathology revealed an invasive ductal carcinoma, 1.3 cm, grade 2, margins negative, negative sentinel lymph nodes. Staging was pT1c pN0. HER2/maya by FISH was negative. Oncotype RS was 17, low risk. She had Radiation done on 10/1/12. She started Femara on 10/08/2012 and switched to Arimidex on 8/22/13 due to bony pain, then changed to tamoxifen end of April 2014 till 10/2017.   She finished total 5 yrs of anti hormone therapy in 11/2017 at age 82 due to age, endometrium polyps/ thickening and poor tolerance of AI.  She had hysterectomy, oohprectomy and salphingoophrectomy 8/2018.     INTERVAL HISTORY:  She is battling with chronic diarrhea and had colonoscopy with negative findings.     PAST MEDICAL HISTORY:  Hypertension, high cholesterol, osteoporosis, SCC and basal Cell Ca of skin.    SURGERIES: Two back disk surgeries in the back, blepharoplasty, appendectomy, history of sinus surgery, history of Mohs procedure for basal cell carcinoma, left foot pin placement.     ALLERGIES: She had tremors and involuntary movements with Zofran. She had hives and throat swelling with contrast. Sulfa drugs cause a rash.   MEDICATIONS: reviewed    SOCIAL HISTORY: She is , lives in [place]. Is a never smoker and does not drink alcohol. 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     FAMILY HISTORY: A sister had a mastectomy after  being diagnosed with breast cancer at the age of 50. She has 2 brothers who  of cancer, one with a brain tumor and the other with a metastatic cancer probably of head and neck etiology.     REVIEW OF SYSTEMS:   Energy levels is fair. She is very energetic. Her diarrhea is better.     PHYSICAL EXAMINATION:   VITAL SIGNS: Blood pressure 116/68, pulse 68, temperature 96  F (35.6  C), resp. rate 16, weight 53.3 kg (117 lb 9.6 oz), SpO2 99 %.  HEENT: Head is normocephalic, atraumatic. Eyes: PERRLA, EOMI, no icterus or pallor noted. Oropharyngeal examination was clear.   NECK: Supple, no masses felt in the bilateral cervical or supraclavicular area.   LUNGS: Clear, no wheezing or ronchi  CARDIOVASCULAR: Rate and rhythm was regular, no murmur or gallop  ABDOMEN: Soft, nontender, nondistended, no organomegaly.   BREASTS:. Bilateral axillae negative for lymphadenopathy. [place] lymph node biopsy site healing well. Right breast changes from a lumpectomy. Mild tenderness, no bruising, scar well-healed. No lumps or bumps felt in either breast:   ABDOMEN: Soft, nontender, nondistended, no organomegaly. Bowel sounds heard and normal.   EXTREMITIES: Warm and no edema, no sign of cellulitis  NEUROLOGIC: sensation intact, muscle strength and tone symmetrical all through    Current LAB Data Reviewed  K 3.1, marker is fine.       CURRENT IMAGE REVIEWED  2019 MA: NEGATIVE    Old data review and summary  2016 dexa: slight improved osteopenia.   Bone Scan 13  1. Degenerative change in the lower lumbar spine and right wrist.   2. Small focus of increased activity left. Alignment posterior frontal calvarium with possible plain film correlation showing a 1 cm sclerotic focus. This is of uncertain significance, but likelihood of osseous metastasis is considered very low - MM work up were negative.   Mammogram 13 - The breast parenchyma is heterogeneously dense. Post lumpectomy scarring in the upper outer right breast.  There is mild skin thickening in the right breast which has slightly decreased. The   left breast is unchanged. No mammographic finding of suspicion.     Dexa noted (outside report-2/3/14)- Osteoporosis (had osteopenia on 2013 Dexa).     A/p  1. [id number] right breast Ca 2012 s/p lumpectomy, RT, RS 17, s/p femera, then arimidex, then tamoxifen due to bone pain.  She finished total 5 yrs of anti hormone therapy in 11/2017 at age 82 due to age, endometrium polyps/ thickening and poor tolerance of AI.  She is due 12 months f/u with labs. MA is due July.    2. Osteoporosis/osteopenia, h/o falls and dizziness and balance issues.Started Prolia 60 mg sc q 6 months. Total 6 doses till fall 2016.   We talked about the proper vit D intake.   She is advised on proper dose of vit D 2000 IU per day.      3. New mild hypo K - she is on diuretics, and she is also having diarrhea problem.  She is to continue oral K supplement.

## 2019-11-13 NOTE — LETTER
"    11/13/2019         RE: Audra Hayden  [address] [facility] [address]        Dear Colleague,    Thank you for referring your patient, Audra Hayden, to the [facility]. Please see a copy of my visit note below.    Oncology Rooming Note    November 13, 2019 9:21 AM   Audra Hayden is a 84 year old female who presents for:    Chief Complaint   Patient presents with     Oncology Clinic Visit     Breast cancer (H)     Initial Vitals: /68 (BP Location: Right arm, Patient Position: Sitting, Cuff Size: Adult Regular)   Pulse 68   Temp 96  F (35.6  C)   Resp 16   Wt 53.3 kg (117 lb 9.6 oz)   SpO2 99%   BMI 20.19 kg/m    Estimated body mass index is 20.19 kg/m  as calculated from the following:    Height as of 8/30/18: 1.626 m (5' 4\").    Weight as of this encounter: 53.3 kg (117 lb 9.6 oz). Body surface area is 1.55 meters squared.  No Pain (0) Comment: Data Unavailable   No LMP recorded. Patient is postmenopausal.  Allergies reviewed: Yes  Medications reviewed: Yes    Medications: Medication refills not needed today.  Pharmacy name entered into [facility]:    [id number] - [place], [place] [address] [facility] [place] [address] [facility]    Clinical concerns: no        Shirin Martinez CMA          Hematology/Oncology Follow-up Visit:     Primary Care Physician- Dr.Heidi Pablo    REASON FOR VISIT/CC: Right-sided breast cancer 2012 T1cN0 s/p lumpectomy, RT, s/p 5 yrs of anti hormonen.    HISTORY OF ONCOLOGY ILLNESS: She was diagnosed in 6/2012 at age 77 via MA right breast cancer which revealed a mass of 1.1 cm. Biopsy done on 06/28/2012 revealed an ER/VT positive and grade 2 invasive ductal carcinoma. She then had on 07/13/2012 a right-sided lumpectomy and sentinel lymph node biopsy and pathology revealed an invasive ductal carcinoma, 1.3 cm, grade 2, margins negative, negative sentinel lymph nodes. Staging was pT1c pN0. HER2/maya by FISH was negative. " Oncotype RS was 17, low risk. She had Radiation done on 10/1/12. She started Femara on 10/08/2012 and switched to Arimidex on 13 due to bony pain, then changed to tamoxifen end of 2014 till 10/2017.   She finished total 5 yrs of anti hormone therapy in 2017 at age 82 due to age, endometrium polyps/ thickening and poor tolerance of AI.  She had hysterectomy, oohprectomy and salphingoophrectomy 2018.     INTERVAL HISTORY:  She is battling with chronic diarrhea and had colonoscopy with negative findings.     PAST MEDICAL HISTORY:  Hypertension, high cholesterol, osteoporosis, SCC and basal Cell Ca of skin.    SURGERIES: Two back disk surgeries in the back, blepharoplasty, appendectomy, history of sinus surgery, history of Mohs procedure for basal cell carcinoma, left foot pin placement.     ALLERGIES: She had tremors and involuntary movements with Zofran. She had hives and throat swelling with contrast. Sulfa drugs cause a rash.   MEDICATIONS: reviewed    SOCIAL HISTORY: She is , lives in [place]. Is a never smoker and does not drink alcohol. 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     FAMILY HISTORY: A sister had a mastectomy after being diagnosed with breast cancer at the age of 50. She has 2 brothers who  of cancer, one with a brain tumor and the other with a metastatic cancer probably of head and neck etiology.     REVIEW OF SYSTEMS:   Energy levels is fair. She is very energetic. Her diarrhea is better.     PHYSICAL EXAMINATION:   VITAL SIGNS: Blood pressure 116/68, pulse 68, temperature 96  F (35.6  C), resp. rate 16, weight 53.3 kg (117 lb 9.6 oz), SpO2 99 %.  HEENT: Head is normocephalic, atraumatic. Eyes: PERRLA, EOMI, no icterus or pallor noted. Oropharyngeal examination was clear.   NECK: Supple, no masses felt in the bilateral cervical or supraclavicular area.   LUNGS:  Clear, no wheezing or ronchi  CARDIOVASCULAR: Rate and rhythm was regular, no murmur or gallop  ABDOMEN: Soft, nontender, nondistended, no organomegaly.   BREASTS:. Bilateral axillae negative for lymphadenopathy. [place] lymph node biopsy site healing well. Right breast changes from a lumpectomy. Mild tenderness, no bruising, scar well-healed. No lumps or bumps felt in either breast:   ABDOMEN: Soft, nontender, nondistended, no organomegaly. Bowel sounds heard and normal.   EXTREMITIES: Warm and no edema, no sign of cellulitis  NEUROLOGIC: sensation intact, muscle strength and tone symmetrical all through    Current LAB Data Reviewed  K 3.1, marker is fine.       CURRENT IMAGE REVIEWED  7/2019 MA: NEGATIVE    Old data review and summary  11/2016 dexa: slight improved osteopenia.   Bone Scan 8/28/13  1. Degenerative change in the lower lumbar spine and right wrist.   2. Small focus of increased activity left. Alignment posterior frontal calvarium with possible plain film correlation showing a 1 cm sclerotic focus. This is of uncertain significance, but likelihood of osseous metastasis is considered very low - MM work up were negative.   Mammogram 7/29/13 - The breast parenchyma is heterogeneously dense. Post lumpectomy scarring in the upper outer right breast. There is mild skin thickening in the right breast which has slightly decreased. The   left breast is unchanged. No mammographic finding of suspicion.     Dexa noted (outside report-2/3/14)- Osteoporosis (had osteopenia on 2013 Dexa).     A/p  1. [id number] right breast Ca 2012 s/p lumpectomy, RT, RS 17, s/p femera, then arimidex, then tamoxifen due to bone pain.  She finished total 5 yrs of anti hormone therapy in 11/2017 at age 82 due to age, endometrium polyps/ thickening and poor tolerance of AI.  She is due 12 months f/u with labs. MA is due July.    2. Osteoporosis/osteopenia, h/o falls and dizziness and balance issues.Started Prolia 60 mg sc q 6 months.  Total 6 doses till fall 2016.   We talked about the proper vit D intake.   She is advised on proper dose of vit D 2000 IU per day.      3. New mild hypo K - she is on diuretics, and she is also having diarrhea problem.  She is to continue oral K supplement.     Again, thank you for allowing me to participate in the care of your patient.        Sincerely,        Eloina Gross MD, MD

## 2019-12-16 ENCOUNTER — HOSPITAL ENCOUNTER (EMERGENCY)
Facility: CLINIC | Age: 84
Discharge: HOME | End: 2019-12-16
Attending: NURSE PRACTITIONER | Admitting: NURSE PRACTITIONER
Payer: COMMERCIAL

## 2019-12-16 VITALS
DIASTOLIC BLOOD PRESSURE: 63 MMHG | RESPIRATION RATE: 16 BRPM | BODY MASS INDEX: 19.63 KG/M2 | OXYGEN SATURATION: 99 % | WEIGHT: 115 LBS | SYSTOLIC BLOOD PRESSURE: 170 MMHG | TEMPERATURE: 97.3 F | HEIGHT: 64 IN | HEART RATE: 92 BPM

## 2019-12-16 DIAGNOSIS — S61.219A FINGER LACERATION: ICD-10-CM

## 2019-12-16 PROCEDURE — 90471 IMMUNIZATION ADMIN: CPT

## 2019-12-16 PROCEDURE — 25000128 H RX IP 250 OP 636: Performed by: NURSE PRACTITIONER

## 2019-12-16 PROCEDURE — 99283 EMERGENCY DEPT VISIT LOW MDM: CPT | Mod: 25

## 2019-12-16 PROCEDURE — 12001 RPR S/N/AX/GEN/TRNK 2.5CM/<: CPT

## 2019-12-16 PROCEDURE — 90715 TDAP VACCINE 7 YRS/> IM: CPT | Performed by: NURSE PRACTITIONER

## 2019-12-16 RX ADMIN — CLOSTRIDIUM TETANI TOXOID ANTIGEN (FORMALDEHYDE INACTIVATED), CORYNEBACTERIUM DIPHTHERIAE TOXOID ANTIGEN (FORMALDEHYDE INACTIVATED), BORDETELLA PERTUSSIS TOXOID ANTIGEN (GLUTARALDEHYDE INACTIVATED), BORDETELLA PERTUSSIS FILAMENTOUS HEMAGGLUTININ ANTIGEN (FORMALDEHYDE INACTIVATED), BORDETELLA PERTUSSIS PERTACTIN ANTIGEN, AND BORDETELLA PERTUSSIS FIMBRIAE 2/3 ANTIGEN 0.5 ML: 5; 2; 2.5; 5; 3; 5 INJECTION, SUSPENSION INTRAMUSCULAR at 15:50

## 2019-12-16 ASSESSMENT — ENCOUNTER SYMPTOMS
WOUND: 1
NUMBNESS: 0

## 2019-12-16 ASSESSMENT — MIFFLIN-ST. JEOR: SCORE: 961.64

## 2019-12-16 NOTE — ED PROVIDER NOTES
History     Chief Complaint:  Finger Laceration      HPI   Audra Hayden is a 84 year old female who presents to the emergency department for evaluation of right middle finger laceration. Of note, the patient is right handed. She was using a knife trying to cut food and she cut her middle finger. The patient last Tdap was in 2005. The patient reports wound. The patient denies numbness. The patient is currently not on any blood thinner medications.    Allergies:  Contrast Dye  Zofran  Sulfa drugs  Some anesthetics    Medications:    Meclizine  Aleve  Roxicodone  K-dur  Dyazide  Verelan    Past Medical History:    Impingement syndrome of shoulder region, right  Neck pain  Osteoporosis due to aromatase inhibitor  Osteopenia  Breast cancer  Hyperlipidemia  Vertigo  Hypertension  Hypertension  Hypokalemia  Ophthalmic migraine  Osteoporosis  PONV  Skin cancer  Thickened endometrium    Past Surgical History:    Appendectomy  Arthroscopy knee, bilateral  Basal cell  Biopsy breast needle localization, biopsy node sentinel, combined  Colonoscopy  CT temporomandibular joints  Cystoscopy  Dilation and curettage, operative hysteroscopy with morcellator, combined  Discectomy lumbar anterior  Foot surgery  HC blepharoplasty upper lid w excess skin  Laparoscopic assisted hysterectomy vaginal, bilateral salpingo-oophorectomy, combined  Lumpectomy breast  MOHS micrographic procedure  Operative hysteroscopy with morcellator  Sinus surgery    Family History:    No past pertinent family history.    Social History:  The patient presents today alone.  Never smoker  Negative for alcohol use.  Negative for drug use.  Marital Status:      Review of Systems   Skin: Positive for wound.   Neurological: Negative for numbness.   All other systems reviewed and are negative.    Physical Exam     Patient Vitals for the past 24 hrs:   BP Temp Temp src Pulse Heart Rate Resp SpO2 Height Weight   12/16/19 1514 (!) 170/63 97.3  F (36.3  C)  "Temporal 92 92 16 99 % 1.626 m (5' 4\") 52.2 kg (115 lb)     Physical Exam  Nursing notes reviewed. Vitals reviewed.  General: Alert. Well kept.  Eyes:  Conjunctiva non-injected, non-icteric.  Neck/Throat: Moist mucous membranes. Normal voice.  Cardiac: Regular rhythm. Normal heart sounds.  Pulmonary: Clear and equal breath sounds bilaterally.   Musculoskeletal: Normal gross range of motion of all 4 extremities.   Neurological: Alert and oriented x4.   Skin: Warm and dry. 1.5 cm lac between left middle finger MCP and PIP dorsal surface left middle finger with normal sensation and range of motion.  Psych: Affect normal. Good eye contact.    Emergency Department Course     Procedures:     Laceration Repair      LACERATION:  A simple and superficial clean 1.5 cm laceration.    LOCATION:  Left middle finger.    FUNCTION:  Distally sensation, circulation, motor and tendon function are intact.    ANESTHESIA:  Local using 0.5% bupivacaine total of 1 mLs.    PREPARATION:  Scrubbing with Normal Saline and Shur Clens.    DEBRIDEMENT:  no debridement and wound explored, no foreign body found.    CLOSURE:  Wound was closed with One Layer.  Skin closed with 3 x 5.0 Ethylon using interrupted sutures..       Interventions:  Adacel 0.5 mL IM In process    Emergency Department Course:    1517 Nursing notes and vitals reviewed.    1519 I performed an exam of the patient as documented above.     1531 I performed the laceration repair procedure as documented above.    Findings and plan explained to the Patient. Patient discharged home with instructions regarding supportive care, medications, and reasons to return. The importance of close follow-up was reviewed.     Impression & Plan     Medical Decision Making:  Audra Hayden is a 84 year old female who presents with a laceration to her left middle finger. The wound was carefully evaluated and explored. The laceration was closed with 5.0 Ethylon as noted above. There is no evidence of " muscular, tendon, or bony damage with this laceration and no indication for xray. No signs of foreign body. TDAP updated. Possible complications (infection, scarring) were reviewed with the patient. Follow up with primary care in 7-10 days for suture removal.     Discharge Diagnosis:    ICD-10-CM    1. Finger laceration S61.219A      Disposition:  The patient is discharged to home.    Scribe Disclosure:  I, Craig Thompson, am serving as a scribe at 3:18 PM on 12/16/2019 to document services personally performed by Hedy Irizarry, RANULFO based on my observations and the provider's statements to me.        Hedy Irizarry, CNP  12/16/19 [id number]

## 2019-12-16 NOTE — ED AVS SNAPSHOT
Emergency Department  [address] [place] [address]  Phone:  [phone]  Fax:  [phone]                                    Audra Hayden   MRN: [id number]    Department:   Emergency Department   Date of Visit:  12/16/2019           After Visit Summary Signature Page    I have received my discharge instructions, and my questions have been answered. I have discussed any challenges I see with this plan with the nurse or doctor.    ..........................................................................................................................................  Patient/Patient Representative Signature      ..........................................................................................................................................  Patient Representative Print Name and Relationship to Patient    ..................................................               ................................................  Date                                   Time    ..........................................................................................................................................  Reviewed by Signature/Title    ...................................................              ..............................................  Date                                               Time          22EPIC Rev 08/18

## 2020-07-27 ENCOUNTER — HOSPITAL ENCOUNTER (OUTPATIENT)
Dept: MAMMOGRAPHY | Facility: CLINIC | Age: 85
Discharge: HOME | End: 2020-07-27
Attending: INTERNAL MEDICINE | Admitting: INTERNAL MEDICINE
Payer: COMMERCIAL

## 2020-07-27 DIAGNOSIS — Z12.31 VISIT FOR SCREENING MAMMOGRAM: ICD-10-CM

## 2020-07-27 DIAGNOSIS — Z12.31 SCREENING MAMMOGRAM, ENCOUNTER FOR: ICD-10-CM

## 2020-07-27 PROCEDURE — 77067 SCR MAMMO BI INCL CAD: CPT

## 2020-11-16 ENCOUNTER — INFUSION THERAPY VISIT (OUTPATIENT)
Dept: INFUSION THERAPY | Facility: CLINIC | Age: 85
End: 2020-11-16
Attending: INTERNAL MEDICINE
Payer: COMMERCIAL

## 2020-11-16 ENCOUNTER — HOSPITAL ENCOUNTER (OUTPATIENT)
Facility: CLINIC | Age: 85
Setting detail: SPECIMEN
Discharge: HOME | End: 2020-11-16
Attending: INTERNAL MEDICINE | Admitting: INTERNAL MEDICINE
Payer: COMMERCIAL

## 2020-11-16 DIAGNOSIS — Z85.3 PERSONAL HISTORY OF MALIGNANT NEOPLASM OF BREAST: ICD-10-CM

## 2020-11-16 LAB
ALBUMIN SERPL-MCNC: 4.2 G/DL (ref 3.4–5)
ALP SERPL-CCNC: 79 U/L (ref 40–150)
ALT SERPL W P-5'-P-CCNC: 18 U/L (ref 0–50)
ANION GAP SERPL CALCULATED.3IONS-SCNC: 5 MMOL/L (ref 3–14)
AST SERPL W P-5'-P-CCNC: 17 U/L (ref 0–45)
BILIRUB SERPL-MCNC: 0.8 MG/DL (ref 0.2–1.3)
BUN SERPL-MCNC: 17 MG/DL (ref 7–30)
CALCIUM SERPL-MCNC: 9.4 MG/DL (ref 8.5–10.1)
CANCER AG27-29 SERPL-ACNC: 29 U/ML (ref 0–39)
CHLORIDE SERPL-SCNC: 104 MMOL/L (ref 94–109)
CO2 SERPL-SCNC: 28 MMOL/L (ref 20–32)
CREAT SERPL-MCNC: 0.68 MG/DL (ref 0.52–1.04)
GFR SERPL CREATININE-BSD FRML MDRD: 80 ML/MIN/{1.73_M2}
GLUCOSE SERPL-MCNC: 94 MG/DL (ref 70–99)
POTASSIUM SERPL-SCNC: 3.7 MMOL/L (ref 3.4–5.3)
PROT SERPL-MCNC: 8.2 G/DL (ref 6.8–8.8)
SODIUM SERPL-SCNC: 137 MMOL/L (ref 133–144)

## 2020-11-16 PROCEDURE — 80053 COMPREHEN METABOLIC PANEL: CPT | Performed by: INTERNAL MEDICINE

## 2020-11-16 PROCEDURE — 36415 COLL VENOUS BLD VENIPUNCTURE: CPT

## 2020-11-16 PROCEDURE — 86300 IMMUNOASSAY TUMOR CA 15-3: CPT | Performed by: INTERNAL MEDICINE

## 2020-11-16 NOTE — PROGRESS NOTES
Medical Assistant Note:  Audra Hayden presents today for blood drw.    Patient seen by provider today: No.   present during visit today: Not Applicable.    Concerns: No Concerns.    Procedure:  Lab draw site: lac, Needle type: bf, Gauge: 23.    Post Assessment:  Labs drawn without difficulty: Yes.    Discharge Plan:  Departure Mode: Ambulatory.    Face to Face Time: 5 min  .    Loly Reid, CMA

## 2020-11-18 ENCOUNTER — ONCOLOGY VISIT (OUTPATIENT)
Dept: ONCOLOGY | Facility: CLINIC | Age: 85
End: 2020-11-18
Attending: INTERNAL MEDICINE
Payer: COMMERCIAL

## 2020-11-18 VITALS
HEART RATE: 66 BPM | TEMPERATURE: 97.7 F | DIASTOLIC BLOOD PRESSURE: 75 MMHG | BODY MASS INDEX: 21.22 KG/M2 | RESPIRATION RATE: 16 BRPM | WEIGHT: 123.6 LBS | OXYGEN SATURATION: 100 % | SYSTOLIC BLOOD PRESSURE: 145 MMHG

## 2020-11-18 DIAGNOSIS — M85.80 OSTEOPENIA, UNSPECIFIED LOCATION: ICD-10-CM

## 2020-11-18 DIAGNOSIS — Z12.31 SCREENING MAMMOGRAM, ENCOUNTER FOR: ICD-10-CM

## 2020-11-18 DIAGNOSIS — R19.7 DIARRHEA, UNSPECIFIED TYPE: ICD-10-CM

## 2020-11-18 DIAGNOSIS — Z85.3 PERSONAL HISTORY OF MALIGNANT NEOPLASM OF BREAST: Primary | ICD-10-CM

## 2020-11-18 PROCEDURE — G0463 HOSPITAL OUTPT CLINIC VISIT: HCPCS

## 2020-11-18 PROCEDURE — 99213 OFFICE O/P EST LOW 20 MIN: CPT | Performed by: INTERNAL MEDICINE

## 2020-11-18 RX ORDER — CHOLESTYRAMINE 4 G/9G
POWDER, FOR SUSPENSION ORAL
COMMUNITY
Start: 2020-09-03

## 2020-11-18 ASSESSMENT — PAIN SCALES - GENERAL: PAINLEVEL: NO PAIN (0)

## 2020-11-18 NOTE — PROGRESS NOTES
"Oncology Rooming Note    November 18, 2020 9:18 AM   Audra Hayden is a 85 year old female who presents for:    Chief Complaint   Patient presents with     Oncology Clinic Visit     Breast cancer (H)     Initial Vitals: BP (!) 155/74 (BP Location: Right arm, Patient Position: Sitting, Cuff Size: Adult Regular)   Pulse 66   Temp 97.7  F (36.5  C) (Oral)   Resp 16   Wt 56.1 kg (123 lb 9.6 oz)   SpO2 100%   BMI 21.22 kg/m   Estimated body mass index is 21.22 kg/m  as calculated from the following:    Height as of 12/16/19: 1.626 m (5' 4\").    Weight as of this encounter: 56.1 kg (123 lb 9.6 oz). Body surface area is 1.59 meters squared.  No Pain (0) Comment: Data Unavailable   No LMP recorded. Patient is postmenopausal.  Allergies reviewed: Yes  Medications reviewed: Yes    Medications: Medication refills not needed today.  Pharmacy name entered into [facility]:    [id number] - [place], [place] [facility] [address] [facility] [address] [place] [address]    Clinical concerns: no     Shirin Martinez CMA              "

## 2020-11-18 NOTE — LETTER
"    11/18/2020         RE: Audra Hayden  [address] [facility] [address]        Dear Colleague,    Thank you for referring your patient, Audra Hayden, to the [facility]. Please see a copy of my visit note below.    Oncology Rooming Note    November 18, 2020 9:18 AM   Audra Hayden is a 85 year old female who presents for:    Chief Complaint   Patient presents with     Oncology Clinic Visit     Breast cancer (H)     Initial Vitals: BP (!) 155/74 (BP Location: Right arm, Patient Position: Sitting, Cuff Size: Adult Regular)   Pulse 66   Temp 97.7  F (36.5  C) (Oral)   Resp 16   Wt 56.1 kg (123 lb 9.6 oz)   SpO2 100%   BMI 21.22 kg/m   Estimated body mass index is 21.22 kg/m  as calculated from the following:    Height as of 12/16/19: 1.626 m (5' 4\").    Weight as of this encounter: 56.1 kg (123 lb 9.6 oz). Body surface area is 1.59 meters squared.  No Pain (0) Comment: Data Unavailable   No LMP recorded. Patient is postmenopausal.  Allergies reviewed: Yes  Medications reviewed: Yes    Medications: Medication refills not needed today.  Pharmacy name entered into [facility]:    [facility] #[id number] - [place], [place] - [address] [facility], [place] - [address] [place] [address]    Clinical concerns: no     Shiirn Martinez, GUY                Hematology/Oncology Follow-up Visit:     REASON FOR VISIT/CC: Right-sided breast cancer 2012 T1cN0 s/p lumpectomy, RT, s/p 5 yrs of anti hormone    HISTORY OF ONCOLOGY ILLNESS: She was diagnosed in 6/2012 at age 77 via MA right breast cancer which revealed a mass of 1.1 cm. Biopsy done on 06/28/2012 revealed an ER/HI positive and grade 2 invasive ductal carcinoma. She then had on 07/13/2012 a right-sided lumpectomy and sentinel lymph node biopsy and pathology revealed an invasive ductal carcinoma, 1.3 cm, grade 2, margins negative, negative sentinel lymph nodes. Staging was pT1c pN0. HER2/maya by FISH was negative. Oncotype RS was " 17, low risk.   She had Radiation done on 10/1/12. She started Femara on 10/08/2012 and switched to Arimidex on 8/22/13 due to bony pain, then changed to tamoxifen end of April 2014 till 10/2017.   She finished total 5 yrs of anti hormone therapy in 11/2017 at age 82 due to age, endometrium polyps/ thickening and poor tolerance of AI.  She had hysterectomy, oohprectomy and salphingoophrectomy 8/2018.     INTERVAL HISTORY:  She is battling with chronic diarrhea and had colonoscopy with negative findings.   She is seeing Derm for skin cancer.    PAST MEDICAL HISTORY/ALLERGIE/MEDICATIONS: reviewed    SOCIAL HISTORY/FAMILY HISTORY: reviewed    REVIEW OF SYSTEMS:   Energy levels is fair. She is very energetic. Her diarrhea is better.     PHYSICAL EXAMINATION:   VITAL SIGNS: Blood pressure (!) 145/75, pulse 66, temperature 97.7  F (36.5  C), temperature source Oral, resp. rate 16, weight 56.1 kg (123 lb 9.6 oz), SpO2 100 %.  HEENT: Head is normocephalic, atraumatic. Eyes: PERRLA, EOMI, no icterus or pallor noted. Oropharyngeal examination was clear.   NECK: Supple, no masses felt in the bilateral cervical or supraclavicular area.   LUNGS: Clear, no wheezing or ronchi  CARDIOVASCULAR: Rate and rhythm was regular, no murmur or gallop  ABDOMEN: Soft, nontender, nondistended, no organomegaly.   BREASTS:. Bilateral axillae negative for lymphadenopathy. [place] lymph node biopsy site healing well. Right breast changes from a lumpectomy. Mild tenderness, no bruising, scar well-healed. No lumps or bumps felt in either breast:   ABDOMEN: Soft, nontender, nondistended, no organomegaly. Bowel sounds heard and normal.   EXTREMITIES: Warm and no edema, no sign of cellulitis  NEUROLOGIC: sensation intact, muscle strength and tone symmetrical all through    Current LAB Data Reviewed today  CMP,  marker is fine.       CURRENT IMAGE REVIEWED TODAY  7/2020 MA: NEGATIVE    Old data review and summary  11/2016 dexa: slight improved  osteopenia.   Bone Scan 8/28/13  1. Degenerative change in the lower lumbar spine and right wrist.   2. Small focus of increased activity left. Alignment posterior frontal calvarium with possible plain film correlation showing a 1 cm sclerotic focus. This is of uncertain significance, but likelihood of osseous metastasis is considered very low - MM work up were negative.   Mammogram 7/29/13 - The breast parenchyma is heterogeneously dense. Post lumpectomy scarring in the upper outer right breast. There is mild skin thickening in the right breast which has slightly decreased. The   left breast is unchanged. No mammographic finding of suspicion.     Dexa noted (outside report-2/3/14)- Osteoporosis (had osteopenia on 2013 Dexa).     A/p  1. [id number] right breast Ca 2012 s/p lumpectomy, RT, RS 17, s/p femera, then arimidex, then tamoxifen due to bone pain.  She finished total 5 yrs of anti hormone therapy in 11/2017 at age 82 due to age, endometrium polyps/ thickening and poor tolerance of AI.  She is due 12 months f/u with labs. MA is due July.  She wants to continue seeing us.     2. Osteoporosis/osteopenia, h/o falls and dizziness and balance issues.Started Prolia 60 mg sc q 6 months. Total 6 doses till fall 2016.   We talked about the proper vit D intake.   She is advised on proper dose of vit D 2000 IU per day.      3. Chronic diarrhea had negative work up with GI.   She is advised on K rich diet.         Again, thank you for allowing me to participate in the care of your patient.        Sincerely,        Eloina Gross MD, MD

## 2020-11-18 NOTE — PROGRESS NOTES
Hematology/Oncology Follow-up Visit:     REASON FOR VISIT/CC: Right-sided breast cancer 2012 T1cN0 s/p lumpectomy, RT, s/p 5 yrs of anti hormone    HISTORY OF ONCOLOGY ILLNESS: She was diagnosed in 6/2012 at age 77 via MA right breast cancer which revealed a mass of 1.1 cm. Biopsy done on 06/28/2012 revealed an ER/GA positive and grade 2 invasive ductal carcinoma. She then had on 07/13/2012 a right-sided lumpectomy and sentinel lymph node biopsy and pathology revealed an invasive ductal carcinoma, 1.3 cm, grade 2, margins negative, negative sentinel lymph nodes. Staging was pT1c pN0. HER2/maya by FISH was negative. Oncotype RS was 17, low risk.   She had Radiation done on 10/1/12. She started Femara on 10/08/2012 and switched to Arimidex on 8/22/13 due to bony pain, then changed to tamoxifen end of April 2014 till 10/2017.   She finished total 5 yrs of anti hormone therapy in 11/2017 at age 82 due to age, endometrium polyps/ thickening and poor tolerance of AI.  She had hysterectomy, oohprectomy and salphingoophrectomy 8/2018.     INTERVAL HISTORY:  She is battling with chronic diarrhea and had colonoscopy with negative findings.   She is seeing Derm for skin cancer.    PAST MEDICAL HISTORY/ALLERGIE/MEDICATIONS: reviewed    SOCIAL HISTORY/FAMILY HISTORY: reviewed    REVIEW OF SYSTEMS:   Energy levels is fair. She is very energetic. Her diarrhea is better.     PHYSICAL EXAMINATION:   VITAL SIGNS: Blood pressure (!) 145/75, pulse 66, temperature 97.7  F (36.5  C), temperature source Oral, resp. rate 16, weight 56.1 kg (123 lb 9.6 oz), SpO2 100 %.  HEENT: Head is normocephalic, atraumatic. Eyes: PERRLA, EOMI, no icterus or pallor noted. Oropharyngeal examination was clear.   NECK: Supple, no masses felt in the bilateral cervical or supraclavicular area.   LUNGS: Clear, no wheezing or ronchi  CARDIOVASCULAR: Rate and rhythm was regular, no murmur or gallop  ABDOMEN: Soft, nontender, nondistended, no organomegaly.    BREASTS:. Bilateral axillae negative for lymphadenopathy. [place] lymph node biopsy site healing well. Right breast changes from a lumpectomy. Mild tenderness, no bruising, scar well-healed. No lumps or bumps felt in either breast:   ABDOMEN: Soft, nontender, nondistended, no organomegaly. Bowel sounds heard and normal.   EXTREMITIES: Warm and no edema, no sign of cellulitis  NEUROLOGIC: sensation intact, muscle strength and tone symmetrical all through    Current LAB Data Reviewed today  CMP,  marker is fine.       CURRENT IMAGE REVIEWED TODAY  7/2020 MA: NEGATIVE    Old data review and summary  11/2016 dexa: slight improved osteopenia.   Bone Scan 8/28/13  1. Degenerative change in the lower lumbar spine and right wrist.   2. Small focus of increased activity left. Alignment posterior frontal calvarium with possible plain film correlation showing a 1 cm sclerotic focus. This is of uncertain significance, but likelihood of osseous metastasis is considered very low - MM work up were negative.   Mammogram 7/29/13 - The breast parenchyma is heterogeneously dense. Post lumpectomy scarring in the upper outer right breast. There is mild skin thickening in the right breast which has slightly decreased. The   left breast is unchanged. No mammographic finding of suspicion.     Dexa noted (outside report-2/3/14)- Osteoporosis (had osteopenia on 2013 Dexa).     A/p  1. [id number] right breast Ca 2012 s/p lumpectomy, RT, RS 17, s/p femera, then arimidex, then tamoxifen due to bone pain.  She finished total 5 yrs of anti hormone therapy in 11/2017 at age 82 due to age, endometrium polyps/ thickening and poor tolerance of AI.  She is due 12 months f/u with labs. MA is due July.  She wants to continue seeing us.     2. Osteoporosis/osteopenia, h/o falls and dizziness and balance issues.Started Prolia 60 mg sc q 6 months. Total 6 doses till fall 2016.   We talked about the proper vit D intake.   She is advised on proper dose of  vit D 2000 IU per day.      3. Chronic diarrhea had negative work up with GI.   She is advised on K rich diet.

## 2021-05-15 ENCOUNTER — TELEPHONE (OUTPATIENT)
Dept: ONCOLOGY | Facility: CLINIC | Age: 86
End: 2021-05-15

## 2021-05-15 NOTE — TELEPHONE ENCOUNTER
Deferred order to 5/26/21. Due MA in July 2021. Then follow up and labs near 11/18/21.  Dr. Gross patient who needs to transfer care to another provider at [facility].

## 2021-07-28 ENCOUNTER — HOSPITAL ENCOUNTER (OUTPATIENT)
Dept: MAMMOGRAPHY | Facility: CLINIC | Age: 86
Discharge: HOME | End: 2021-07-28
Attending: INTERNAL MEDICINE | Admitting: INTERNAL MEDICINE
Payer: COMMERCIAL

## 2021-07-28 DIAGNOSIS — Z12.31 SCREENING MAMMOGRAM, ENCOUNTER FOR: ICD-10-CM

## 2021-07-28 PROCEDURE — 77063 BREAST TOMOSYNTHESIS BI: CPT

## 2021-11-03 NOTE — PROGRESS NOTES
[facility]    Hematology/Oncology Established Patient Follow-up Note      Today's Date: 11/10/21    Reason for Follow-up: Right breast cancer. Transfer of care.    HISTORY OF PRESENT ILLNESS: Audra Hayden is a 86 year old female who presents with the following oncologic history:  --6/2012: Diagnosed at age 77 with right breast cancer -- 1.1 cm on imaging.  --6/28/2012: Biopsy of right breast mass showed an ER/OH positive grade 2 invasive ductal carcinoma.   --7/13/2012: Undewent right lumpectomy and sentinel lymph node biopsy. Pathology showed an invasive ductal carcinoma, 1.3 cm, grade 2, margins negative, negative sentinel lymph nodes. Staging was pT1c pN0. HER2/maya by FISH was negative. Oncotype RS = 17, low risk.   --10/1/2012: Completed adjuvant radiation therapy to the right breast.  --10/8/2012: Started letrozole.  --8/22/2012: Switched to anastrazole due to bony pain.  --4/2014: Switched to tamoxifen.  --11/2017: Completion of hormone blockade therapy due to age, endometrial polyps/thickening.  --8/2018: Underwent hysterectomy, oohprectomy and salphingoophrectomy.    INTERIM HISTORY:  Audra Hayden reports feeling well with no breast complaints and no new pain. She does report occasional urinary tract infections that were difficult to clear up.      REVIEW OF SYSTEMS:   14 point ROS was reviewed and is negative other than as noted above in HPI.       HOME MEDICATIONS:  Current Outpatient Medications   Medication Sig Dispense Refill     Calcium Carbonate-Vitamin D (CALCIUM + D PO) Take 1 capsule by mouth daily        Cholecalciferol (VITAMIN D) 1000 UNITS capsule Take 1,000 Units by mouth daily        cholestyramine (QUESTRAN) 4 g packet Take 1 (one) Packet twice daily as instructed       MAGNESIUM OXIDE PO Take 2 tablets by mouth daily as needed for leg cramps        MECLIZINE HCL PO Take 25 mg by mouth 3 times daily as needed for dizziness        Naproxen Sodium (ALEVE PO) Take 220 mg by  mouth 2 times daily as needed for moderate pain For headaches        Potassium Chloride Chantell ER (K-DUR PO) Take 20 mEq by mouth 2 times daily       triamterene-hydrochlorothiazide (DYAZIDE) 37.5-25 MG per capsule TAKE 1 CAPSULE BY MOUTH DAILY. 90 capsule 0     verapamil (VERELAN) 240 MG CP24 TAKE 1 CAPSULE (240 MG) BY ORAL ROUTE ONCE DAILY 90 capsule 0         ALLERGIES:  Allergies   Allergen Reactions     Contrast Dye Hives and Swelling     Throat swelling     Other [No Clinical Screening - See Comments]      Pt has trouble with some anesthetics - not sure which ones     Zofran [Ondansetron Hcl-Dextrose]      Tremors, involuntary movements, diptonia     Sulfa Drugs Rash         PAST MEDICAL HISTORY:  Past Medical History:   Diagnosis Date     Breast cancer (H) 2012    lumpectomy plus radiation     Hyperlipidemia LDL goal < 130      Hypertension goal BP (blood pressure) < 140/90      Hypokalemia      Ophthalmic migraine      Osteoporosis      PONV (postoperative nausea and vomiting)      Skin cancer      Thickened endometrium          PAST SURGICAL HISTORY:  Past Surgical History:   Procedure Laterality Date     APPENDECTOMY       ARTHROSCOPY KNEE RT/LT       BASAL CELL       BIOPSY BREAST NEEDLE LOCALIZATION, BIOPSY NODE SENTINEL, COMBINED  7/13/2012    Procedure: COMBINED BIOPSY BREAST NEEDLE LOCALIZATION, BIOPSY NODE SENTINEL;  RIGHT BREAST LUMPECTOMY WITH SENTINEL NODE BIOPSY, WIRE LOCALIZATION;  Surgeon: Benji Schwab MD;  Location: [facility]     COLONOSCOPY       CT TEMPOROMANDIBULAR JOINTS       CYSTOSCOPY N/A 8/30/2018    Procedure: CYSTOSCOPY;;  Surgeon: Clary Hernandez MD;  Location: [facility]     DILATION AND CURETTAGE, OPERATIVE HYSTEROSCOPY WITH MORCELLATOR, COMBINED N/A 2/17/2017    Procedure: COMBINED DILATION AND CURETTAGE, OPERATIVE HYSTEROSCOPY WITH MORCELLATOR;  Surgeon: Clary Hernandez MD;  Location: [facility]     DISCECTOMY LUMBAR ANTERIOR       FOOT SURGERY      LEFT     HC BLEPHAROPLASTY UPPER  LID W EXCESS SKIN       LAPAROSCOPIC ASSISTED HYSTERECTOMY VAGINAL, BILATERAL SALPINGO-OOPHORECTOMY, COMBINED Bilateral 8/30/2018    Procedure: COMBINED LAPAROSCOPIC ASSISTED HYSTERECTOMY VAGINAL, SALPINGO-OOPHORECTOMY;  COMBINED LAPAROSCOPIC ASSISTED HYSTERECTOMY VAGINAL, BILATERAL SALPINGO-OOPHORECTOMY ; PELVIC WASHINGS CYSTO;  Surgeon: Clary Hernandez MD;  Location: [facility]     LUMPECTOMY BREAST  7/2012    brest cancer     MOHS MICROGRAPHIC PROCEDURE       OPERATIVE HYSTEROSCOPY WITH MORCELLATOR N/A 4/16/2015    Procedure: OPERATIVE HYSTEROSCOPY WITH MORCELLATOR (MYOSURE);  Surgeon: Aziza Sanchez MD;  Location: [facility]     SINUS SURGERY           SOCIAL HISTORY:  Social History     Socioeconomic History     Marital status:      Spouse name: Not on file     Number of children: Not on file     Years of education: Not on file     Highest education level: Not on file   Occupational History     Not on file   Tobacco Use     Smoking status: Never Smoker     Smokeless tobacco: Never Used   Substance and Sexual Activity     Alcohol use: No     Drug use: No     Sexual activity: Not Currently     Partners: Male   Other Topics Concern     Parent/sibling w/ CABG, MI or angioplasty before 65F 55M? Not Asked   Social History Narrative     Not on file     Social Determinants of Health     Financial Resource Strain:      Difficulty of Paying Living Expenses:    Food Insecurity:      Worried About Running Out of Food in the Last Year:      Ran Out of Food in the Last Year:    Transportation Needs:      Lack of Transportation (Medical):      Lack of Transportation (Non-Medical):    Physical Activity:      Days of Exercise per Week:      Minutes of Exercise per Session:    Stress:      Feeling of Stress :    Social Connections:      Frequency of Communication with Friends and Family:      Frequency of Social Gatherings with Friends and Family:      Attends Restoration[employer] Services:      Active Member of Clubs or  "Organizations:      Attends Club or Organization Meetings:      Marital Status:    Intimate Partner Violence:      Fear of Current or Ex-Partner:      Emotionally Abused:      Physically Abused:      Sexually Abused:          FAMILY HISTORY:  No family history on file.      PHYSICAL EXAM:  Vital signs:  /58   Pulse 73   Temp 98.5  F (36.9  C)   Resp 16   Ht 1.626 m (5' 4\")   Wt 58.8 kg (129 lb 9.6 oz)   SpO2 97%   BMI 22.25 kg/m     GENERAL/CONSTITUTIONAL: No acute distress.  EYES: No erythema or scleral icterus.  LYMPH: No cervical, supraclavicular, axillary adenopathy.   BREAST: No palpable masses in either breast with exception of lumpectomy scar tissue at right outer breast. Nipples are everted bilaterally with no discharge. No erythema, ulceration, or dimpling of the skin.  RESPIRATORY: No audible cough or wheezing.   GASTROINTESTINAL: No hepatosplenomegaly, masses, or tenderness. No guarding.  No distention.  MUSCULOSKELETAL: Warm and well-perfused, no cyanosis, clubbing, or edema.  NEUROLOGIC: Alert, oriented, answers questions appropriately.  INTEGUMENTARY: No rashes or jaundice.  GAIT: Steady, does not use assistive device      LABS:  CBC RESULTS: Recent Labs   Lab Test 11/08/21  0857   WBC 5.3   RBC 4.48   HGB 13.1   HCT 40.8   MCV 91   MCH 29.2   MCHC 32.1   RDW 14.2        Last Comprehensive Metabolic Panel:  Sodium   Date Value Ref Range Status   11/08/2021 140 133 - 144 mmol/L Final   11/16/2020 137 133 - 144 mmol/L Final     Potassium   Date Value Ref Range Status   11/08/2021 4.1 3.4 - 5.3 mmol/L Final   11/16/2020 3.7 3.4 - 5.3 mmol/L Final     Chloride   Date Value Ref Range Status   11/08/2021 106 94 - 109 mmol/L Final   11/16/2020 104 94 - 109 mmol/L Final     Carbon Dioxide   Date Value Ref Range Status   11/16/2020 28 20 - 32 mmol/L Final     Carbon Dioxide (CO2)   Date Value Ref Range Status   11/08/2021 31 20 - 32 mmol/L Final     Anion Gap   Date Value Ref Range Status "   2021 3 3 - 14 mmol/L Final   2020 5 3 - 14 mmol/L Final     Glucose   Date Value Ref Range Status   2021 89 70 - 99 mg/dL Final   2020 94 70 - 99 mg/dL Final     Urea Nitrogen   Date Value Ref Range Status   2021 16 7 - 30 mg/dL Final   2020 17 7 - 30 mg/dL Final     Creatinine   Date Value Ref Range Status   2021 0.60 0.52 - 1.04 mg/dL Final   2020 0.68 0.52 - 1.04 mg/dL Final     GFR Estimate   Date Value Ref Range Status   2021 83 >60 mL/min/1.73m2 Final     Comment:     As of 2021, eGFR is calculated by the CKD-EPI creatinine equation, without race adjustment. eGFR can be influenced by muscle mass, exercise, and diet. The reported eGFR is an estimation only and is only applicable if the renal function is stable.   2020 80 >60 mL/min/[1.73_m2] Final     Comment:     Non  GFR Calc  Starting 2018, serum creatinine based estimated GFR (eGFR) will be   calculated using the Chronic Kidney Disease Epidemiology Collaboration   (CKD-EPI) equation.       Calcium   Date Value Ref Range Status   2021 9.5 8.5 - 10.1 mg/dL Final   2020 9.4 8.5 - 10.1 mg/dL Final     Bilirubin Total   Date Value Ref Range Status   2021 0.6 0.2 - 1.3 mg/dL Final   2020 0.8 0.2 - 1.3 mg/dL Final     Alkaline Phosphatase   Date Value Ref Range Status   2021 75 40 - 150 U/L Final   2020 79 40 - 150 U/L Final     ALT   Date Value Ref Range Status   2021 20 0 - 50 U/L Final   2020 18 0 - 50 U/L Final     AST   Date Value Ref Range Status   2021 24 0 - 45 U/L Final   2020 17 0 - 45 U/L Final     CA 27-29 = 19    PATHOLOGY:  Reviewed as per HPI.    IMAGIN2021: Mammogram showed no suspicious findings.    ASSESSMENT/PLAN:  Audra Hayden is a 86 year old female with the following issues:  1. Stage IA, [id number], grade 2 invasive ductal carcinoma of right breast, ER/MS positive, HER-2/maya FISH  negative, Oncotype DX  = 17  --Audra was diagnosed in 2012 and is s/p right lumpectomy, radiation, and 5 years of hormone blockade therapy, completed in 11/2017.  --Discussed with Audra that she has no clinical evidence for recurrent breast cancer by physical exam or mammogram reviewed from 7/28/2021.  --Continue annual mammograms.  --Discussed that as per NCCN and ASCO guidelines, that routine labs are not recommended as they have not been shown to improve overall survival in breast cancer surveillance.  I would only recommend lab and/or imaging workup as clinically indicated.    2. Osteopenia  --Seen on 11/10/2016 DEXA scan. I advised she see her primary care provider for repeat DEXA scan.    3. Vaginal atrophy  4. Intermittent urinary tract infections  --I advised use of vaginal estrogen cream -- apply pea size amount once daily for 2 weeks followed by twice weekly maintenance. Discussed that beneficial changes may not be felt for at least 4-6 weeks.  --Discussed that as per ACOG, use of the minimal amount of vaginal estrogen cream does not substantially increase the risk of breast cancer recurrence and as such, can be utilized in patients who have had breast cancer.    Return in 1 year.    Gladys Asif MD  Hematology/Oncology  [facility]    Total time spent: 30 minutes in patient evaluation, counseling, documentation, and coordination of care.

## 2021-11-08 ENCOUNTER — LAB (OUTPATIENT)
Dept: INFUSION THERAPY | Facility: CLINIC | Age: 86
End: 2021-11-08
Attending: INTERNAL MEDICINE
Payer: COMMERCIAL

## 2021-11-08 DIAGNOSIS — C50.919 BREAST CA (H): Primary | ICD-10-CM

## 2021-11-08 LAB
ALBUMIN SERPL-MCNC: 4 G/DL (ref 3.4–5)
ALP SERPL-CCNC: 75 U/L (ref 40–150)
ALT SERPL W P-5'-P-CCNC: 20 U/L (ref 0–50)
ANION GAP SERPL CALCULATED.3IONS-SCNC: 3 MMOL/L (ref 3–14)
AST SERPL W P-5'-P-CCNC: 24 U/L (ref 0–45)
BASOPHILS # BLD AUTO: 0 10E3/UL (ref 0–0.2)
BASOPHILS NFR BLD AUTO: 1 %
BILIRUB SERPL-MCNC: 0.6 MG/DL (ref 0.2–1.3)
BUN SERPL-MCNC: 16 MG/DL (ref 7–30)
CALCIUM SERPL-MCNC: 9.5 MG/DL (ref 8.5–10.1)
CANCER AG27-29 SERPL-ACNC: 19 U/ML (ref 0–39)
CHLORIDE BLD-SCNC: 106 MMOL/L (ref 94–109)
CO2 SERPL-SCNC: 31 MMOL/L (ref 20–32)
CREAT SERPL-MCNC: 0.6 MG/DL (ref 0.52–1.04)
EOSINOPHIL # BLD AUTO: 0.1 10E3/UL (ref 0–0.7)
EOSINOPHIL NFR BLD AUTO: 1 %
ERYTHROCYTE [DISTWIDTH] IN BLOOD BY AUTOMATED COUNT: 14.2 % (ref 10–15)
GFR SERPL CREATININE-BSD FRML MDRD: 83 ML/MIN/1.73M2
GLUCOSE BLD-MCNC: 89 MG/DL (ref 70–99)
HCT VFR BLD AUTO: 40.8 % (ref 35–47)
HGB BLD-MCNC: 13.1 G/DL (ref 11.7–15.7)
IMM GRANULOCYTES # BLD: 0 10E3/UL
IMM GRANULOCYTES NFR BLD: 0 %
LYMPHOCYTES # BLD AUTO: 2.3 10E3/UL (ref 0.8–5.3)
LYMPHOCYTES NFR BLD AUTO: 44 %
MCH RBC QN AUTO: 29.2 PG (ref 26.5–33)
MCHC RBC AUTO-ENTMCNC: 32.1 G/DL (ref 31.5–36.5)
MCV RBC AUTO: 91 FL (ref 78–100)
MONOCYTES # BLD AUTO: 0.5 10E3/UL (ref 0–1.3)
MONOCYTES NFR BLD AUTO: 10 %
NEUTROPHILS # BLD AUTO: 2.3 10E3/UL (ref 1.6–8.3)
NEUTROPHILS NFR BLD AUTO: 44 %
NRBC # BLD AUTO: 0 10E3/UL
NRBC BLD AUTO-RTO: 0 /100
PLATELET # BLD AUTO: 176 10E3/UL (ref 150–450)
POTASSIUM BLD-SCNC: 4.1 MMOL/L (ref 3.4–5.3)
PROT SERPL-MCNC: 8 G/DL (ref 6.8–8.8)
RBC # BLD AUTO: 4.48 10E6/UL (ref 3.8–5.2)
SODIUM SERPL-SCNC: 140 MMOL/L (ref 133–144)
WBC # BLD AUTO: 5.3 10E3/UL (ref 4–11)

## 2021-11-08 PROCEDURE — 85004 AUTOMATED DIFF WBC COUNT: CPT | Performed by: INTERNAL MEDICINE

## 2021-11-08 PROCEDURE — 36415 COLL VENOUS BLD VENIPUNCTURE: CPT | Performed by: INTERNAL MEDICINE

## 2021-11-08 PROCEDURE — 86300 IMMUNOASSAY TUMOR CA 15-3: CPT | Performed by: INTERNAL MEDICINE

## 2021-11-08 PROCEDURE — 80053 COMPREHEN METABOLIC PANEL: CPT | Performed by: INTERNAL MEDICINE

## 2021-11-08 NOTE — PROGRESS NOTES
Medical Assistant Note:  Audra Hayden presents today for lab draw.    Patient seen by provider today: No.   present during visit today: Not Applicable.    Concerns: No Concerns.    Procedure:  Lab draw site: RAC, Needle type: BF, Gauge: 21. Gauze and coban applied    Post Assessment:  Labs drawn without difficulty: Yes.    Discharge Plan:  Departure Mode: Ambulatory.    Face to Face Time: 5.    Shirin Martinez CMA

## 2021-11-10 ENCOUNTER — ONCOLOGY VISIT (OUTPATIENT)
Dept: ONCOLOGY | Facility: CLINIC | Age: 86
End: 2021-11-10
Attending: INTERNAL MEDICINE
Payer: COMMERCIAL

## 2021-11-10 VITALS
BODY MASS INDEX: 22.13 KG/M2 | HEIGHT: 64 IN | SYSTOLIC BLOOD PRESSURE: 124 MMHG | DIASTOLIC BLOOD PRESSURE: 58 MMHG | HEART RATE: 73 BPM | WEIGHT: 129.6 LBS | OXYGEN SATURATION: 97 % | RESPIRATION RATE: 16 BRPM | TEMPERATURE: 98.5 F

## 2021-11-10 DIAGNOSIS — N95.2 VAGINAL ATROPHY: ICD-10-CM

## 2021-11-10 DIAGNOSIS — Z85.3 PERSONAL HISTORY OF MALIGNANT NEOPLASM OF BREAST: Primary | ICD-10-CM

## 2021-11-10 DIAGNOSIS — Z12.31 ENCOUNTER FOR SCREENING MAMMOGRAM FOR BREAST CANCER: ICD-10-CM

## 2021-11-10 PROCEDURE — 99214 OFFICE O/P EST MOD 30 MIN: CPT | Performed by: INTERNAL MEDICINE

## 2021-11-10 RX ORDER — ESTRADIOL 0.1 MG/G
2 CREAM VAGINAL
Qty: 42.5 G | Refills: 3 | Status: SHIPPED | OUTPATIENT
Start: 2021-11-11

## 2021-11-10 ASSESSMENT — MIFFLIN-ST. JEOR: SCORE: 1017.86

## 2021-11-10 ASSESSMENT — PAIN SCALES - GENERAL: PAINLEVEL: NO PAIN (0)

## 2021-11-10 NOTE — PROGRESS NOTES
"Oncology Rooming Note    November 10, 2021 9:04 AM   Audra Hayden is a 86 year old female who presents for:    Chief Complaint   Patient presents with     Oncology Clinic Visit     Initial Vitals: There were no vitals taken for this visit. Estimated body mass index is 21.22 kg/m  as calculated from the following:    Height as of 12/16/19: 1.626 m (5' 4\").    Weight as of 11/18/20: 56.1 kg (123 lb 9.6 oz). There is no height or weight on file to calculate BSA.  Data Unavailable Comment: Data Unavailable   No LMP recorded. Patient is postmenopausal.  Allergies reviewed: Yes  Medications reviewed: Yes    Medications: Medication refills not needed today.  Pharmacy name entered into sfilatino:    [id number] - [place], [place] - [address] [facility] [place] [place] [address] [place] [id number]    Clinical concerns:  doctor was notified.      Cristel Gutierrez MA            "

## 2021-11-10 NOTE — LETTER
11/10/2021         RE: Audra Hayden  [address] [facility] [facility] [address]        Dear Colleague,    Thank you for referring your patient, Audra Hayden, to the [facility]. Please see a copy of my visit note below.    [facility]    Hematology/Oncology Established Patient Follow-up Note      Today's Date: 11/10/21    Reason for Follow-up: Right breast cancer. Transfer of care.    HISTORY OF PRESENT ILLNESS: Audra Hayden is a 86 year old female who presents with the following oncologic history:  --6/2012: Diagnosed at age 77 with right breast cancer -- 1.1 cm on imaging.  --6/28/2012: Biopsy of right breast mass showed an ER/DE positive grade 2 invasive ductal carcinoma.   --7/13/2012: Undewent right lumpectomy and sentinel lymph node biopsy. Pathology showed an invasive ductal carcinoma, 1.3 cm, grade 2, margins negative, negative sentinel lymph nodes. Staging was pT1c pN0. HER2/maya by FISH was negative. Oncotype RS = 17, low risk.   --10/1/2012: Completed adjuvant radiation therapy to the right breast.  --10/8/2012: Started letrozole.  --8/22/2012: Switched to anastrazole due to bony pain.  --4/2014: Switched to tamoxifen.  --11/2017: Completion of hormone blockade therapy due to age, endometrial polyps/thickening.  --8/2018: Underwent hysterectomy, oohprectomy and salphingoophrectomy.    INTERIM HISTORY:  Audra Hayden reports feeling well with no breast complaints and no new pain. She does report occasional urinary tract infections that were difficult to clear up.      REVIEW OF SYSTEMS:   14 point ROS was reviewed and is negative other than as noted above in HPI.       HOME MEDICATIONS:  Current Outpatient Medications   Medication Sig Dispense Refill     Calcium Carbonate-Vitamin D (CALCIUM + D PO) Take 1 capsule by mouth daily        Cholecalciferol (VITAMIN D) 1000 UNITS capsule Take 1,000 Units by mouth daily        cholestyramine (QUESTRAN) 4 g packet  Take 1 (one) Packet twice daily as instructed       MAGNESIUM OXIDE PO Take 2 tablets by mouth daily as needed for leg cramps        MECLIZINE HCL PO Take 25 mg by mouth 3 times daily as needed for dizziness        Naproxen Sodium (ALEVE PO) Take 220 mg by mouth 2 times daily as needed for moderate pain For headaches        Potassium Chloride Chantell ER (K-DUR PO) Take 20 mEq by mouth 2 times daily       triamterene-hydrochlorothiazide (DYAZIDE) 37.5-25 MG per capsule TAKE 1 CAPSULE BY MOUTH DAILY. 90 capsule 0     verapamil (VERELAN) 240 MG CP24 TAKE 1 CAPSULE (240 MG) BY ORAL ROUTE ONCE DAILY 90 capsule 0         ALLERGIES:  Allergies   Allergen Reactions     Contrast Dye Hives and Swelling     Throat swelling     Other [No Clinical Screening - See Comments]      Pt has trouble with some anesthetics - not sure which ones     Zofran [Ondansetron Hcl-Dextrose]      Tremors, involuntary movements, diptonia     Sulfa Drugs Rash         PAST MEDICAL HISTORY:  Past Medical History:   Diagnosis Date     Breast cancer (H) 2012    lumpectomy plus radiation     Hyperlipidemia LDL goal < 130      Hypertension goal BP (blood pressure) < 140/90      Hypokalemia      Ophthalmic migraine      Osteoporosis      PONV (postoperative nausea and vomiting)      Skin cancer      Thickened endometrium          PAST SURGICAL HISTORY:  Past Surgical History:   Procedure Laterality Date     APPENDECTOMY       ARTHROSCOPY KNEE RT/LT       BASAL CELL       BIOPSY BREAST NEEDLE LOCALIZATION, BIOPSY NODE SENTINEL, COMBINED  7/13/2012    Procedure: COMBINED BIOPSY BREAST NEEDLE LOCALIZATION, BIOPSY NODE SENTINEL;  RIGHT BREAST LUMPECTOMY WITH SENTINEL NODE BIOPSY, WIRE LOCALIZATION;  Surgeon: Benji Schwab MD;  Location: [facility]     COLONOSCOPY       CT TEMPOROMANDIBULAR JOINTS       CYSTOSCOPY N/A 8/30/2018    Procedure: CYSTOSCOPY;;  Surgeon: Clary Hernandez MD;  Location: [facility]     DILATION AND CURETTAGE, OPERATIVE HYSTEROSCOPY WITH  MORCELLATOR, COMBINED N/A 2/17/2017    Procedure: COMBINED DILATION AND CURETTAGE, OPERATIVE HYSTEROSCOPY WITH MORCELLATOR;  Surgeon: Clary Hernandez MD;  Location: [facility]     DISCECTOMY LUMBAR ANTERIOR       FOOT SURGERY      LEFT     HC BLEPHAROPLASTY UPPER LID W EXCESS SKIN       LAPAROSCOPIC ASSISTED HYSTERECTOMY VAGINAL, BILATERAL SALPINGO-OOPHORECTOMY, COMBINED Bilateral 8/30/2018    Procedure: COMBINED LAPAROSCOPIC ASSISTED HYSTERECTOMY VAGINAL, SALPINGO-OOPHORECTOMY;  COMBINED LAPAROSCOPIC ASSISTED HYSTERECTOMY VAGINAL, BILATERAL SALPINGO-OOPHORECTOMY ; PELVIC WASHINGS CYSTO;  Surgeon: Clary Hernandez MD;  Location: [facility]     LUMPECTOMY BREAST  7/2012    brest cancer     MOHS MICROGRAPHIC PROCEDURE       OPERATIVE HYSTEROSCOPY WITH MORCELLATOR N/A 4/16/2015    Procedure: OPERATIVE HYSTEROSCOPY WITH MORCELLATOR (MYOSURE);  Surgeon: Aziza Sanchez MD;  Location: [facility]     SINUS SURGERY           SOCIAL HISTORY:  Social History     Socioeconomic History     Marital status:      Spouse name: Not on file     Number of children: Not on file     Years of education: Not on file     Highest education level: Not on file   Occupational History     Not on file   Tobacco Use     Smoking status: Never Smoker     Smokeless tobacco: Never Used   Substance and Sexual Activity     Alcohol use: No     Drug use: No     Sexual activity: Not Currently     Partners: Male   Other Topics Concern     Parent/sibling w/ CABG, MI or angioplasty before 65F 55M? Not Asked   Social History Narrative     Not on file     Social Determinants of Health     Financial Resource Strain:      Difficulty of Paying Living Expenses:    Food Insecurity:      Worried About Running Out of Food in the Last Year:      Ran Out of Food in the Last Year:    Transportation Needs:      Lack of Transportation (Medical):      Lack of Transportation (Non-Medical):    Physical Activity:      Days of Exercise per Week:      Minutes of  "Exercise per Session:    Stress:      Feeling of Stress :    Social Connections:      Frequency of Communication with Friends and Family:      Frequency of Social Gatherings with Friends and Family:      Attends Tenri[employer] Services:      Active Member of Clubs or Organizations:      Attends Club or Organization Meetings:      Marital Status:    Intimate Partner Violence:      Fear of Current or Ex-Partner:      Emotionally Abused:      Physically Abused:      Sexually Abused:          FAMILY HISTORY:  No family history on file.      PHYSICAL EXAM:  Vital signs:  /58   Pulse 73   Temp 98.5  F (36.9  C)   Resp 16   Ht 1.626 m (5' 4\")   Wt 58.8 kg (129 lb 9.6 oz)   SpO2 97%   BMI 22.25 kg/m     GENERAL/CONSTITUTIONAL: No acute distress.  EYES: No erythema or scleral icterus.  LYMPH: No cervical, supraclavicular, axillary adenopathy.   BREAST: No palpable masses in either breast with exception of lumpectomy scar tissue at right outer breast. Nipples are everted bilaterally with no discharge. No erythema, ulceration, or dimpling of the skin.  RESPIRATORY: No audible cough or wheezing.   GASTROINTESTINAL: No hepatosplenomegaly, masses, or tenderness. No guarding.  No distention.  MUSCULOSKELETAL: Warm and well-perfused, no cyanosis, clubbing, or edema.  NEUROLOGIC: Alert, oriented, answers questions appropriately.  INTEGUMENTARY: No rashes or jaundice.  GAIT: Steady, does not use assistive device      LABS:  CBC RESULTS: Recent Labs   Lab Test 11/08/21  0857   WBC 5.3   RBC 4.48   HGB 13.1   HCT 40.8   MCV 91   MCH 29.2   MCHC 32.1   RDW 14.2        Last Comprehensive Metabolic Panel:  Sodium   Date Value Ref Range Status   11/08/2021 140 133 - 144 mmol/L Final   11/16/2020 137 133 - 144 mmol/L Final     Potassium   Date Value Ref Range Status   11/08/2021 4.1 3.4 - 5.3 mmol/L Final   11/16/2020 3.7 3.4 - 5.3 mmol/L Final     Chloride   Date Value Ref Range Status   11/08/2021 106 94 - 109 mmol/L Final "   11/16/2020 104 94 - 109 mmol/L Final     Carbon Dioxide   Date Value Ref Range Status   11/16/2020 28 20 - 32 mmol/L Final     Carbon Dioxide (CO2)   Date Value Ref Range Status   11/08/2021 31 20 - 32 mmol/L Final     Anion Gap   Date Value Ref Range Status   11/08/2021 3 3 - 14 mmol/L Final   11/16/2020 5 3 - 14 mmol/L Final     Glucose   Date Value Ref Range Status   11/08/2021 89 70 - 99 mg/dL Final   11/16/2020 94 70 - 99 mg/dL Final     Urea Nitrogen   Date Value Ref Range Status   11/08/2021 16 7 - 30 mg/dL Final   11/16/2020 17 7 - 30 mg/dL Final     Creatinine   Date Value Ref Range Status   11/08/2021 0.60 0.52 - 1.04 mg/dL Final   11/16/2020 0.68 0.52 - 1.04 mg/dL Final     GFR Estimate   Date Value Ref Range Status   11/08/2021 83 >60 mL/min/1.73m2 Final     Comment:     As of July 11, 2021, eGFR is calculated by the CKD-EPI creatinine equation, without race adjustment. eGFR can be influenced by muscle mass, exercise, and diet. The reported eGFR is an estimation only and is only applicable if the renal function is stable.   11/16/2020 80 >60 mL/min/[1.73_m2] Final     Comment:     Non  GFR Calc  Starting 12/18/2018, serum creatinine based estimated GFR (eGFR) will be   calculated using the Chronic Kidney Disease Epidemiology Collaboration   (CKD-EPI) equation.       Calcium   Date Value Ref Range Status   11/08/2021 9.5 8.5 - 10.1 mg/dL Final   11/16/2020 9.4 8.5 - 10.1 mg/dL Final     Bilirubin Total   Date Value Ref Range Status   11/08/2021 0.6 0.2 - 1.3 mg/dL Final   11/16/2020 0.8 0.2 - 1.3 mg/dL Final     Alkaline Phosphatase   Date Value Ref Range Status   11/08/2021 75 40 - 150 U/L Final   11/16/2020 79 40 - 150 U/L Final     ALT   Date Value Ref Range Status   11/08/2021 20 0 - 50 U/L Final   11/16/2020 18 0 - 50 U/L Final     AST   Date Value Ref Range Status   11/08/2021 24 0 - 45 U/L Final   11/16/2020 17 0 - 45 U/L Final     CA 27-29 = 19    PATHOLOGY:  Reviewed as per  HPI.    IMAGIN2021: Mammogram showed no suspicious findings.    ASSESSMENT/PLAN:  Audra Hayden is a 86 year old female with the following issues:  1. Stage IA, [id number], grade 2 invasive ductal carcinoma of right breast, ER/SD positive, HER-2/maya FISH negative, Oncotype DX  = 17  --Audra was diagnosed in  and is s/p right lumpectomy, radiation, and 5 years of hormone blockade therapy, completed in 2017.  --Discussed with Audra that she has no clinical evidence for recurrent breast cancer by physical exam or mammogram reviewed from 2021.  --Continue annual mammograms.  --Discussed that as per NCCN and ASCO guidelines, that routine labs are not recommended as they have not been shown to improve overall survival in breast cancer surveillance.  I would only recommend lab and/or imaging workup as clinically indicated.    2. Osteopenia  --Seen on 11/10/2016 DEXA scan. I advised she see her primary care provider for repeat DEXA scan.    3. Vaginal atrophy  4. Intermittent urinary tract infections  --I advised use of vaginal estrogen cream -- apply pea size amount once daily for 2 weeks followed by twice weekly maintenance. Discussed that beneficial changes may not be felt for at least 4-6 weeks.  --Discussed that as per ACOG, use of the minimal amount of vaginal estrogen cream does not substantially increase the risk of breast cancer recurrence and as such, can be utilized in patients who have had breast cancer.    Return in 1 year.    Gladys Asif MD  Hematology/Oncology  [facility]    Total time spent: 30 minutes in patient evaluation, counseling, documentation, and coordination of care.      Oncology Rooming Note    November 10, 2021 9:04 AM   Audra Hayden is a 86 year old female who presents for:    Chief Complaint   Patient presents with     Oncology Clinic Visit     Initial Vitals: There were no vitals taken for this visit. Estimated body mass index is 21.22 kg/m  as  "calculated from the following:    Height as of 12/16/19: 1.626 m (5' 4\").    Weight as of 11/18/20: 56.1 kg (123 lb 9.6 oz). There is no height or weight on file to calculate BSA.  Data Unavailable Comment: Data Unavailable   No LMP recorded. Patient is postmenopausal.  Allergies reviewed: Yes  Medications reviewed: Yes    Medications: Medication refills not needed today.  Pharmacy name entered into [facility]:    [id number] - [place], [place] - [address] [facility] [place], [place] - [address] [address]    Clinical concerns:  doctor was notified.      Cristel Gutierrez MA                Again, thank you for allowing me to participate in the care of your patient.        Sincerely,        Gladys Asif MD    "

## 2022-05-23 ENCOUNTER — HOSPITAL ENCOUNTER (OUTPATIENT)
Dept: GENERAL RADIOLOGY | Facility: CLINIC | Age: 87
Discharge: HOME | End: 2022-05-23
Attending: PHYSICIAN ASSISTANT | Admitting: PHYSICIAN ASSISTANT
Payer: COMMERCIAL

## 2022-05-23 DIAGNOSIS — R13.19 ESOPHAGEAL DYSPHAGIA: ICD-10-CM

## 2022-05-23 PROCEDURE — 74220 X-RAY XM ESOPHAGUS 1CNTRST: CPT

## 2022-05-23 PROCEDURE — 255N000001 HC RX 255: Performed by: PHYSICIAN ASSISTANT

## 2022-05-23 RX ADMIN — ANTACID/ANTIFLATULENT 4 G: 380; 550; 10; 10 GRANULE, EFFERVESCENT ORAL at 14:40

## 2022-07-06 ENCOUNTER — TRANSFERRED RECORDS (OUTPATIENT)
Dept: HEALTH INFORMATION MANAGEMENT | Facility: CLINIC | Age: 87
End: 2022-07-06

## 2022-07-06 LAB
CREATININE (EXTERNAL): 0.78 MG/DL (ref 0.6–0.88)
GFR ESTIMATED (EXTERNAL): 69 ML/MIN/1.73M2
GFR ESTIMATED (IF AFRICAN AMERICAN) (EXTERNAL): 80 ML/MIN/1.73M2
GLUCOSE (EXTERNAL): 101 MG/DL (ref 65–99)
INR (EXTERNAL): 0.9 (ref 0.9–1.1)
POTASSIUM (EXTERNAL): 4.1 MMOL/L (ref 3.5–5.3)

## 2022-07-12 ENCOUNTER — LAB REQUISITION (OUTPATIENT)
Dept: LAB | Facility: CLINIC | Age: 87
End: 2022-07-12
Payer: COMMERCIAL

## 2022-07-12 DIAGNOSIS — Z01.818 ENCOUNTER FOR OTHER PREPROCEDURAL EXAMINATION: ICD-10-CM

## 2022-07-12 PROCEDURE — U0003 INFECTIOUS AGENT DETECTION BY NUCLEIC ACID (DNA OR RNA); SEVERE ACUTE RESPIRATORY SYNDROME CORONAVIRUS 2 (SARS-COV-2) (CORONAVIRUS DISEASE [COVID-19]), AMPLIFIED PROBE TECHNIQUE, MAKING USE OF HIGH THROUGHPUT TECHNOLOGIES AS DESCRIBED BY CMS-2020-01-R: HCPCS | Mod: ORL | Performed by: FAMILY MEDICINE

## 2022-07-13 LAB — SARS-COV-2 RNA RESP QL NAA+PROBE: NEGATIVE

## 2022-07-14 ENCOUNTER — ANESTHESIA EVENT (OUTPATIENT)
Dept: GASTROENTEROLOGY | Facility: CLINIC | Age: 87
End: 2022-07-14
Payer: COMMERCIAL

## 2022-07-14 RX ORDER — FENTANYL CITRATE 50 UG/ML
25 INJECTION, SOLUTION INTRAMUSCULAR; INTRAVENOUS EVERY 5 MIN PRN
Status: CANCELLED | OUTPATIENT
Start: 2022-07-14

## 2022-07-14 RX ORDER — OXYCODONE HYDROCHLORIDE 5 MG/1
5 TABLET ORAL EVERY 4 HOURS PRN
Status: CANCELLED | OUTPATIENT
Start: 2022-07-14

## 2022-07-14 RX ORDER — HYDROMORPHONE HCL IN WATER/PF 6 MG/30 ML
0.4 PATIENT CONTROLLED ANALGESIA SYRINGE INTRAVENOUS EVERY 5 MIN PRN
Status: CANCELLED | OUTPATIENT
Start: 2022-07-14

## 2022-07-14 RX ORDER — SODIUM CHLORIDE, SODIUM LACTATE, POTASSIUM CHLORIDE, CALCIUM CHLORIDE 600; 310; 30; 20 MG/100ML; MG/100ML; MG/100ML; MG/100ML
INJECTION, SOLUTION INTRAVENOUS CONTINUOUS
Status: CANCELLED | OUTPATIENT
Start: 2022-07-14

## 2022-07-14 NOTE — ANESTHESIA PREPROCEDURE EVALUATION
Anesthesia Pre-Procedure Evaluation    Patient: Audra Hayden   MRN: [id number] : 1935        Procedure : Procedure(s):  ESOPHAGOGASTRODUODENOSCOPY (EGD)          Past Medical History:   Diagnosis Date     Breast cancer (H)     lumpectomy plus radiation     Hyperlipidemia LDL goal < 130      Hypertension goal BP (blood pressure) < 140/90      Hypokalemia      Ophthalmic migraine      Osteoporosis      PONV (postoperative nausea and vomiting)      Skin cancer      Thickened endometrium       Past Surgical History:   Procedure Laterality Date     APPENDECTOMY       ARTHROSCOPY KNEE RT/LT       BASAL CELL       BIOPSY BREAST NEEDLE LOCALIZATION, BIOPSY NODE SENTINEL, COMBINED  2012    Procedure: COMBINED BIOPSY BREAST NEEDLE LOCALIZATION, BIOPSY NODE SENTINEL;  RIGHT BREAST LUMPECTOMY WITH SENTINEL NODE BIOPSY, WIRE LOCALIZATION;  Surgeon: Benji Schwab MD;  Location: [facility]     COLONOSCOPY       CT TEMPOROMANDIBULAR JOINTS       CYSTOSCOPY N/A 2018    Procedure: CYSTOSCOPY;;  Surgeon: Clary Hernandez MD;  Location: [facility]     DILATION AND CURETTAGE, OPERATIVE HYSTEROSCOPY WITH MORCELLATOR, COMBINED N/A 2017    Procedure: COMBINED DILATION AND CURETTAGE, OPERATIVE HYSTEROSCOPY WITH MORCELLATOR;  Surgeon: Clary Hernandez MD;  Location: [facility]     DISCECTOMY LUMBAR ANTERIOR       FOOT SURGERY      LEFT     HC BLEPHAROPLASTY UPPER LID W EXCESS SKIN       LAPAROSCOPIC ASSISTED HYSTERECTOMY VAGINAL, BILATERAL SALPINGO-OOPHORECTOMY, COMBINED Bilateral 2018    Procedure: COMBINED LAPAROSCOPIC ASSISTED HYSTERECTOMY VAGINAL, SALPINGO-OOPHORECTOMY;  COMBINED LAPAROSCOPIC ASSISTED HYSTERECTOMY VAGINAL, BILATERAL SALPINGO-OOPHORECTOMY ; PELVIC WASHINGS CYSTO;  Surgeon: Clary Hernandez MD;  Location: [facility]     LUMPECTOMY BREAST  2012    brest cancer     MOHS MICROGRAPHIC PROCEDURE       OPERATIVE HYSTEROSCOPY WITH MORCELLATOR N/A 2015    Procedure: OPERATIVE HYSTEROSCOPY WITH  MORCELLATOR (MYOSURE);  Surgeon: Aziza Sanchez MD;  Location: [facility]     SINUS SURGERY        Allergies   Allergen Reactions     Contrast Dye Hives and Swelling     Throat swelling     Other [No Clinical Screening - See Comments]      Pt has trouble with some anesthetics - not sure which ones     Zofran [Ondansetron Hcl-Dextrose]      Tremors, involuntary movements, diptonia     Sulfa Drugs Rash      Social History     Tobacco Use     Smoking status: Never Smoker     Smokeless tobacco: Never Used   Substance Use Topics     Alcohol use: No      Wt Readings from Last 1 Encounters:   11/10/21 58.8 kg (129 lb 9.6 oz)        Anesthesia Evaluation   Pt has had prior anesthetic.     History of anesthetic complications  - PONV.      ROS/MED HX  ENT/Pulmonary: Comment: 2 TMJ surgeries   (-) sleep apnea   Neurologic:     (+) migraines,     Cardiovascular:     (+) hypertension-range: controlled/ ----    METS/Exercise Tolerance:     Hematologic:       Musculoskeletal:       GI/Hepatic: Comment: dyphagia   (-) GERD   Renal/Genitourinary:       Endo:       Psychiatric/Substance Use:       Infectious Disease:       Malignancy:   (+) Malignancy, History of Breast.Breast CA Remission status post.        Other:            Physical Exam    Airway        Mallampati: III   TM distance: > 3 FB   Neck ROM: full   Mouth opening: < 3 cm    Respiratory Devices and Support         Dental  no notable dental history         Cardiovascular   cardiovascular exam normal       Rhythm and rate: regular and normal     Pulmonary   pulmonary exam normal        breath sounds clear to auscultation           OUTSIDE LABS:  CBC:   Lab Results   Component Value Date    WBC 5.3 11/08/2021    WBC 6.1 10/22/2014    HGB 13.1 11/08/2021    HGB 12.1 08/30/2018    HCT 40.8 11/08/2021    HCT 38.5 10/22/2014     11/08/2021     10/22/2014     BMP:   Lab Results   Component Value Date     11/08/2021     11/16/2020     POTASSIUM 4.1 11/08/2021    POTASSIUM 3.7 11/16/2020    CHLORIDE 106 11/08/2021    CHLORIDE 104 11/16/2020    CO2 31 11/08/2021    CO2 28 11/16/2020    BUN 16 11/08/2021    BUN 17 11/16/2020    CR 0.60 11/08/2021    CR 0.68 11/16/2020    GLC 89 11/08/2021    GLC 94 11/16/2020     COAGS: No results found for: PTT, INR, FIBR  POC: No results found for: BGM, HCG, HCGS  HEPATIC:   Lab Results   Component Value Date    ALBUMIN 4.0 11/08/2021    PROTTOTAL 8.0 11/08/2021    ALT 20 11/08/2021    AST 24 11/08/2021    ALKPHOS 75 11/08/2021    BILITOTAL 0.6 11/08/2021     OTHER:   Lab Results   Component Value Date    SHAWNEE 9.5 11/08/2021    TSH 1.89 10/22/2014       Anesthesia Plan    ASA Status:  3   NPO Status:  NPO Appropriate    Anesthesia Type: MAC.     - Reason for MAC: chronic cardiopulmonary disease, straight local not clinically adequate              Consents    Anesthesia Plan(s) and associated risks, benefits, and realistic alternatives discussed. Questions answered and patient/representative(s) expressed understanding.    - Discussed:     - Discussed with:  Patient         Postoperative Care    Pain management: IV analgesics.        Comments:    Other Comments: EMR reviewed    NO ZOFRAN  CRNA to topicalize the airway  Avoid fentanyl            Randy Linder MD

## 2022-07-15 ENCOUNTER — HOSPITAL ENCOUNTER (OUTPATIENT)
Facility: CLINIC | Age: 87
Discharge: HOME | End: 2022-07-15
Attending: INTERNAL MEDICINE | Admitting: INTERNAL MEDICINE
Payer: COMMERCIAL

## 2022-07-15 ENCOUNTER — ANESTHESIA (OUTPATIENT)
Dept: GASTROENTEROLOGY | Facility: CLINIC | Age: 87
End: 2022-07-15
Payer: COMMERCIAL

## 2022-07-15 VITALS
RESPIRATION RATE: 16 BRPM | BODY MASS INDEX: 21.51 KG/M2 | SYSTOLIC BLOOD PRESSURE: 142 MMHG | OXYGEN SATURATION: 100 % | DIASTOLIC BLOOD PRESSURE: 64 MMHG | WEIGHT: 126 LBS | HEIGHT: 64 IN | HEART RATE: 68 BPM

## 2022-07-15 LAB — UPPER GI ENDOSCOPY: NORMAL

## 2022-07-15 PROCEDURE — 258N000003 HC RX IP 258 OP 636: Performed by: NURSE ANESTHETIST, CERTIFIED REGISTERED

## 2022-07-15 PROCEDURE — 43249 ESOPH EGD DILATION <30 MM: CPT | Performed by: INTERNAL MEDICINE

## 2022-07-15 PROCEDURE — 999N000010 HC STATISTIC ANES STAT CODE-CRNA PER MINUTE: Performed by: INTERNAL MEDICINE

## 2022-07-15 PROCEDURE — 250N000009 HC RX 250: Performed by: NURSE ANESTHETIST, CERTIFIED REGISTERED

## 2022-07-15 PROCEDURE — 370N000017 HC ANESTHESIA TECHNICAL FEE, PER MIN: Performed by: INTERNAL MEDICINE

## 2022-07-15 PROCEDURE — 250N000011 HC RX IP 250 OP 636: Performed by: NURSE ANESTHETIST, CERTIFIED REGISTERED

## 2022-07-15 PROCEDURE — 43239 EGD BIOPSY SINGLE/MULTIPLE: CPT | Mod: XS | Performed by: INTERNAL MEDICINE

## 2022-07-15 PROCEDURE — 88305 TISSUE EXAM BY PATHOLOGIST: CPT | Mod: TC | Performed by: INTERNAL MEDICINE

## 2022-07-15 RX ORDER — SODIUM CHLORIDE, SODIUM LACTATE, POTASSIUM CHLORIDE, CALCIUM CHLORIDE 600; 310; 30; 20 MG/100ML; MG/100ML; MG/100ML; MG/100ML
INJECTION, SOLUTION INTRAVENOUS CONTINUOUS
Status: DISCONTINUED | OUTPATIENT
Start: 2022-07-15 | End: 2022-07-15 | Stop reason: HOSPADM

## 2022-07-15 RX ORDER — PROPOFOL 10 MG/ML
INJECTION, EMULSION INTRAVENOUS CONTINUOUS PRN
Status: DISCONTINUED | OUTPATIENT
Start: 2022-07-15 | End: 2022-07-15

## 2022-07-15 RX ORDER — SODIUM CHLORIDE, SODIUM LACTATE, POTASSIUM CHLORIDE, CALCIUM CHLORIDE 600; 310; 30; 20 MG/100ML; MG/100ML; MG/100ML; MG/100ML
INJECTION, SOLUTION INTRAVENOUS CONTINUOUS PRN
Status: DISCONTINUED | OUTPATIENT
Start: 2022-07-15 | End: 2022-07-15

## 2022-07-15 RX ORDER — NALOXONE HYDROCHLORIDE 0.4 MG/ML
0.4 INJECTION, SOLUTION INTRAMUSCULAR; INTRAVENOUS; SUBCUTANEOUS
Status: CANCELLED | OUTPATIENT
Start: 2022-07-15

## 2022-07-15 RX ORDER — PROCHLORPERAZINE MALEATE 5 MG
5 TABLET ORAL EVERY 6 HOURS PRN
Status: CANCELLED | OUTPATIENT
Start: 2022-07-15

## 2022-07-15 RX ORDER — LIDOCAINE HYDROCHLORIDE 20 MG/ML
SOLUTION OROPHARYNGEAL PRN
Status: DISCONTINUED | OUTPATIENT
Start: 2022-07-15 | End: 2022-07-15

## 2022-07-15 RX ORDER — DEXMEDETOMIDINE HYDROCHLORIDE 4 UG/ML
INJECTION, SOLUTION INTRAVENOUS PRN
Status: DISCONTINUED | OUTPATIENT
Start: 2022-07-15 | End: 2022-07-15

## 2022-07-15 RX ORDER — FLUMAZENIL 0.1 MG/ML
0.2 INJECTION, SOLUTION INTRAVENOUS
Status: CANCELLED | OUTPATIENT
Start: 2022-07-15 | End: 2022-07-15

## 2022-07-15 RX ORDER — NALOXONE HYDROCHLORIDE 0.4 MG/ML
0.2 INJECTION, SOLUTION INTRAMUSCULAR; INTRAVENOUS; SUBCUTANEOUS
Status: CANCELLED | OUTPATIENT
Start: 2022-07-15

## 2022-07-15 RX ORDER — ONDANSETRON 2 MG/ML
4 INJECTION INTRAMUSCULAR; INTRAVENOUS EVERY 6 HOURS PRN
Status: CANCELLED | OUTPATIENT
Start: 2022-07-15

## 2022-07-15 RX ORDER — FENTANYL CITRATE 50 UG/ML
50 INJECTION, SOLUTION INTRAMUSCULAR; INTRAVENOUS
Status: DISCONTINUED | OUTPATIENT
Start: 2022-07-15 | End: 2022-07-15 | Stop reason: HOSPADM

## 2022-07-15 RX ORDER — ONDANSETRON 4 MG/1
4 TABLET, ORALLY DISINTEGRATING ORAL EVERY 6 HOURS PRN
Status: CANCELLED | OUTPATIENT
Start: 2022-07-15

## 2022-07-15 RX ADMIN — TOPICAL ANESTHETIC 2 SPRAY: 200 SPRAY DENTAL; PERIODONTAL at 11:13

## 2022-07-15 RX ADMIN — PROPOFOL 125 MCG/KG/MIN: 10 INJECTION, EMULSION INTRAVENOUS at 11:18

## 2022-07-15 RX ADMIN — SODIUM CHLORIDE, POTASSIUM CHLORIDE, SODIUM LACTATE AND CALCIUM CHLORIDE: 600; 310; 30; 20 INJECTION, SOLUTION INTRAVENOUS at 11:14

## 2022-07-15 RX ADMIN — DEXMEDETOMIDINE 8 MCG: 100 INJECTION, SOLUTION, CONCENTRATE INTRAVENOUS at 11:21

## 2022-07-15 RX ADMIN — LIDOCAINE HYDROCHLORIDE 5 ML: 20 SOLUTION ORAL; TOPICAL at 11:13

## 2022-07-15 NOTE — ANESTHESIA CARE TRANSFER NOTE
Patient: Audra Hayden    Procedure: Procedure(s):  ESOPHAGOGASTRODUODENOSCOPY, WITH BALLOON DILATION OF LESS THAN 30 MILLIMETERS  ESOPHAGOGASTRODUODENOSCOPY, WITH BIOPSY       Diagnosis: Dysphagia [R13.10]  Diagnosis Additional Information: No value filed.    Anesthesia Type:   MAC     Note:    Oropharynx: oropharynx clear of all foreign objects  Level of Consciousness: awake and drowsy  Oxygen Supplementation: room air    Independent Airway: airway patency satisfactory and stable  Dentition: dentition unchanged  Vital Signs Stable: post-procedure vital signs reviewed and stable  Report to RN Given: handoff report given  Patient transferred to: PACU    Handoff Report: Identifed the Patient, Identified the Reponsible Provider, Reviewed the pertinent medical history, Discussed the surgical course, Reviewed Intra-OP anesthesia mangement and issues during anesthesia, Set expectations for post-procedure period and Allowed opportunity for questions and acknowledgement of understanding      Vitals:  Vitals Value Taken Time   BP     Temp     Pulse 76 07/15/22 1143   Resp 16 07/15/22 1143   SpO2 94 % 07/15/22 1143   Vitals shown include unvalidated device data.    Electronically Signed By: LOKESH Gannon CRNA  July 15, 2022  11:44 AM   RACHANA vee with verbal

## 2022-07-15 NOTE — ANESTHESIA POSTPROCEDURE EVALUATION
Patient: Audra Hayden    Procedure: Procedure(s):  ESOPHAGOGASTRODUODENOSCOPY, WITH BALLOON DILATION OF LESS THAN 30 MILLIMETERS  ESOPHAGOGASTRODUODENOSCOPY, WITH BIOPSY       Anesthesia Type:  MAC    Note:  Disposition: Outpatient   Postop Pain Control: Uneventful            Sign Out: Well controlled pain   PONV: No   Neuro/Psych: Uneventful            Sign Out: Acceptable/Baseline neuro status   Airway/Respiratory: Uneventful            Sign Out: Acceptable/Baseline resp. status   CV/Hemodynamics: Uneventful            Sign Out: Acceptable CV status; No obvious hypovolemia; No obvious fluid overload   Other NRE: NONE   DID A NON-ROUTINE EVENT OCCUR? No           Last vitals:  Vitals Value Taken Time   /60 07/15/22 1210   Temp     Pulse 67 07/15/22 1213   Resp 21 07/15/22 1213   SpO2 98 % 07/15/22 1202   Vitals shown include unvalidated device data.    Electronically Signed By: Randy Linder MD  July 15, 2022  12:53 PM

## 2022-07-18 LAB
PATH REPORT.COMMENTS IMP SPEC: NORMAL
PATH REPORT.FINAL DX SPEC: NORMAL
PATH REPORT.GROSS SPEC: NORMAL
PATH REPORT.MICROSCOPIC SPEC OTHER STN: NORMAL
PATH REPORT.RELEVANT HX SPEC: NORMAL
PHOTO IMAGE: NORMAL

## 2022-07-18 PROCEDURE — 88305 TISSUE EXAM BY PATHOLOGIST: CPT | Mod: 26 | Performed by: PATHOLOGY

## 2022-08-04 ENCOUNTER — HOSPITAL ENCOUNTER (OUTPATIENT)
Dept: MAMMOGRAPHY | Facility: CLINIC | Age: 87
Discharge: HOME | End: 2022-08-04
Attending: INTERNAL MEDICINE | Admitting: INTERNAL MEDICINE
Payer: COMMERCIAL

## 2022-08-04 DIAGNOSIS — Z85.3 PERSONAL HISTORY OF MALIGNANT NEOPLASM OF BREAST: ICD-10-CM

## 2022-08-04 DIAGNOSIS — Z12.31 ENCOUNTER FOR SCREENING MAMMOGRAM FOR BREAST CANCER: ICD-10-CM

## 2022-08-04 PROCEDURE — 77067 SCR MAMMO BI INCL CAD: CPT

## 2022-11-26 NOTE — PROGRESS NOTES
[facility]    Hematology/Oncology Established Patient Follow-up Note      Today's Date: 12/7/2022    Reason for Follow-up: Right breast cancer.    HISTORY OF PRESENT ILLNESS: Audra Hayden is a 87 year old female who presents with the following oncologic history:  --6/2012: Diagnosed at age 77 with right breast cancer -- 1.1 cm on imaging.  --6/28/2012: Biopsy of right breast mass showed an ER/NE positive grade 2 invasive ductal carcinoma.   --7/13/2012: Undewent right lumpectomy and sentinel lymph node biopsy. Pathology showed an invasive ductal carcinoma, 1.3 cm, grade 2, margins negative, negative sentinel lymph nodes. Staging was pT1c pN0. HER2/maya by FISH was negative. Oncotype RS = 17, low risk.   --10/1/2012: Completed adjuvant radiation therapy to the right breast.  --10/8/2012: Started letrozole.  --8/22/2012: Switched to anastrozole due to bony pain.  --4/2014: Switched to tamoxifen.  --11/2017: Completion of hormone blockade therapy due to age, endometrial polyps/thickening.  --8/2018: Underwent hysterectomy, oohprectomy and salphingoophrectomy.    INTERIM HISTORY:  Audra reports no new breast complaints and no new pain. She continues to have intermittent emesis every few weeks.    REVIEW OF SYSTEMS:   14 point ROS was reviewed and is negative other than as noted above in HPI.       HOME MEDICATIONS:  Current Outpatient Medications   Medication Sig Dispense Refill     Calcium Carbonate-Vitamin D (CALCIUM + D PO) Take 1 capsule by mouth daily        Cholecalciferol (VITAMIN D) 1000 UNITS capsule Take 1,000 Units by mouth daily        cholestyramine (QUESTRAN) 4 g packet Take 1 (one) Packet twice daily as instructed       estradiol (ESTRACE) 0.1 MG/GM vaginal cream Place 2 g vaginally twice a week after initial application once daily for 2 weeks 42.5 g 3     MAGNESIUM OXIDE PO Take 2 tablets by mouth daily as needed for leg cramps        MECLIZINE HCL PO Take 25 mg by mouth 3 times daily  as needed for dizziness        Naproxen Sodium (ALEVE PO) Take 220 mg by mouth 2 times daily as needed for moderate pain For headaches        omeprazole (PRILOSEC) 20 MG DR capsule Take 1 capsule (20 mg) by mouth 2 times daily 120 capsule 0     Potassium Chloride Chantell ER (K-DUR PO) Take 20 mEq by mouth 2 times daily       triamterene-hydrochlorothiazide (DYAZIDE) 37.5-25 MG per capsule TAKE 1 CAPSULE BY MOUTH DAILY. 90 capsule 0     verapamil (VERELAN) 240 MG CP24 TAKE 1 CAPSULE (240 MG) BY ORAL ROUTE ONCE DAILY 90 capsule 0         ALLERGIES:  Allergies   Allergen Reactions     Contrast Dye Hives and Swelling     Throat swelling     Other [No Clinical Screening - See Comments]      Pt has trouble with some anesthetics - not sure which ones     Zofran [Ondansetron Hcl-Dextrose]      Tremors, involuntary movements, diptonia     Sulfa Drugs Rash         PAST MEDICAL HISTORY:  Past Medical History:   Diagnosis Date     Breast cancer (H) 2012    lumpectomy plus radiation     Hyperlipidemia LDL goal < 130      Hypertension goal BP (blood pressure) < 140/90      Hypokalemia      Ophthalmic migraine      Osteoporosis      PONV (postoperative nausea and vomiting)      Skin cancer      Thickened endometrium          PAST SURGICAL HISTORY:  Past Surgical History:   Procedure Laterality Date     APPENDECTOMY       ARTHROSCOPY KNEE RT/LT       BASAL CELL       BIOPSY BREAST NEEDLE LOCALIZATION, BIOPSY NODE SENTINEL, COMBINED  7/13/2012    Procedure: COMBINED BIOPSY BREAST NEEDLE LOCALIZATION, BIOPSY NODE SENTINEL;  RIGHT BREAST LUMPECTOMY WITH SENTINEL NODE BIOPSY, WIRE LOCALIZATION;  Surgeon: Benji Schwab MD;  Location: [facility]     COLONOSCOPY       CT TEMPOROMANDIBULAR JOINTS W/O CONTRAST       CYSTOSCOPY N/A 8/30/2018    Procedure: CYSTOSCOPY;;  Surgeon: Clary Hernandez MD;  Location: [facility]     DILATION AND CURETTAGE, OPERATIVE HYSTEROSCOPY WITH MORCELLATOR, COMBINED N/A 2/17/2017    Procedure: COMBINED DILATION AND  CURETTAGE, OPERATIVE HYSTEROSCOPY WITH MORCELLATOR;  Surgeon: Clary Hernandez MD;  Location: [facility]     DISCECTOMY LUMBAR ANTERIOR       ESOPHAGOSCOPY, GASTROSCOPY, DUODENOSCOPY (EGD), COMBINED N/A 7/15/2022    Procedure: ESOPHAGOGASTRODUODENOSCOPY, WITH BIOPSY;  Surgeon: Uli Mcgee MD;  Location:  GI     FOOT SURGERY      LEFT     HC BLEPHAROPLASTY UPPER LID W EXCESS SKIN       LAPAROSCOPIC ASSISTED HYSTERECTOMY VAGINAL, BILATERAL SALPINGO-OOPHORECTOMY, COMBINED Bilateral 8/30/2018    Procedure: COMBINED LAPAROSCOPIC ASSISTED HYSTERECTOMY VAGINAL, SALPINGO-OOPHORECTOMY;  COMBINED LAPAROSCOPIC ASSISTED HYSTERECTOMY VAGINAL, BILATERAL SALPINGO-OOPHORECTOMY ; PELVIC WASHINGS CYSTO;  Surgeon: Clary Hernandez MD;  Location: [facility]     LUMPECTOMY BREAST  7/2012    brest cancer     MOHS MICROGRAPHIC PROCEDURE       OPERATIVE HYSTEROSCOPY WITH MORCELLATOR N/A 4/16/2015    Procedure: OPERATIVE HYSTEROSCOPY WITH MORCELLATOR (MYOSURE);  Surgeon: Aziza Sanchez MD;  Location: [facility]     SINUS SURGERY           SOCIAL HISTORY:  Social History     Socioeconomic History     Marital status:      Spouse name: Not on file     Number of children: Not on file     Years of education: Not on file     Highest education level: Not on file   Occupational History     Not on file   Tobacco Use     Smoking status: Never     Smokeless tobacco: Never   Substance and Sexual Activity     Alcohol use: No     Drug use: No     Sexual activity: Not Currently     Partners: Male   Other Topics Concern     Parent/sibling w/ CABG, MI or angioplasty before 65F 55M? Not Asked   Social History Narrative     Not on file     Social Determinants of Health     Financial Resource Strain: Not on file   Food Insecurity: Not on file   Transportation Needs: Not on file   Physical Activity: Not on file   Stress: Not on file   Social Connections: Not on file   Intimate Partner Violence: Not on file   Housing Stability: Not on file  "        FAMILY HISTORY:  Family History   Problem Relation Age of Onset     Breast Cancer Sister          PHYSICAL EXAM:  Vital signs:  /66   Pulse 66   Resp 16   Ht 1.626 m (5' 4\")   Wt 55.8 kg (123 lb)   SpO2 99%   BMI 21.11 kg/m     GENERAL/CONSTITUTIONAL: No acute distress.  EYES: No erythema or scleral icterus.  LYMPH: No cervical, supraclavicular, axillary adenopathy.   BREAST: No palpable masses in either breast with exception of firm lumpectomy scar tissue at right outer breast, stable compared to prior exam. Nipples are everted bilaterally with no discharge. No erythema, ulceration, or dimpling of the skin.  RESPIRATORY: No audible cough or wheezing.   GASTROINTESTINAL: No hepatosplenomegaly, masses, or tenderness. No guarding.  No distention.  MUSCULOSKELETAL: Warm and well-perfused, no cyanosis, clubbing, or edema.  NEUROLOGIC: Alert, oriented, answers questions appropriately.  INTEGUMENTARY: No rashes or jaundice.  GAIT: Steady, does not use assistive device      LABS:  CBC RESULTS: Recent Labs   Lab Test 11/08/21  0857   WBC 5.3   RBC 4.48   HGB 13.1   HCT 40.8   MCV 91   MCH 29.2   MCHC 32.1   RDW 14.2        Last Comprehensive Metabolic Panel:  Sodium   Date Value Ref Range Status   11/08/2021 140 133 - 144 mmol/L Final   11/16/2020 137 133 - 144 mmol/L Final     Potassium   Date Value Ref Range Status   11/08/2021 4.1 3.4 - 5.3 mmol/L Final   11/16/2020 3.7 3.4 - 5.3 mmol/L Final     Chloride   Date Value Ref Range Status   11/08/2021 106 94 - 109 mmol/L Final   11/16/2020 104 94 - 109 mmol/L Final     Carbon Dioxide   Date Value Ref Range Status   11/16/2020 28 20 - 32 mmol/L Final     Carbon Dioxide (CO2)   Date Value Ref Range Status   11/08/2021 31 20 - 32 mmol/L Final     Anion Gap   Date Value Ref Range Status   11/08/2021 3 3 - 14 mmol/L Final   11/16/2020 5 3 - 14 mmol/L Final     Glucose   Date Value Ref Range Status   11/08/2021 89 70 - 99 mg/dL Final   11/16/2020 94 70 - " 99 mg/dL Final     Urea Nitrogen   Date Value Ref Range Status   11/08/2021 16 7 - 30 mg/dL Final   11/16/2020 17 7 - 30 mg/dL Final     Creatinine   Date Value Ref Range Status   11/08/2021 0.60 0.52 - 1.04 mg/dL Final   11/16/2020 0.68 0.52 - 1.04 mg/dL Final     GFR Estimate   Date Value Ref Range Status   11/08/2021 83 >60 mL/min/1.73m2 Final     Comment:     As of July 11, 2021, eGFR is calculated by the CKD-EPI creatinine equation, without race adjustment. eGFR can be influenced by muscle mass, exercise, and diet. The reported eGFR is an estimation only and is only applicable if the renal function is stable.   11/16/2020 80 >60 mL/min/[1.73_m2] Final     Comment:     Non  GFR Calc  Starting 12/18/2018, serum creatinine based estimated GFR (eGFR) will be   calculated using the Chronic Kidney Disease Epidemiology Collaboration   (CKD-EPI) equation.       Calcium   Date Value Ref Range Status   11/08/2021 9.5 8.5 - 10.1 mg/dL Final   11/16/2020 9.4 8.5 - 10.1 mg/dL Final     Bilirubin Total   Date Value Ref Range Status   11/08/2021 0.6 0.2 - 1.3 mg/dL Final   11/16/2020 0.8 0.2 - 1.3 mg/dL Final     Alkaline Phosphatase   Date Value Ref Range Status   11/08/2021 75 40 - 150 U/L Final   11/16/2020 79 40 - 150 U/L Final     ALT   Date Value Ref Range Status   11/08/2021 20 0 - 50 U/L Final   11/16/2020 18 0 - 50 U/L Final     AST   Date Value Ref Range Status   11/08/2021 24 0 - 45 U/L Final   11/16/2020 17 0 - 45 U/L Final     CA 27-29 = 19    PATHOLOGY:  7/15/22: Stomach and esophageal biopsy:  A(1).  Stomach, biopsy:  -Transitional and oxyntic type gastric mucosa with mild chronic inflammation.  - Negative for H. Pylori organisms on routine stains.  - Negative for intestinal metaplasia.   -Negative for dysplasia or malignancy.  -Separate single fragment of intestinal type mucosa with prominent surface gastric type mucin (see comment).  -Negative for dysplasia or malignancy        B(2).   Esophagus, distal, biopsy:  -Squamous mucosa with reactive epithelial changes of the type seen in reflux esophagitis.  -Adjacent gastric columnar mucosa with chronic inflammation.  -Negative for intestinal metaplasia.  -Negative for acute or eosinophilic esophagitis.  -Negative for dysplasia or malignancy.        C(3).  Esophagus, middle, biopsy:  -Squamous mucosa with no significant pathological changes.  -Negative for intestinal metaplasia.  -Negative for acute or eosinophilic esophagitis.  -Negative for dysplasia or malignancy.    IMAGIN2022: Mammogram showed benign findings.    ASSESSMENT/PLAN:  Audra Hayden is a 87 year old female with the following issues:  1. Stage IA, [id number], grade 2 invasive ductal carcinoma of right breast, ER/WI positive, HER-2/maya FISH negative, Oncotype DX  = 17  --Audra was diagnosed in  and is s/p right lumpectomy, radiation, and 5 years of hormone blockade therapy, completed in 2017.  --Discussed with Audra that she has no clinical evidence for recurrent breast cancer by physical exam from today or mammogram reviewed from 22.  --Continue annual mammograms.  --Discussed that as per NCCN and ASCO guidelines, that routine labs are not recommended as they have not been shown to improve overall survival in breast cancer surveillance.  I would only recommend lab and/or imaging workup as clinically indicated.    2. Osteopenia  --Seen on 11/10/2016 DEXA scan. I advised she see her primary care provider for repeat DEXA scan.    3. Vaginal atrophy  4. Intermittent urinary tract infections  --I advised use of vaginal estrogen cream -- apply pea size amount once daily for 2 weeks followed by twice weekly maintenance. Discussed that beneficial changes may not be felt for at least 4-6 weeks.  --Discussed that as per ACOG, use of the minimal amount of vaginal estrogen cream does not substantially increase the risk of breast cancer recurrence and as such, can be utilized in  patients who have had breast cancer.    5. Intermittent emesis  --7/2022 EGD was negative.  --Follow-up with [facility].    Return in 1 year.    Gladys Asif MD  Hematology/Oncology  [facility]    Total time spent: 20 minutes in patient evaluation, counseling, documentation, and coordination of care.

## 2022-12-07 ENCOUNTER — ONCOLOGY VISIT (OUTPATIENT)
Dept: ONCOLOGY | Facility: CLINIC | Age: 87
End: 2022-12-07
Attending: INTERNAL MEDICINE
Payer: COMMERCIAL

## 2022-12-07 VITALS
BODY MASS INDEX: 21 KG/M2 | SYSTOLIC BLOOD PRESSURE: 136 MMHG | HEART RATE: 66 BPM | HEIGHT: 64 IN | DIASTOLIC BLOOD PRESSURE: 66 MMHG | WEIGHT: 123 LBS | OXYGEN SATURATION: 99 % | RESPIRATION RATE: 16 BRPM

## 2022-12-07 DIAGNOSIS — Z85.3 PERSONAL HISTORY OF MALIGNANT NEOPLASM OF BREAST: Primary | ICD-10-CM

## 2022-12-07 DIAGNOSIS — N95.2 VAGINAL ATROPHY: ICD-10-CM

## 2022-12-07 DIAGNOSIS — Z12.31 ENCOUNTER FOR SCREENING MAMMOGRAM FOR BREAST CANCER: ICD-10-CM

## 2022-12-07 PROCEDURE — G0463 HOSPITAL OUTPT CLINIC VISIT: HCPCS

## 2022-12-07 PROCEDURE — 99213 OFFICE O/P EST LOW 20 MIN: CPT | Performed by: INTERNAL MEDICINE

## 2022-12-07 ASSESSMENT — PAIN SCALES - GENERAL: PAINLEVEL: NO PAIN (0)

## 2022-12-07 NOTE — LETTER
12/7/2022         RE: Audra Hayden  [address] [facility] [address]        Dear Colleague,    Thank you for referring your patient, Audra Hayden, to the [facility]. Please see a copy of my visit note below.    [facility]    Hematology/Oncology Established Patient Follow-up Note      Today's Date: 12/7/2022    Reason for Follow-up: Right breast cancer.    HISTORY OF PRESENT ILLNESS: Audra Hayden is a 87 year old female who presents with the following oncologic history:  --6/2012: Diagnosed at age 77 with right breast cancer -- 1.1 cm on imaging.  --6/28/2012: Biopsy of right breast mass showed an ER/NY positive grade 2 invasive ductal carcinoma.   --7/13/2012: Undewent right lumpectomy and sentinel lymph node biopsy. Pathology showed an invasive ductal carcinoma, 1.3 cm, grade 2, margins negative, negative sentinel lymph nodes. Staging was pT1c pN0. HER2/maya by FISH was negative. Oncotype RS = 17, low risk.   --10/1/2012: Completed adjuvant radiation therapy to the right breast.  --10/8/2012: Started letrozole.  --8/22/2012: Switched to anastrozole due to bony pain.  --4/2014: Switched to tamoxifen.  --11/2017: Completion of hormone blockade therapy due to age, endometrial polyps/thickening.  --8/2018: Underwent hysterectomy, oohprectomy and salphingoophrectomy.    INTERIM HISTORY:  Audra reports no new breast complaints and no new pain. She continues to have intermittent emesis every few weeks.    REVIEW OF SYSTEMS:   14 point ROS was reviewed and is negative other than as noted above in HPI.       HOME MEDICATIONS:  Current Outpatient Medications   Medication Sig Dispense Refill     Calcium Carbonate-Vitamin D (CALCIUM + D PO) Take 1 capsule by mouth daily        Cholecalciferol (VITAMIN D) 1000 UNITS capsule Take 1,000 Units by mouth daily        cholestyramine (QUESTRAN) 4 g packet Take 1 (one) Packet twice daily as instructed       estradiol  (ESTRACE) 0.1 MG/GM vaginal cream Place 2 g vaginally twice a week after initial application once daily for 2 weeks 42.5 g 3     MAGNESIUM OXIDE PO Take 2 tablets by mouth daily as needed for leg cramps        MECLIZINE HCL PO Take 25 mg by mouth 3 times daily as needed for dizziness        Naproxen Sodium (ALEVE PO) Take 220 mg by mouth 2 times daily as needed for moderate pain For headaches        omeprazole (PRILOSEC) 20 MG DR capsule Take 1 capsule (20 mg) by mouth 2 times daily 120 capsule 0     Potassium Chloride Chantell ER (K-DUR PO) Take 20 mEq by mouth 2 times daily       triamterene-hydrochlorothiazide (DYAZIDE) 37.5-25 MG per capsule TAKE 1 CAPSULE BY MOUTH DAILY. 90 capsule 0     verapamil (VERELAN) 240 MG CP24 TAKE 1 CAPSULE (240 MG) BY ORAL ROUTE ONCE DAILY 90 capsule 0         ALLERGIES:  Allergies   Allergen Reactions     Contrast Dye Hives and Swelling     Throat swelling     Other [No Clinical Screening - See Comments]      Pt has trouble with some anesthetics - not sure which ones     Zofran [Ondansetron Hcl-Dextrose]      Tremors, involuntary movements, diptonia     Sulfa Drugs Rash         PAST MEDICAL HISTORY:  Past Medical History:   Diagnosis Date     Breast cancer (H) 2012    lumpectomy plus radiation     Hyperlipidemia LDL goal < 130      Hypertension goal BP (blood pressure) < 140/90      Hypokalemia      Ophthalmic migraine      Osteoporosis      PONV (postoperative nausea and vomiting)      Skin cancer      Thickened endometrium          PAST SURGICAL HISTORY:  Past Surgical History:   Procedure Laterality Date     APPENDECTOMY       ARTHROSCOPY KNEE RT/LT       BASAL CELL       BIOPSY BREAST NEEDLE LOCALIZATION, BIOPSY NODE SENTINEL, COMBINED  7/13/2012    Procedure: COMBINED BIOPSY BREAST NEEDLE LOCALIZATION, BIOPSY NODE SENTINEL;  RIGHT BREAST LUMPECTOMY WITH SENTINEL NODE BIOPSY, WIRE LOCALIZATION;  Surgeon: Benji Schwab MD;  Location: [facility]     COLONOSCOPY       CT  TEMPOROMANDIBULAR JOINTS W/O CONTRAST       CYSTOSCOPY N/A 8/30/2018    Procedure: CYSTOSCOPY;;  Surgeon: Clary Hernandez MD;  Location: [facility]     DILATION AND CURETTAGE, OPERATIVE HYSTEROSCOPY WITH MORCELLATOR, COMBINED N/A 2/17/2017    Procedure: COMBINED DILATION AND CURETTAGE, OPERATIVE HYSTEROSCOPY WITH MORCELLATOR;  Surgeon: Clary Hernandez MD;  Location: [facility]     DISCECTOMY LUMBAR ANTERIOR       ESOPHAGOSCOPY, GASTROSCOPY, DUODENOSCOPY (EGD), COMBINED N/A 7/15/2022    Procedure: ESOPHAGOGASTRODUODENOSCOPY, WITH BIOPSY;  Surgeon: Uli Mcgee MD;  Location:  GI     FOOT SURGERY      LEFT     HC BLEPHAROPLASTY UPPER LID W EXCESS SKIN       LAPAROSCOPIC ASSISTED HYSTERECTOMY VAGINAL, BILATERAL SALPINGO-OOPHORECTOMY, COMBINED Bilateral 8/30/2018    Procedure: COMBINED LAPAROSCOPIC ASSISTED HYSTERECTOMY VAGINAL, SALPINGO-OOPHORECTOMY;  COMBINED LAPAROSCOPIC ASSISTED HYSTERECTOMY VAGINAL, BILATERAL SALPINGO-OOPHORECTOMY ; PELVIC WASHINGS CYSTO;  Surgeon: Clary Hernandez MD;  Location: [facility]     LUMPECTOMY BREAST  7/2012    brest cancer     MOHS MICROGRAPHIC PROCEDURE       OPERATIVE HYSTEROSCOPY WITH MORCELLATOR N/A 4/16/2015    Procedure: OPERATIVE HYSTEROSCOPY WITH MORCELLATOR (MYOSURE);  Surgeon: Aziza Sanchez MD;  Location: [facility]     SINUS SURGERY           SOCIAL HISTORY:  Social History     Socioeconomic History     Marital status:      Spouse name: Not on file     Number of children: Not on file     Years of education: Not on file     Highest education level: Not on file   Occupational History     Not on file   Tobacco Use     Smoking status: Never     Smokeless tobacco: Never   Substance and Sexual Activity     Alcohol use: No     Drug use: No     Sexual activity: Not Currently     Partners: Male   Other Topics Concern     Parent/sibling w/ CABG, MI or angioplasty before 65F 55M? Not Asked   Social History Narrative     Not on file     Social Determinants of Health  "    Financial Resource Strain: Not on file   Food Insecurity: Not on file   Transportation Needs: Not on file   Physical Activity: Not on file   Stress: Not on file   Social Connections: Not on file   Intimate Partner Violence: Not on file   Housing Stability: Not on file         FAMILY HISTORY:  Family History   Problem Relation Age of Onset     Breast Cancer Sister          PHYSICAL EXAM:  Vital signs:  /66   Pulse 66   Resp 16   Ht 1.626 m (5' 4\")   Wt 55.8 kg (123 lb)   SpO2 99%   BMI 21.11 kg/m     GENERAL/CONSTITUTIONAL: No acute distress.  EYES: No erythema or scleral icterus.  LYMPH: No cervical, supraclavicular, axillary adenopathy.   BREAST: No palpable masses in either breast with exception of firm lumpectomy scar tissue at right outer breast, stable compared to prior exam. Nipples are everted bilaterally with no discharge. No erythema, ulceration, or dimpling of the skin.  RESPIRATORY: No audible cough or wheezing.   GASTROINTESTINAL: No hepatosplenomegaly, masses, or tenderness. No guarding.  No distention.  MUSCULOSKELETAL: Warm and well-perfused, no cyanosis, clubbing, or edema.  NEUROLOGIC: Alert, oriented, answers questions appropriately.  INTEGUMENTARY: No rashes or jaundice.  GAIT: Steady, does not use assistive device      LABS:  CBC RESULTS: Recent Labs   Lab Test 11/08/21  0857   WBC 5.3   RBC 4.48   HGB 13.1   HCT 40.8   MCV 91   MCH 29.2   MCHC 32.1   RDW 14.2        Last Comprehensive Metabolic Panel:  Sodium   Date Value Ref Range Status   11/08/2021 140 133 - 144 mmol/L Final   11/16/2020 137 133 - 144 mmol/L Final     Potassium   Date Value Ref Range Status   11/08/2021 4.1 3.4 - 5.3 mmol/L Final   11/16/2020 3.7 3.4 - 5.3 mmol/L Final     Chloride   Date Value Ref Range Status   11/08/2021 106 94 - 109 mmol/L Final   11/16/2020 104 94 - 109 mmol/L Final     Carbon Dioxide   Date Value Ref Range Status   11/16/2020 28 20 - 32 mmol/L Final     Carbon Dioxide (CO2)   Date " Value Ref Range Status   11/08/2021 31 20 - 32 mmol/L Final     Anion Gap   Date Value Ref Range Status   11/08/2021 3 3 - 14 mmol/L Final   11/16/2020 5 3 - 14 mmol/L Final     Glucose   Date Value Ref Range Status   11/08/2021 89 70 - 99 mg/dL Final   11/16/2020 94 70 - 99 mg/dL Final     Urea Nitrogen   Date Value Ref Range Status   11/08/2021 16 7 - 30 mg/dL Final   11/16/2020 17 7 - 30 mg/dL Final     Creatinine   Date Value Ref Range Status   11/08/2021 0.60 0.52 - 1.04 mg/dL Final   11/16/2020 0.68 0.52 - 1.04 mg/dL Final     GFR Estimate   Date Value Ref Range Status   11/08/2021 83 >60 mL/min/1.73m2 Final     Comment:     As of July 11, 2021, eGFR is calculated by the CKD-EPI creatinine equation, without race adjustment. eGFR can be influenced by muscle mass, exercise, and diet. The reported eGFR is an estimation only and is only applicable if the renal function is stable.   11/16/2020 80 >60 mL/min/[1.73_m2] Final     Comment:     Non  GFR Calc  Starting 12/18/2018, serum creatinine based estimated GFR (eGFR) will be   calculated using the Chronic Kidney Disease Epidemiology Collaboration   (CKD-EPI) equation.       Calcium   Date Value Ref Range Status   11/08/2021 9.5 8.5 - 10.1 mg/dL Final   11/16/2020 9.4 8.5 - 10.1 mg/dL Final     Bilirubin Total   Date Value Ref Range Status   11/08/2021 0.6 0.2 - 1.3 mg/dL Final   11/16/2020 0.8 0.2 - 1.3 mg/dL Final     Alkaline Phosphatase   Date Value Ref Range Status   11/08/2021 75 40 - 150 U/L Final   11/16/2020 79 40 - 150 U/L Final     ALT   Date Value Ref Range Status   11/08/2021 20 0 - 50 U/L Final   11/16/2020 18 0 - 50 U/L Final     AST   Date Value Ref Range Status   11/08/2021 24 0 - 45 U/L Final   11/16/2020 17 0 - 45 U/L Final     CA 27-29 = 19    PATHOLOGY:  7/15/22: Stomach and esophageal biopsy:  A(1).  Stomach, biopsy:  -Transitional and oxyntic type gastric mucosa with mild chronic inflammation.  - Negative for H. Pylori  organisms on routine stains.  - Negative for intestinal metaplasia.   -Negative for dysplasia or malignancy.  -Separate single fragment of intestinal type mucosa with prominent surface gastric type mucin (see comment).  -Negative for dysplasia or malignancy        B(2).  Esophagus, distal, biopsy:  -Squamous mucosa with reactive epithelial changes of the type seen in reflux esophagitis.  -Adjacent gastric columnar mucosa with chronic inflammation.  -Negative for intestinal metaplasia.  -Negative for acute or eosinophilic esophagitis.  -Negative for dysplasia or malignancy.        C(3).  Esophagus, middle, biopsy:  -Squamous mucosa with no significant pathological changes.  -Negative for intestinal metaplasia.  -Negative for acute or eosinophilic esophagitis.  -Negative for dysplasia or malignancy.    IMAGIN2022: Mammogram showed benign findings.    ASSESSMENT/PLAN:  Audra Hayden is a 87 year old female with the following issues:  1. Stage IA, [id number], grade 2 invasive ductal carcinoma of right breast, ER/MS positive, HER-2/maya FISH negative, Oncotype DX  = 17  --Audra was diagnosed in  and is s/p right lumpectomy, radiation, and 5 years of hormone blockade therapy, completed in 2017.  --Discussed with Audra that she has no clinical evidence for recurrent breast cancer by physical exam from today or mammogram reviewed from 22.  --Continue annual mammograms.  --Discussed that as per NCCN and ASCO guidelines, that routine labs are not recommended as they have not been shown to improve overall survival in breast cancer surveillance.  I would only recommend lab and/or imaging workup as clinically indicated.    2. Osteopenia  --Seen on 11/10/2016 DEXA scan. I advised she see her primary care provider for repeat DEXA scan.    3. Vaginal atrophy  4. Intermittent urinary tract infections  --I advised use of vaginal estrogen cream -- apply pea size amount once daily for 2 weeks followed by twice weekly  "maintenance. Discussed that beneficial changes may not be felt for at least 4-6 weeks.  --Discussed that as per ACOG, use of the minimal amount of vaginal estrogen cream does not substantially increase the risk of breast cancer recurrence and as such, can be utilized in patients who have had breast cancer.    5. Intermittent emesis  --7/2022 EGD was negative.  --Follow-up with [place].    Return in 1 year.    Gladys Asif MD  Hematology/Oncology  [facility]    Total time spent: 20 minutes in patient evaluation, counseling, documentation, and coordination of care.      Oncology Rooming Note    December 7, 2022 9:00 AM   Audra Hayden is a 87 year old female who presents for:    Chief Complaint   Patient presents with     Oncology Clinic Visit     Initial Vitals: There were no vitals taken for this visit. Estimated body mass index is 21.63 kg/m  as calculated from the following:    Height as of 7/15/22: 1.626 m (5' 4\").    Weight as of 7/15/22: 57.2 kg (126 lb). There is no height or weight on file to calculate BSA.  Data Unavailable Comment: Data Unavailable   No LMP recorded. Patient is postmenopausal.  Allergies reviewed: Yes  Medications reviewed: Yes    Medications: Medication refills not needed today.  Pharmacy name entered into [employer]:    [id number]  [place], [place] - [address] [facility] [place]  [place] - [address] [address]    Clinical concerns:  doctor was notified.      Cristel Gutierrez MA                Again, thank you for allowing me to participate in the care of your patient.        Sincerely,        Gladys Asif MD    "

## 2022-12-07 NOTE — PROGRESS NOTES
"Oncology Rooming Note    December 7, 2022 9:00 AM   Audra Hayden is a 87 year old female who presents for:    Chief Complaint   Patient presents with     Oncology Clinic Visit     Initial Vitals: There were no vitals taken for this visit. Estimated body mass index is 21.63 kg/m  as calculated from the following:    Height as of 7/15/22: 1.626 m (5' 4\").    Weight as of 7/15/22: 57.2 kg (126 lb). There is no height or weight on file to calculate BSA.  Data Unavailable Comment: Data Unavailable   No LMP recorded. Patient is postmenopausal.  Allergies reviewed: Yes  Medications reviewed: Yes    Medications: Medication refills not needed today.  Pharmacy name entered into [email or web address]:    [id number] - [place], [place] - [address] [facility] [place]  [place] - [address] [place] [address]    Clinical concerns:  doctor was notified.      Cristel Gutierrez MA            "

## 2023-04-01 ENCOUNTER — HEALTH MAINTENANCE LETTER (OUTPATIENT)
Age: 88
End: 2023-04-01

## 2023-05-30 ENCOUNTER — TELEPHONE (OUTPATIENT)
Dept: ONCOLOGY | Facility: CLINIC | Age: 88
End: 2023-05-30
Payer: COMMERCIAL

## 2023-05-30 DIAGNOSIS — N63.10 MASS OF RIGHT BREAST, UNSPECIFIED QUADRANT: Primary | ICD-10-CM

## 2023-05-30 DIAGNOSIS — Z85.3 PERSONAL HISTORY OF MALIGNANT NEOPLASM OF BREAST: ICD-10-CM

## 2023-05-30 NOTE — TELEPHONE ENCOUNTER
Audra called to schedule mammo for Aug 2023 but mentions she is having some right breast tenderness and feels a new lump.     Routed to Dr Asif to see if we should change screening mammo to diagnostic with US and have her seen next available vs August, or if she would prefer to see Audra for exam first.    Adrienne Crocker RN

## 2023-06-07 ENCOUNTER — TELEPHONE (OUTPATIENT)
Dept: ONCOLOGY | Facility: CLINIC | Age: 88
End: 2023-06-07
Payer: COMMERCIAL

## 2023-06-07 NOTE — TELEPHONE ENCOUNTER
Audra calling to get diagnostic mammo/Us scheduled. Order placed on 5/30/23.    Routed to scheduling    Adrienne Crocker RN

## 2023-06-08 ENCOUNTER — TELEPHONE (OUTPATIENT)
Dept: ONCOLOGY | Facility: CLINIC | Age: 88
End: 2023-06-08
Payer: COMMERCIAL

## 2023-06-08 NOTE — TELEPHONE ENCOUNTER
Left voicemail message for patient requesting a return call regarding scheduling a mammogram and return visit with Dr Asif.

## 2023-06-13 ENCOUNTER — HOSPITAL ENCOUNTER (OUTPATIENT)
Dept: MAMMOGRAPHY | Facility: CLINIC | Age: 88
Discharge: HOME | End: 2023-06-13
Attending: INTERNAL MEDICINE
Payer: COMMERCIAL

## 2023-06-13 DIAGNOSIS — N63.10 MASS OF RIGHT BREAST, UNSPECIFIED QUADRANT: ICD-10-CM

## 2023-06-13 DIAGNOSIS — Z85.3 PERSONAL HISTORY OF MALIGNANT NEOPLASM OF BREAST: ICD-10-CM

## 2023-06-13 PROCEDURE — 76642 ULTRASOUND BREAST LIMITED: CPT | Mod: RT

## 2023-06-13 PROCEDURE — 77061 BREAST TOMOSYNTHESIS UNI: CPT | Mod: RT

## 2023-09-21 ENCOUNTER — HOSPITAL ENCOUNTER (OUTPATIENT)
Dept: MAMMOGRAPHY | Facility: CLINIC | Age: 88
Discharge: HOME | End: 2023-09-21
Attending: INTERNAL MEDICINE | Admitting: INTERNAL MEDICINE
Payer: COMMERCIAL

## 2023-09-21 DIAGNOSIS — Z85.3 PERSONAL HISTORY OF MALIGNANT NEOPLASM OF BREAST: ICD-10-CM

## 2023-09-21 DIAGNOSIS — Z12.31 ENCOUNTER FOR SCREENING MAMMOGRAM FOR BREAST CANCER: ICD-10-CM

## 2023-09-21 PROCEDURE — 77067 SCR MAMMO BI INCL CAD: CPT

## 2023-10-24 ENCOUNTER — MEDICAL CORRESPONDENCE (OUTPATIENT)
Dept: HEALTH INFORMATION MANAGEMENT | Facility: CLINIC | Age: 88
End: 2023-10-24
Payer: COMMERCIAL

## 2023-10-30 ENCOUNTER — HOSPITAL ENCOUNTER (OUTPATIENT)
Dept: CT IMAGING | Facility: CLINIC | Age: 88
Discharge: HOME | End: 2023-10-30
Attending: PHYSICIAN ASSISTANT | Admitting: PHYSICIAN ASSISTANT
Payer: COMMERCIAL

## 2023-10-30 DIAGNOSIS — R52 GENERALIZED PAIN: ICD-10-CM

## 2023-10-30 DIAGNOSIS — R94.31 NONSPECIFIC ABNORMAL ELECTROCARDIOGRAM (ECG) (EKG): ICD-10-CM

## 2023-10-30 DIAGNOSIS — R53.1 WEAKNESS: ICD-10-CM

## 2023-10-30 DIAGNOSIS — R63.0 LOSS OF APPETITE: ICD-10-CM

## 2023-10-30 PROCEDURE — 71250 CT THORAX DX C-: CPT

## 2023-10-31 LAB — RADIOLOGIST FLAGS: NORMAL

## 2023-11-03 ENCOUNTER — ANCILLARY PROCEDURE (OUTPATIENT)
Dept: ULTRASOUND IMAGING | Facility: CLINIC | Age: 88
End: 2023-11-03
Attending: PHYSICIAN ASSISTANT
Payer: COMMERCIAL

## 2023-11-03 DIAGNOSIS — E04.1 NONTOXIC UNINODULAR GOITER: ICD-10-CM

## 2023-11-03 PROCEDURE — 76536 US EXAM OF HEAD AND NECK: CPT

## 2023-12-01 NOTE — PROGRESS NOTES
[facility]    Hematology/Oncology Established Patient Follow-up Note      Today's Date: 12/7/2023    Reason for Follow-up: Right breast cancer.    HISTORY OF PRESENT ILLNESS: Audra Hayden is a 88 year old female who presents with the following oncologic history:  --6/2012: Diagnosed at age 77 with right breast cancer -- 1.1 cm on imaging.  --6/28/2012: Biopsy of right breast mass showed an ER/IA positive grade 2 invasive ductal carcinoma.   --7/13/2012: Undewent right lumpectomy and sentinel lymph node biopsy. Pathology showed an invasive ductal carcinoma, 1.3 cm, grade 2, margins negative, negative sentinel lymph nodes. Staging was pT1c pN0. HER2/maya by FISH was negative. Oncotype RS = 17, low risk.   --10/1/2012: Completed adjuvant radiation therapy to the right breast.  --10/8/2012: Started letrozole.  --8/22/2012: Switched to anastrozole due to bony pain.  --4/2014: Switched to tamoxifen.  --11/2017: Completion of hormone blockade therapy due to age, endometrial polyps/thickening.  --8/2018: Underwent hysterectomy, oohprectomy and salphingoophrectomy.    INTERIM HISTORY:  Audra reports she had been having unintentional weight loss, decreased appetite and intake, and chronic fatigue.  So far, workup has not shown any specific etiology. She denies new breast complaints or pain. Intermittent emesis has now resolved.    REVIEW OF SYSTEMS:   14 point ROS was reviewed and is negative other than as noted above in HPI.       HOME MEDICATIONS:  Current Outpatient Medications   Medication Sig Dispense Refill    Calcium Carbonate-Vitamin D (CALCIUM + D PO) Take 1 capsule by mouth daily       Cholecalciferol (VITAMIN D) 1000 UNITS capsule Take 1,000 Units by mouth daily       cholestyramine (QUESTRAN) 4 g packet Take 1 (one) Packet twice daily as instructed      estradiol (ESTRACE) 0.1 MG/GM vaginal cream Place 2 g vaginally twice a week after initial application once daily for 2 weeks 42.5 g 3     MAGNESIUM OXIDE PO Take 2 tablets by mouth daily as needed for leg cramps       MECLIZINE HCL PO Take 25 mg by mouth 3 times daily as needed for dizziness       Naproxen Sodium (ALEVE PO) Take 220 mg by mouth 2 times daily as needed for moderate pain For headaches       omeprazole (PRILOSEC) 20 MG DR capsule Take 1 capsule (20 mg) by mouth 2 times daily 120 capsule 0    Potassium Chloride Chantell ER (K-DUR PO) Take 20 mEq by mouth 2 times daily      triamterene-hydrochlorothiazide (DYAZIDE) 37.5-25 MG per capsule TAKE 1 CAPSULE BY MOUTH DAILY. 90 capsule 0    verapamil (VERELAN) 240 MG CP24 TAKE 1 CAPSULE (240 MG) BY ORAL ROUTE ONCE DAILY 90 capsule 0         ALLERGIES:  Allergies   Allergen Reactions    Contrast Dye Hives and Swelling     Throat swelling    Other [No Clinical Screening - See Comments]      Pt has trouble with some anesthetics - not sure which ones    Zofran [Ondansetron Hcl-Dextrose]      Tremors, involuntary movements, diptonia    Sulfa Antibiotics Rash         PAST MEDICAL HISTORY:  Past Medical History:   Diagnosis Date    Breast cancer (H) 2012    lumpectomy plus radiation    Hyperlipidemia LDL goal < 130     Hypertension goal BP (blood pressure) < 140/90     Hypokalemia     Ophthalmic migraine     Osteoporosis     PONV (postoperative nausea and vomiting)     Skin cancer     Thickened endometrium          PAST SURGICAL HISTORY:  Past Surgical History:   Procedure Laterality Date    APPENDECTOMY      ARTHROSCOPY KNEE RT/LT      BASAL CELL      BIOPSY BREAST NEEDLE LOCALIZATION, BIOPSY NODE SENTINEL, COMBINED  7/13/2012    Procedure: COMBINED BIOPSY BREAST NEEDLE LOCALIZATION, BIOPSY NODE SENTINEL;  RIGHT BREAST LUMPECTOMY WITH SENTINEL NODE BIOPSY, WIRE LOCALIZATION;  Surgeon: Benji Schwab MD;  Location: [facility]    COLONOSCOPY      CT TEMPOROMANDIBULAR JOINTS W/O CONTRAST      CYSTOSCOPY N/A 8/30/2018    Procedure: CYSTOSCOPY;;  Surgeon: Clary Hernandez MD;  Location: [facility]    DILATION AND  CURETTAGE, OPERATIVE HYSTEROSCOPY WITH MORCELLATOR, COMBINED N/A 2/17/2017    Procedure: COMBINED DILATION AND CURETTAGE, OPERATIVE HYSTEROSCOPY WITH MORCELLATOR;  Surgeon: Clary Hernandez MD;  Location: [facility]    DISCECTOMY LUMBAR ANTERIOR      ESOPHAGOSCOPY, GASTROSCOPY, DUODENOSCOPY (EGD), COMBINED N/A 7/15/2022    Procedure: ESOPHAGOGASTRODUODENOSCOPY, WITH BIOPSY;  Surgeon: Uli Mcgee MD;  Location:  GI    FOOT SURGERY      LEFT    HC BLEPHAROPLASTY UPPER LID W EXCESS SKIN      LAPAROSCOPIC ASSISTED HYSTERECTOMY VAGINAL, BILATERAL SALPINGO-OOPHORECTOMY, COMBINED Bilateral 8/30/2018    Procedure: COMBINED LAPAROSCOPIC ASSISTED HYSTERECTOMY VAGINAL, SALPINGO-OOPHORECTOMY;  COMBINED LAPAROSCOPIC ASSISTED HYSTERECTOMY VAGINAL, BILATERAL SALPINGO-OOPHORECTOMY ; PELVIC WASHINGS CYSTO;  Surgeon: Clary Hernandez MD;  Location: [facility]    LUMPECTOMY BREAST  7/2012    brest cancer    MOHS MICROGRAPHIC PROCEDURE      OPERATIVE HYSTEROSCOPY WITH MORCELLATOR N/A 4/16/2015    Procedure: OPERATIVE HYSTEROSCOPY WITH MORCELLATOR (MYOSURE);  Surgeon: Aziza Sanchez MD;  Location: [facility]    SINUS SURGERY           SOCIAL HISTORY:  Social History     Socioeconomic History    Marital status:      Spouse name: Not on file    Number of children: Not on file    Years of education: Not on file    Highest education level: Not on file   Occupational History    Not on file   Tobacco Use    Smoking status: Never    Smokeless tobacco: Never   Substance and Sexual Activity    Alcohol use: No    Drug use: No    Sexual activity: Not Currently     Partners: Male   Other Topics Concern    Parent/sibling w/ CABG, MI or angioplasty before 65F 55M? Not Asked   Social History Narrative    Not on file     Social Determinants of Health     Financial Resource Strain: Not on file   Food Insecurity: Not on file   Transportation Needs: Not on file   Physical Activity: Not on file   Stress: Not on file   Social Connections:  Not on file   Interpersonal Safety: Not on file   Housing Stability: Not on file         FAMILY HISTORY:  Family History   Problem Relation Age of Onset    Breast Cancer Sister          PHYSICAL EXAM:  Vital signs:  /78   Pulse 85   Temp 97.4  F (36.3  C) (Oral)   Resp 18   Wt 53.9 kg (118 lb 12.8 oz)   SpO2 98%   BMI 20.39 kg/m     GENERAL/CONSTITUTIONAL: No acute distress.  EYES: No erythema or scleral icterus.  LYMPH: No cervical, supraclavicular, axillary adenopathy.   BREAST: No palpable masses in either breast with exception of firm lumpectomy scar tissue at right outer breast, stable compared to prior exam. Nipples are everted bilaterally with no discharge. No erythema, ulceration, or dimpling of the skin.  RESPIRATORY: No audible cough or wheezing.   GASTROINTESTINAL: No hepatosplenomegaly, masses, or tenderness. No guarding.  No distention.  MUSCULOSKELETAL: Warm and well-perfused, no cyanosis, clubbing, or edema.  NEUROLOGIC: Alert, oriented, answers questions appropriately.  INTEGUMENTARY: No rashes or jaundice.  GAIT: Steady, does not use assistive device      LABS:  CBC RESULTS: Recent Labs   Lab Test 11/08/21  0857   WBC 5.3   RBC 4.48   HGB 13.1   HCT 40.8   MCV 91   MCH 29.2   MCHC 32.1   RDW 14.2        Last Comprehensive Metabolic Panel:  Sodium   Date Value Ref Range Status   11/08/2021 140 133 - 144 mmol/L Final   11/16/2020 137 133 - 144 mmol/L Final     Potassium   Date Value Ref Range Status   11/08/2021 4.1 3.4 - 5.3 mmol/L Final   11/16/2020 3.7 3.4 - 5.3 mmol/L Final     Chloride   Date Value Ref Range Status   11/08/2021 106 94 - 109 mmol/L Final   11/16/2020 104 94 - 109 mmol/L Final     Carbon Dioxide   Date Value Ref Range Status   11/16/2020 28 20 - 32 mmol/L Final     Carbon Dioxide (CO2)   Date Value Ref Range Status   11/08/2021 31 20 - 32 mmol/L Final     Anion Gap   Date Value Ref Range Status   11/08/2021 3 3 - 14 mmol/L Final   11/16/2020 5 3 - 14 mmol/L Final      Glucose   Date Value Ref Range Status   2021 89 70 - 99 mg/dL Final   2020 94 70 - 99 mg/dL Final     Urea Nitrogen   Date Value Ref Range Status   2021 16 7 - 30 mg/dL Final   2020 17 7 - 30 mg/dL Final     Creatinine   Date Value Ref Range Status   2021 0.60 0.52 - 1.04 mg/dL Final   2020 0.68 0.52 - 1.04 mg/dL Final     GFR Estimate   Date Value Ref Range Status   2021 83 >60 mL/min/1.73m2 Final     Comment:     As of 2021, eGFR is calculated by the CKD-EPI creatinine equation, without race adjustment. eGFR can be influenced by muscle mass, exercise, and diet. The reported eGFR is an estimation only and is only applicable if the renal function is stable.   2020 80 >60 mL/min/[1.73_m2] Final     Comment:     Non  GFR Calc  Starting 2018, serum creatinine based estimated GFR (eGFR) will be   calculated using the Chronic Kidney Disease Epidemiology Collaboration   (CKD-EPI) equation.       Calcium   Date Value Ref Range Status   2021 9.5 8.5 - 10.1 mg/dL Final   2020 9.4 8.5 - 10.1 mg/dL Final     Bilirubin Total   Date Value Ref Range Status   2021 0.6 0.2 - 1.3 mg/dL Final   2020 0.8 0.2 - 1.3 mg/dL Final     Alkaline Phosphatase   Date Value Ref Range Status   2021 75 40 - 150 U/L Final   2020 79 40 - 150 U/L Final     ALT   Date Value Ref Range Status   2021 20 0 - 50 U/L Final   2020 18 0 - 50 U/L Final     AST   Date Value Ref Range Status   2021 24 0 - 45 U/L Final   2020 17 0 - 45 U/L Final     CA 27-29 = 19    PATHOLOGY:  None new.    IMAGIN2023: Mammogram showed benign findings.    ASSESSMENT/PLAN:  Audra Hayden is an 88 year old female with the following issues:  1. Stage IA, [id number], grade 2 invasive ductal carcinoma of right breast, ER/DE positive, HER-2/maya FISH negative, Oncotype DX  = 17  --Audra was diagnosed in  and is s/p right lumpectomy,  radiation, and 5 years of hormone blockade therapy, completed in 11/2017.  --Discussed with Audra that she has no clinical evidence for recurrent breast cancer by physical exam from today or mammogram reviewed from 9/21/2023.  --Continue annual mammograms.    2. Osteopenia  --Seen on 11/10/2016 DEXA scan. Future repeat DEXA scan as per PCP.    3. Vaginal atrophy  4. Intermittent urinary tract infections  --Continue vaginal estrogen cream twice weekly maintenance.   --As per ACOG, use of the minimal amount of vaginal estrogen cream does not substantially increase the risk of breast cancer recurrence and as such, can be utilized in patients who have had breast cancer.    5. Indeterminate pulmonary nodules  --Noted on 10/30/2023 CT C/A/P.  --Can repeat 6-month interval chest CT in 4/2024. Her PCP will order.    6. Decreased appetite and weight loss  --Reviewed 10/30/2023 CT C/A/P showed indeterminate pulmonary nodules and no definite evidence of malignancy.    Return in 1 year.    Gladys Asif MD  Hematology/Oncology  [facility]    Total time spent today: 30 minutes in chart review, patient evaluation, counseling, documentation, test and/or medication/prescription orders, and coordination of care.

## 2023-12-07 ENCOUNTER — ONCOLOGY VISIT (OUTPATIENT)
Dept: ONCOLOGY | Facility: CLINIC | Age: 88
End: 2023-12-07
Attending: INTERNAL MEDICINE
Payer: COMMERCIAL

## 2023-12-07 VITALS
RESPIRATION RATE: 18 BRPM | DIASTOLIC BLOOD PRESSURE: 78 MMHG | TEMPERATURE: 97.4 F | OXYGEN SATURATION: 98 % | HEART RATE: 85 BPM | SYSTOLIC BLOOD PRESSURE: 126 MMHG | WEIGHT: 118.8 LBS | BODY MASS INDEX: 20.39 KG/M2

## 2023-12-07 DIAGNOSIS — R63.0 DECREASED APPETITE: ICD-10-CM

## 2023-12-07 DIAGNOSIS — R91.8 PULMONARY NODULES: ICD-10-CM

## 2023-12-07 DIAGNOSIS — N95.2 VAGINAL ATROPHY: ICD-10-CM

## 2023-12-07 DIAGNOSIS — Z85.3 PERSONAL HISTORY OF MALIGNANT NEOPLASM OF BREAST: Primary | ICD-10-CM

## 2023-12-07 DIAGNOSIS — Z12.31 ENCOUNTER FOR SCREENING MAMMOGRAM FOR BREAST CANCER: ICD-10-CM

## 2023-12-07 PROCEDURE — 99214 OFFICE O/P EST MOD 30 MIN: CPT | Performed by: INTERNAL MEDICINE

## 2023-12-07 PROCEDURE — G0463 HOSPITAL OUTPT CLINIC VISIT: HCPCS | Performed by: INTERNAL MEDICINE

## 2023-12-07 ASSESSMENT — PAIN SCALES - GENERAL: PAINLEVEL: NO PAIN (0)

## 2023-12-07 NOTE — PROGRESS NOTES
"Oncology Rooming Note    December 7, 2023 8:01 AM   Audra Hayden is a 88 year old female who presents for:    Chief Complaint   Patient presents with    Oncology Clinic Visit     Initial Vitals: /78   Pulse 85   Temp 97.4  F (36.3  C) (Oral)   Resp 18   Wt 53.9 kg (118 lb 12.8 oz)   SpO2 98%   BMI 20.39 kg/m   Estimated body mass index is 20.39 kg/m  as calculated from the following:    Height as of 12/7/22: 1.626 m (5' 4\").    Weight as of this encounter: 53.9 kg (118 lb 12.8 oz). Body surface area is 1.56 meters squared.  No Pain (0) Comment: Data Unavailable   No LMP recorded. Patient is postmenopausal.  Allergies reviewed: Yes  Medications reviewed: Yes    Medications: Medication refills not needed today.  Pharmacy name entered into [employer]:    [id number] - [place], [place] - [address] [facility] [place] [address] [id number]    Clinical concerns: no       Shari J. Schoenberger, CMA              "

## 2023-12-07 NOTE — LETTER
12/7/2023         RE: Audra Hayden  [address]  [facility] [place] [facility] [place] [address]        Dear Colleague,    Thank you for referring your patient, Audra Hayden, to the [facility]. Please see a copy of my visit note below.    [facility]    Hematology/Oncology Established Patient Follow-up Note      Today's Date: 12/7/2023    Reason for Follow-up: Right breast cancer.    HISTORY OF PRESENT ILLNESS: Audra Hayden is a 88 year old female who presents with the following oncologic history:  --6/2012: Diagnosed at age 77 with right breast cancer -- 1.1 cm on imaging.  --6/28/2012: Biopsy of right breast mass showed an ER/SC positive grade 2 invasive ductal carcinoma.   --7/13/2012: Undewent right lumpectomy and sentinel lymph node biopsy. Pathology showed an invasive ductal carcinoma, 1.3 cm, grade 2, margins negative, negative sentinel lymph nodes. Staging was pT1c pN0. HER2/maya by FISH was negative. Oncotype RS = 17, low risk.   --10/1/2012: Completed adjuvant radiation therapy to the right breast.  --10/8/2012: Started letrozole.  --8/22/2012: Switched to anastrozole due to bony pain.  --4/2014: Switched to tamoxifen.  --11/2017: Completion of hormone blockade therapy due to age, endometrial polyps/thickening.  --8/2018: Underwent hysterectomy, oohprectomy and salphingoophrectomy.    INTERIM HISTORY:  Audra reports she had been having unintentional weight loss, decreased appetite and intake, and chronic fatigue.  So far, workup has not shown any specific etiology. She denies new breast complaints or pain. Intermittent emesis has now resolved.    REVIEW OF SYSTEMS:   14 point ROS was reviewed and is negative other than as noted above in HPI.       HOME MEDICATIONS:  Current Outpatient Medications   Medication Sig Dispense Refill     Calcium Carbonate-Vitamin D (CALCIUM + D PO) Take 1 capsule by mouth daily        Cholecalciferol (VITAMIN D) 1000 UNITS capsule Take 1,000  Units by mouth daily        cholestyramine (QUESTRAN) 4 g packet Take 1 (one) Packet twice daily as instructed       estradiol (ESTRACE) 0.1 MG/GM vaginal cream Place 2 g vaginally twice a week after initial application once daily for 2 weeks 42.5 g 3     MAGNESIUM OXIDE PO Take 2 tablets by mouth daily as needed for leg cramps        MECLIZINE HCL PO Take 25 mg by mouth 3 times daily as needed for dizziness        Naproxen Sodium (ALEVE PO) Take 220 mg by mouth 2 times daily as needed for moderate pain For headaches        omeprazole (PRILOSEC) 20 MG DR capsule Take 1 capsule (20 mg) by mouth 2 times daily 120 capsule 0     Potassium Chloride Chantell ER (K-DUR PO) Take 20 mEq by mouth 2 times daily       triamterene-hydrochlorothiazide (DYAZIDE) 37.5-25 MG per capsule TAKE 1 CAPSULE BY MOUTH DAILY. 90 capsule 0     verapamil (VERELAN) 240 MG CP24 TAKE 1 CAPSULE (240 MG) BY ORAL ROUTE ONCE DAILY 90 capsule 0         ALLERGIES:  Allergies   Allergen Reactions     Contrast Dye Hives and Swelling     Throat swelling     Other [No Clinical Screening - See Comments]      Pt has trouble with some anesthetics - not sure which ones     Zofran [Ondansetron Hcl-Dextrose]      Tremors, involuntary movements, diptonia     Sulfa Antibiotics Rash         PAST MEDICAL HISTORY:  Past Medical History:   Diagnosis Date     Breast cancer (H) 2012    lumpectomy plus radiation     Hyperlipidemia LDL goal < 130      Hypertension goal BP (blood pressure) < 140/90      Hypokalemia      Ophthalmic migraine      Osteoporosis      PONV (postoperative nausea and vomiting)      Skin cancer      Thickened endometrium          PAST SURGICAL HISTORY:  Past Surgical History:   Procedure Laterality Date     APPENDECTOMY       ARTHROSCOPY KNEE RT/LT       BASAL CELL       BIOPSY BREAST NEEDLE LOCALIZATION, BIOPSY NODE SENTINEL, COMBINED  7/13/2012    Procedure: COMBINED BIOPSY BREAST NEEDLE LOCALIZATION, BIOPSY NODE SENTINEL;  RIGHT BREAST LUMPECTOMY  WITH SENTINEL NODE BIOPSY, WIRE LOCALIZATION;  Surgeon: Benji Schwab MD;  Location: [facility]     COLONOSCOPY       CT TEMPOROMANDIBULAR JOINTS W/O CONTRAST       CYSTOSCOPY N/A 8/30/2018    Procedure: CYSTOSCOPY;;  Surgeon: Clary Hernandez MD;  Location: [facility]     DILATION AND CURETTAGE, OPERATIVE HYSTEROSCOPY WITH MORCELLATOR, COMBINED N/A 2/17/2017    Procedure: COMBINED DILATION AND CURETTAGE, OPERATIVE HYSTEROSCOPY WITH MORCELLATOR;  Surgeon: Clary Hernandez MD;  Location: [facility]     DISCECTOMY LUMBAR ANTERIOR       ESOPHAGOSCOPY, GASTROSCOPY, DUODENOSCOPY (EGD), COMBINED N/A 7/15/2022    Procedure: ESOPHAGOGASTRODUODENOSCOPY, WITH BIOPSY;  Surgeon: Uli Mcgee MD;  Location:  GI     FOOT SURGERY      LEFT     HC BLEPHAROPLASTY UPPER LID W EXCESS SKIN       LAPAROSCOPIC ASSISTED HYSTERECTOMY VAGINAL, BILATERAL SALPINGO-OOPHORECTOMY, COMBINED Bilateral 8/30/2018    Procedure: COMBINED LAPAROSCOPIC ASSISTED HYSTERECTOMY VAGINAL, SALPINGO-OOPHORECTOMY;  COMBINED LAPAROSCOPIC ASSISTED HYSTERECTOMY VAGINAL, BILATERAL SALPINGO-OOPHORECTOMY ; PELVIC WASHINGS CYSTO;  Surgeon: Clary Hernandez MD;  Location: [facility]     LUMPECTOMY BREAST  7/2012    brest cancer     MOHS MICROGRAPHIC PROCEDURE       OPERATIVE HYSTEROSCOPY WITH MORCELLATOR N/A 4/16/2015    Procedure: OPERATIVE HYSTEROSCOPY WITH MORCELLATOR (MYOSURE);  Surgeon: Aziza Sanchez MD;  Location: [facility]     SINUS SURGERY           SOCIAL HISTORY:  Social History     Socioeconomic History     Marital status:      Spouse name: Not on file     Number of children: Not on file     Years of education: Not on file     Highest education level: Not on file   Occupational History     Not on file   Tobacco Use     Smoking status: Never     Smokeless tobacco: Never   Substance and Sexual Activity     Alcohol use: No     Drug use: No     Sexual activity: Not Currently     Partners: Male   Other Topics Concern     Parent/sibling w/ CABG, MI  or angioplasty before 65F 55M? Not Asked   Social History Narrative     Not on file     Social Determinants of Health     Financial Resource Strain: Not on file   Food Insecurity: Not on file   Transportation Needs: Not on file   Physical Activity: Not on file   Stress: Not on file   Social Connections: Not on file   Interpersonal Safety: Not on file   Housing Stability: Not on file         FAMILY HISTORY:  Family History   Problem Relation Age of Onset     Breast Cancer Sister          PHYSICAL EXAM:  Vital signs:  /78   Pulse 85   Temp 97.4  F (36.3  C) (Oral)   Resp 18   Wt 53.9 kg (118 lb 12.8 oz)   SpO2 98%   BMI 20.39 kg/m     GENERAL/CONSTITUTIONAL: No acute distress.  EYES: No erythema or scleral icterus.  LYMPH: No cervical, supraclavicular, axillary adenopathy.   BREAST: No palpable masses in either breast with exception of firm lumpectomy scar tissue at right outer breast, stable compared to prior exam. Nipples are everted bilaterally with no discharge. No erythema, ulceration, or dimpling of the skin.  RESPIRATORY: No audible cough or wheezing.   GASTROINTESTINAL: No hepatosplenomegaly, masses, or tenderness. No guarding.  No distention.  MUSCULOSKELETAL: Warm and well-perfused, no cyanosis, clubbing, or edema.  NEUROLOGIC: Alert, oriented, answers questions appropriately.  INTEGUMENTARY: No rashes or jaundice.  GAIT: Steady, does not use assistive device      LABS:  CBC RESULTS: Recent Labs   Lab Test 11/08/21  0857   WBC 5.3   RBC 4.48   HGB 13.1   HCT 40.8   MCV 91   MCH 29.2   MCHC 32.1   RDW 14.2        Last Comprehensive Metabolic Panel:  Sodium   Date Value Ref Range Status   11/08/2021 140 133 - 144 mmol/L Final   11/16/2020 137 133 - 144 mmol/L Final     Potassium   Date Value Ref Range Status   11/08/2021 4.1 3.4 - 5.3 mmol/L Final   11/16/2020 3.7 3.4 - 5.3 mmol/L Final     Chloride   Date Value Ref Range Status   11/08/2021 106 94 - 109 mmol/L Final   11/16/2020 104 94 -  109 mmol/L Final     Carbon Dioxide   Date Value Ref Range Status   2020 28 20 - 32 mmol/L Final     Carbon Dioxide (CO2)   Date Value Ref Range Status   2021 31 20 - 32 mmol/L Final     Anion Gap   Date Value Ref Range Status   2021 3 3 - 14 mmol/L Final   2020 5 3 - 14 mmol/L Final     Glucose   Date Value Ref Range Status   2021 89 70 - 99 mg/dL Final   2020 94 70 - 99 mg/dL Final     Urea Nitrogen   Date Value Ref Range Status   2021 16 7 - 30 mg/dL Final   2020 17 7 - 30 mg/dL Final     Creatinine   Date Value Ref Range Status   2021 0.60 0.52 - 1.04 mg/dL Final   2020 0.68 0.52 - 1.04 mg/dL Final     GFR Estimate   Date Value Ref Range Status   2021 83 >60 mL/min/1.73m2 Final     Comment:     As of 2021, eGFR is calculated by the CKD-EPI creatinine equation, without race adjustment. eGFR can be influenced by muscle mass, exercise, and diet. The reported eGFR is an estimation only and is only applicable if the renal function is stable.   2020 80 >60 mL/min/[1.73_m2] Final     Comment:     Non  GFR Calc  Starting 2018, serum creatinine based estimated GFR (eGFR) will be   calculated using the Chronic Kidney Disease Epidemiology Collaboration   (CKD-EPI) equation.       Calcium   Date Value Ref Range Status   2021 9.5 8.5 - 10.1 mg/dL Final   2020 9.4 8.5 - 10.1 mg/dL Final     Bilirubin Total   Date Value Ref Range Status   2021 0.6 0.2 - 1.3 mg/dL Final   2020 0.8 0.2 - 1.3 mg/dL Final     Alkaline Phosphatase   Date Value Ref Range Status   2021 75 40 - 150 U/L Final   2020 79 40 - 150 U/L Final     ALT   Date Value Ref Range Status   2021 20 0 - 50 U/L Final   2020 18 0 - 50 U/L Final     AST   Date Value Ref Range Status   2021 24 0 - 45 U/L Final   2020 17 0 - 45 U/L Final     CA 27-29 = 19    PATHOLOGY:  None new.    IMAGIN2023:  Mammogram showed benign findings.    ASSESSMENT/PLAN:  Audra Hayden is an 88 year old female with the following issues:  1. Stage IA, [id number], grade 2 invasive ductal carcinoma of right breast, ER/SD positive, HER-2/maya FISH negative, Oncotype DX  = 17  --Audra was diagnosed in 2012 and is s/p right lumpectomy, radiation, and 5 years of hormone blockade therapy, completed in 11/2017.  --Discussed with Audra that she has no clinical evidence for recurrent breast cancer by physical exam from today or mammogram reviewed from 9/21/2023.  --Continue annual mammograms.    2. Osteopenia  --Seen on 11/10/2016 DEXA scan. Future repeat DEXA scan as per PCP.    3. Vaginal atrophy  4. Intermittent urinary tract infections  --Continue vaginal estrogen cream twice weekly maintenance.   --As per ACOG, use of the minimal amount of vaginal estrogen cream does not substantially increase the risk of breast cancer recurrence and as such, can be utilized in patients who have had breast cancer.    5. Indeterminate pulmonary nodules  --Noted on 10/30/2023 CT C/A/P.  --Can repeat 6-month interval chest CT in 4/2024. Her PCP will order.    6. Decreased appetite and weight loss  --Reviewed 10/30/2023 CT C/A/P showed indeterminate pulmonary nodules and no definite evidence of malignancy.    Return in 1 year.    Gladys Asif MD  Hematology/Oncology  [facility]    Total time spent today: 30 minutes in chart review, patient evaluation, counseling, documentation, test and/or medication/prescription orders, and coordination of care.       Oncology Rooming Note    December 7, 2023 8:01 AM   Audra Hayden is a 88 year old female who presents for:    Chief Complaint   Patient presents with     Oncology Clinic Visit     Initial Vitals: /78   Pulse 85   Temp 97.4  F (36.3  C) (Oral)   Resp 18   Wt 53.9 kg (118 lb 12.8 oz)   SpO2 98%   BMI 20.39 kg/m   Estimated body mass index is 20.39 kg/m  as calculated from  "the following:    Height as of 12/7/22: 1.626 m (5' 4\").    Weight as of this encounter: 53.9 kg (118 lb 12.8 oz). Body surface area is 1.56 meters squared.  No Pain (0) Comment: Data Unavailable   No LMP recorded. Patient is postmenopausal.  Allergies reviewed: Yes  Medications reviewed: Yes    Medications: Medication refills not needed today.  Pharmacy name entered into [facility]:    [id number], [place] - [address] [facility], [place] - [address] [address]    Clinical concerns: no       Shari J. Schoenberger, [facility]                Again, thank you for allowing me to participate in the care of your patient.        Sincerely,        Gladys Asif MD  "

## 2024-01-10 ENCOUNTER — HOSPITAL ENCOUNTER (OUTPATIENT)
Dept: ULTRASOUND IMAGING | Facility: CLINIC | Age: 89
Discharge: HOME | End: 2024-01-10
Attending: INTERNAL MEDICINE | Admitting: INTERNAL MEDICINE
Payer: COMMERCIAL

## 2024-01-10 DIAGNOSIS — E04.1 THYROID NODULE: ICD-10-CM

## 2024-01-10 PROCEDURE — 272N000431 US BIOPSY THYROID FINE NEEDLE ASPIRATION

## 2024-01-10 PROCEDURE — 88173 CYTOPATH EVAL FNA REPORT: CPT | Mod: TC | Performed by: INTERNAL MEDICINE

## 2024-01-10 PROCEDURE — 250N000009 HC RX 250: Performed by: INTERNAL MEDICINE

## 2024-01-10 RX ORDER — LIDOCAINE HYDROCHLORIDE 10 MG/ML
10 INJECTION, SOLUTION EPIDURAL; INFILTRATION; INTRACAUDAL; PERINEURAL ONCE
Status: COMPLETED | OUTPATIENT
Start: 2024-01-10 | End: 2024-01-10

## 2024-01-10 RX ADMIN — LIDOCAINE HYDROCHLORIDE 5 ML: 10 INJECTION, SOLUTION EPIDURAL; INFILTRATION; INTRACAUDAL; PERINEURAL at 14:19

## 2024-01-12 LAB
PATH REPORT.COMMENTS IMP SPEC: NORMAL
PATH REPORT.COMMENTS IMP SPEC: NORMAL
PATH REPORT.FINAL DX SPEC: NORMAL
PATH REPORT.GROSS SPEC: NORMAL
PATH REPORT.MICROSCOPIC SPEC OTHER STN: NORMAL
PATH REPORT.RELEVANT HX SPEC: NORMAL

## 2024-01-12 PROCEDURE — 88173 CYTOPATH EVAL FNA REPORT: CPT | Mod: 26 | Performed by: PATHOLOGY

## 2024-01-16 ENCOUNTER — HOSPITAL ENCOUNTER (OUTPATIENT)
Dept: BONE DENSITY | Facility: CLINIC | Age: 89
Discharge: HOME | End: 2024-01-16
Attending: INTERNAL MEDICINE | Admitting: INTERNAL MEDICINE
Payer: COMMERCIAL

## 2024-01-16 DIAGNOSIS — M81.0 OSTEOPOROSIS: ICD-10-CM

## 2024-01-16 DIAGNOSIS — M85.80 OSTEOPENIA: ICD-10-CM

## 2024-01-16 PROCEDURE — 77080 DXA BONE DENSITY AXIAL: CPT

## 2024-04-11 ENCOUNTER — MEDICAL CORRESPONDENCE (OUTPATIENT)
Dept: HEALTH INFORMATION MANAGEMENT | Facility: CLINIC | Age: 89
End: 2024-04-11
Payer: COMMERCIAL

## 2024-04-18 ENCOUNTER — ANCILLARY PROCEDURE (OUTPATIENT)
Dept: CT IMAGING | Facility: CLINIC | Age: 89
End: 2024-04-18
Attending: FAMILY MEDICINE
Payer: COMMERCIAL

## 2024-04-18 DIAGNOSIS — R91.8 PULMONARY NODULES: ICD-10-CM

## 2024-04-18 PROCEDURE — 71250 CT THORAX DX C-: CPT

## 2024-04-22 DIAGNOSIS — E78.00 HIGH CHOLESTEROL: ICD-10-CM

## 2024-04-22 DIAGNOSIS — R06.02 EXERTIONAL SHORTNESS OF BREATH: Primary | ICD-10-CM

## 2024-04-22 DIAGNOSIS — I25.10 MILD CORONARY ARTERY DISEASE: ICD-10-CM

## 2024-05-01 ENCOUNTER — HOSPITAL ENCOUNTER (OUTPATIENT)
Dept: CARDIOLOGY | Facility: CLINIC | Age: 89
Discharge: HOME | End: 2024-05-01
Attending: FAMILY MEDICINE
Payer: COMMERCIAL

## 2024-05-01 VITALS
OXYGEN SATURATION: 98 % | WEIGHT: 124 LBS | DIASTOLIC BLOOD PRESSURE: 60 MMHG | BODY MASS INDEX: 21.17 KG/M2 | SYSTOLIC BLOOD PRESSURE: 142 MMHG | HEIGHT: 64 IN

## 2024-05-01 DIAGNOSIS — R06.02 EXERTIONAL SHORTNESS OF BREATH: ICD-10-CM

## 2024-05-01 DIAGNOSIS — E78.00 HIGH CHOLESTEROL: ICD-10-CM

## 2024-05-01 DIAGNOSIS — I25.10 MILD CORONARY ARTERY DISEASE: ICD-10-CM

## 2024-05-01 LAB
CV STRESS MAX HR HE: 141
NUC STRESS EJECTION FRACTION: 62 %
RATE PRESSURE PRODUCT: NORMAL
STRESS ANGINA INDEX: 1
STRESS ECHO BASELINE DIASTOLIC HE: 62
STRESS ECHO BASELINE HR: 73 BPM
STRESS ECHO BASELINE SYSTOLIC BP: 128
STRESS ECHO CALCULATED PERCENT HR: 107 %
STRESS ECHO LAST STRESS DIASTOLIC BP: 72
STRESS ECHO LAST STRESS SYSTOLIC BP: 182
STRESS ECHO POST ESTIMATED WORKLOAD: 7 METS
STRESS ECHO POST EXERCISE DUR MIN: 10 MIN
STRESS ECHO POST EXERCISE DUR SEC: 0 SEC
STRESS ECHO TARGET HR: 132

## 2024-05-01 PROCEDURE — 78452 HT MUSCLE IMAGE SPECT MULT: CPT

## 2024-05-01 PROCEDURE — 93018 CV STRESS TEST I&R ONLY: CPT | Performed by: INTERNAL MEDICINE

## 2024-05-01 PROCEDURE — A9502 TC99M TETROFOSMIN: HCPCS | Performed by: FAMILY MEDICINE

## 2024-05-01 PROCEDURE — 78452 HT MUSCLE IMAGE SPECT MULT: CPT | Mod: 26 | Performed by: INTERNAL MEDICINE

## 2024-05-01 PROCEDURE — 93016 CV STRESS TEST SUPVJ ONLY: CPT | Performed by: INTERNAL MEDICINE

## 2024-05-01 PROCEDURE — 343N000001 HC RX 343: Performed by: FAMILY MEDICINE

## 2024-05-01 RX ADMIN — TETROFOSMIN 3.07 MILLICURIE: 1.38 INJECTION, POWDER, LYOPHILIZED, FOR SOLUTION INTRAVENOUS at 08:56

## 2024-05-01 RX ADMIN — TETROFOSMIN 9.26 MILLICURIE: 1.38 INJECTION, POWDER, LYOPHILIZED, FOR SOLUTION INTRAVENOUS at 10:27

## 2024-06-01 ENCOUNTER — HEALTH MAINTENANCE LETTER (OUTPATIENT)
Age: 89
End: 2024-06-01

## 2024-09-13 NOTE — PROGRESS NOTES
[facility]    Hematology/Oncology Established Patient Follow-up Note      Today's Date: 9/26/2024    Reason for Follow-up: Right breast cancer.    HISTORY OF PRESENT ILLNESS: Audra Hayden is a 89 year old female who presents with the following oncologic history:  --6/2012: Diagnosed at age 77 with right breast cancer -- 1.1 cm on imaging.  --6/28/2012: Biopsy of right breast mass showed an ER/MA positive grade 2 invasive ductal carcinoma.   --7/13/2012: Undewent right lumpectomy and sentinel lymph node biopsy. Pathology showed an invasive ductal carcinoma, 1.3 cm, grade 2, margins negative, negative sentinel lymph nodes. Staging was pT1c pN0. HER2/maya by FISH was negative. Oncotype RS = 17, low risk.   --10/1/2012: Completed adjuvant radiation therapy to the right breast.  --10/8/2012: Started letrozole.  --8/22/2012: Switched to anastrozole due to bony pain.  --4/2014: Switched to tamoxifen.  --11/2017: Completion of hormone blockade therapy due to age, endometrial polyps/thickening.  --8/2018: Underwent hysterectomy, oohprectomy and salphingoophrectomy.    INTERVAL HISTORY:  Audra reports improvement in her fatigue and appetite.  She denies new breast complaints or pain.     REVIEW OF SYSTEMS:   14 point ROS was reviewed and is negative other than as noted above in HPI.       HOME MEDICATIONS:  Current Outpatient Medications   Medication Sig Dispense Refill    Calcium Carbonate-Vitamin D (CALCIUM + D PO) Take 1 capsule by mouth daily       Cholecalciferol (VITAMIN D) 1000 UNITS capsule Take 1,000 Units by mouth daily       cholestyramine (QUESTRAN) 4 g packet Take 1 (one) Packet twice daily as instructed      estradiol (ESTRACE) 0.1 MG/GM vaginal cream Place 2 g vaginally twice a week after initial application once daily for 2 weeks 42.5 g 3    MECLIZINE HCL PO Take 25 mg by mouth 3 times daily as needed for dizziness       Naproxen Sodium (ALEVE PO) Take 220 mg by mouth 2 times daily as needed  for moderate pain For headaches       omeprazole (PRILOSEC) 20 MG DR capsule Take 1 capsule (20 mg) by mouth 2 times daily 120 capsule 0    Potassium Chloride Chantell ER (K-DUR PO) Take 20 mEq by mouth 2 times daily      triamterene-hydrochlorothiazide (DYAZIDE) 37.5-25 MG per capsule TAKE 1 CAPSULE BY MOUTH DAILY. 90 capsule 0    verapamil (VERELAN) 240 MG CP24 TAKE 1 CAPSULE (240 MG) BY ORAL ROUTE ONCE DAILY 90 capsule 0         ALLERGIES:  Allergies   Allergen Reactions    Contrast Dye Hives and Swelling     Throat swelling    Other [No Clinical Screening - See Comments]      Pt has trouble with some anesthetics - not sure which ones    Zofran [Ondansetron Hcl-Dextrose]      Tremors, involuntary movements, diptonia    Sulfa Antibiotics Rash         PAST MEDICAL HISTORY:  Past Medical History:   Diagnosis Date    Breast cancer (H) 2012    lumpectomy plus radiation    Hyperlipidemia LDL goal < 130     Hypertension goal BP (blood pressure) < 140/90     Hypokalemia     Ophthalmic migraine     Osteoporosis     PONV (postoperative nausea and vomiting)     Skin cancer     Thickened endometrium          PAST SURGICAL HISTORY:  Past Surgical History:   Procedure Laterality Date    APPENDECTOMY      ARTHROSCOPY KNEE RT/LT      BASAL CELL      BIOPSY BREAST NEEDLE LOCALIZATION, BIOPSY NODE SENTINEL, COMBINED  7/13/2012    Procedure: COMBINED BIOPSY BREAST NEEDLE LOCALIZATION, BIOPSY NODE SENTINEL;  RIGHT BREAST LUMPECTOMY WITH SENTINEL NODE BIOPSY, WIRE LOCALIZATION;  Surgeon: Benji Schwab MD;  Location: [facility]    COLONOSCOPY      CT TEMPOROMANDIBULAR JOINTS W/O CONTRAST      CYSTOSCOPY N/A 8/30/2018    Procedure: CYSTOSCOPY;;  Surgeon: Clary Hernandez MD;  Location: [facility]    DILATION AND CURETTAGE, OPERATIVE HYSTEROSCOPY WITH MORCELLATOR, COMBINED N/A 2/17/2017    Procedure: COMBINED DILATION AND CURETTAGE, OPERATIVE HYSTEROSCOPY WITH MORCELLATOR;  Surgeon: Clary Hernandez MD;  Location: [facility]    DISCECTOMY LUMBAR  ANTERIOR      ESOPHAGOSCOPY, GASTROSCOPY, DUODENOSCOPY (EGD), COMBINED N/A 7/15/2022    Procedure: ESOPHAGOGASTRODUODENOSCOPY, WITH BIOPSY;  Surgeon: Uli Mcgee MD;  Location:  GI    FOOT SURGERY      LEFT    HC BLEPHAROPLASTY UPPER LID W EXCESS SKIN      LAPAROSCOPIC ASSISTED HYSTERECTOMY VAGINAL, BILATERAL SALPINGO-OOPHORECTOMY, COMBINED Bilateral 8/30/2018    Procedure: COMBINED LAPAROSCOPIC ASSISTED HYSTERECTOMY VAGINAL, SALPINGO-OOPHORECTOMY;  COMBINED LAPAROSCOPIC ASSISTED HYSTERECTOMY VAGINAL, BILATERAL SALPINGO-OOPHORECTOMY ; PELVIC WASHINGS CYSTO;  Surgeon: Clary Hernandez MD;  Location: [facility]    LUMPECTOMY BREAST  7/2012    brest cancer    MOHS MICROGRAPHIC PROCEDURE      OPERATIVE HYSTEROSCOPY WITH MORCELLATOR N/A 4/16/2015    Procedure: OPERATIVE HYSTEROSCOPY WITH MORCELLATOR (MYOSURE);  Surgeon: Aziza Sanchez MD;  Location: [facility]    SINUS SURGERY           SOCIAL HISTORY:  Social History     Socioeconomic History    Marital status:      Spouse name: Not on file    Number of children: Not on file    Years of education: Not on file    Highest education level: Not on file   Occupational History    Not on file   Tobacco Use    Smoking status: Never    Smokeless tobacco: Never   Substance and Sexual Activity    Alcohol use: No    Drug use: No    Sexual activity: Not Currently     Partners: Male   Other Topics Concern    Parent/sibling w/ CABG, MI or angioplasty before 65F 55M? Not Asked   Social History Narrative    Not on file     Social Determinants of Health     Financial Resource Strain: Not on file   Food Insecurity: Not on file   Transportation Needs: Not on file   Physical Activity: Not on file   Stress: Not on file   Social Connections: Not on file   Interpersonal Safety: Not on file   Housing Stability: Not on file         FAMILY HISTORY:  Family History   Problem Relation Age of Onset    Breast Cancer Sister          PHYSICAL EXAM:  Vital signs:  /78    Pulse 81   Resp 16   Wt 56.6 kg (124 lb 12.8 oz)   SpO2 98%   BMI 21.42 kg/m     GENERAL/CONSTITUTIONAL: No acute distress.  EYES: No erythema or scleral icterus.  LYMPH: No cervical, supraclavicular, axillary adenopathy.   BREAST: No palpable masses in either breast with exception of firm lumpectomy scar tissue at right outer breast, stable compared to prior exam. Nipples are everted bilaterally with no discharge. No erythema, ulceration, or dimpling of the skin.  RESPIRATORY: No audible cough or wheezing.   GASTROINTESTINAL: No hepatosplenomegaly, masses, or tenderness. No guarding.  No distention.  MUSCULOSKELETAL: Warm and well-perfused, no cyanosis, clubbing, or edema.  NEUROLOGIC: Alert, oriented, answers questions appropriately.  INTEGUMENTARY: Central chest lesion that appears to be a basal cell carcinoma.  GAIT: Steady, does not use assistive device      LABS:  CBC RESULTS: Recent Labs   Lab Test 11/08/21  0857   WBC 5.3   RBC 4.48   HGB 13.1   HCT 40.8   MCV 91   MCH 29.2   MCHC 32.1   RDW 14.2        Last Comprehensive Metabolic Panel:  Sodium   Date Value Ref Range Status   11/08/2021 140 133 - 144 mmol/L Final   11/16/2020 137 133 - 144 mmol/L Final     Potassium   Date Value Ref Range Status   11/08/2021 4.1 3.4 - 5.3 mmol/L Final   11/16/2020 3.7 3.4 - 5.3 mmol/L Final     Chloride   Date Value Ref Range Status   11/08/2021 106 94 - 109 mmol/L Final   11/16/2020 104 94 - 109 mmol/L Final     Carbon Dioxide   Date Value Ref Range Status   11/16/2020 28 20 - 32 mmol/L Final     Carbon Dioxide (CO2)   Date Value Ref Range Status   11/08/2021 31 20 - 32 mmol/L Final     Anion Gap   Date Value Ref Range Status   11/08/2021 3 3 - 14 mmol/L Final   11/16/2020 5 3 - 14 mmol/L Final     Glucose   Date Value Ref Range Status   11/08/2021 89 70 - 99 mg/dL Final   11/16/2020 94 70 - 99 mg/dL Final     Urea Nitrogen   Date Value Ref Range Status   11/08/2021 16 7 - 30 mg/dL Final   11/16/2020 17 7 - 30  mg/dL Final     Creatinine   Date Value Ref Range Status   2021 0.60 0.52 - 1.04 mg/dL Final   2020 0.68 0.52 - 1.04 mg/dL Final     GFR Estimate   Date Value Ref Range Status   2021 83 >60 mL/min/1.73m2 Final     Comment:     As of 2021, eGFR is calculated by the CKD-EPI creatinine equation, without race adjustment. eGFR can be influenced by muscle mass, exercise, and diet. The reported eGFR is an estimation only and is only applicable if the renal function is stable.   2020 80 >60 mL/min/[1.73_m2] Final     Comment:     Non  GFR Calc  Starting 2018, serum creatinine based estimated GFR (eGFR) will be   calculated using the Chronic Kidney Disease Epidemiology Collaboration   (CKD-EPI) equation.       Calcium   Date Value Ref Range Status   2021 9.5 8.5 - 10.1 mg/dL Final   2020 9.4 8.5 - 10.1 mg/dL Final     Bilirubin Total   Date Value Ref Range Status   2021 0.6 0.2 - 1.3 mg/dL Final   2020 0.8 0.2 - 1.3 mg/dL Final     Alkaline Phosphatase   Date Value Ref Range Status   2021 75 40 - 150 U/L Final   2020 79 40 - 150 U/L Final     ALT   Date Value Ref Range Status   2021 20 0 - 50 U/L Final   2020 18 0 - 50 U/L Final     AST   Date Value Ref Range Status   2021 24 0 - 45 U/L Final   2020 17 0 - 45 U/L Final       PATHOLOGY:  None new.    IMAGIN2023: Mammogram showed benign findings.    ASSESSMENT/PLAN:  Audra Hayden is an 89 year old female with the following issues:  1. Stage IA, [id number]-L9, grade 2 invasive ductal carcinoma of right breast, ER/MO positive, HER-2/maya FISH negative, Oncotype DX  = 17  --Audra was diagnosed in  and is s/p right lumpectomy, radiation, and 5 years of hormone blockade therapy, completed in 2017.  --Discussed with Audra that she has no clinical evidence for recurrent breast cancer by physical exam from today or mammogram reviewed from  9/23/2024.  --Continue annual mammograms.    2. Osteopenia  --Seen on 11/10/2016 DEXA scan.   --Continue alendronate, calcium, vitamin D.  --Future repeat DEXA scan as per PCP.    3. Vaginal atrophy  4. Intermittent urinary tract infections  --Continue vaginal estrogen cream twice weekly maintenance.   --As per ACOG, use of the minimal amount of vaginal estrogen cream does not substantially increase the risk of breast cancer recurrence and as such, can be utilized in patients who have had breast cancer.    5. Indeterminate pulmonary nodules  --Noted on 10/30/2023 CT C/A/P.  --4/18/2024 Chest CT showed stable lung nodules.    6. Decreased appetite and weight loss  --10/30/2023 CT C/A/P showed indeterminate pulmonary nodules and no definite evidence of malignancy.  --Now improved with stabilization of weight.    7. Basal cell skin carcinoma  --Has new chest lesion.  Will have this removed by dermatology.    Return in 1 year.    Gladys Asif MD  [facility] Hematology/Oncology     Total time spent today: 30 minutes in chart review, patient evaluation, counseling, documentation, test and/or medication/prescription orders, and coordination of care.     The longitudinal plan of care for the diagnosis(es)/condition(s) as documented were addressed during this visit. Due to the added complexity in care, I will continue to support Audra in the subsequent management and with ongoing continuity of care.

## 2024-09-23 ENCOUNTER — HOSPITAL ENCOUNTER (OUTPATIENT)
Dept: MAMMOGRAPHY | Facility: CLINIC | Age: 89
Discharge: HOME | End: 2024-09-23
Attending: INTERNAL MEDICINE | Admitting: INTERNAL MEDICINE
Payer: COMMERCIAL

## 2024-09-23 DIAGNOSIS — Z12.31 ENCOUNTER FOR SCREENING MAMMOGRAM FOR BREAST CANCER: ICD-10-CM

## 2024-09-23 DIAGNOSIS — Z85.3 PERSONAL HISTORY OF MALIGNANT NEOPLASM OF BREAST: ICD-10-CM

## 2024-09-23 PROCEDURE — 77063 BREAST TOMOSYNTHESIS BI: CPT

## 2024-09-26 ENCOUNTER — ONCOLOGY VISIT (OUTPATIENT)
Dept: ONCOLOGY | Facility: CLINIC | Age: 89
End: 2024-09-26
Attending: INTERNAL MEDICINE
Payer: COMMERCIAL

## 2024-09-26 VITALS
BODY MASS INDEX: 21.42 KG/M2 | DIASTOLIC BLOOD PRESSURE: 78 MMHG | SYSTOLIC BLOOD PRESSURE: 134 MMHG | RESPIRATION RATE: 16 BRPM | HEART RATE: 81 BPM | WEIGHT: 124.8 LBS | OXYGEN SATURATION: 98 %

## 2024-09-26 DIAGNOSIS — Z85.3 PERSONAL HISTORY OF MALIGNANT NEOPLASM OF BREAST: Primary | ICD-10-CM

## 2024-09-26 DIAGNOSIS — R63.4 WEIGHT LOSS: ICD-10-CM

## 2024-09-26 DIAGNOSIS — Z12.31 ENCOUNTER FOR SCREENING MAMMOGRAM FOR BREAST CANCER: ICD-10-CM

## 2024-09-26 DIAGNOSIS — M85.89 OSTEOPENIA OF MULTIPLE SITES: ICD-10-CM

## 2024-09-26 PROCEDURE — G0463 HOSPITAL OUTPT CLINIC VISIT: HCPCS | Performed by: INTERNAL MEDICINE

## 2024-09-26 PROCEDURE — G2211 COMPLEX E/M VISIT ADD ON: HCPCS | Performed by: INTERNAL MEDICINE

## 2024-09-26 PROCEDURE — 99214 OFFICE O/P EST MOD 30 MIN: CPT | Performed by: INTERNAL MEDICINE

## 2024-09-26 RX ORDER — ALENDRONATE SODIUM 70 MG/1
TABLET ORAL
COMMUNITY
Start: 2024-08-05

## 2024-09-26 ASSESSMENT — PAIN SCALES - GENERAL: PAINLEVEL: NO PAIN (0)

## 2024-09-26 NOTE — LETTER
9/26/2024      Audra Hayden  [address] [facility] [address]      Dear Colleague,    Thank you for referring your patient, Adura Hayden, to the [facility]. Please see a copy of my visit note below.    [facility]    Hematology/Oncology Established Patient Follow-up Note      Today's Date: 9/26/2024    Reason for Follow-up: Right breast cancer.    HISTORY OF PRESENT ILLNESS: Audra Hayden is a 89 year old female who presents with the following oncologic history:  --6/2012: Diagnosed at age 77 with right breast cancer -- 1.1 cm on imaging.  --6/28/2012: Biopsy of right breast mass showed an ER/NC positive grade 2 invasive ductal carcinoma.   --7/13/2012: Undewent right lumpectomy and sentinel lymph node biopsy. Pathology showed an invasive ductal carcinoma, 1.3 cm, grade 2, margins negative, negative sentinel lymph nodes. Staging was pT1c pN0. HER2/maya by FISH was negative. Oncotype RS = 17, low risk.   --10/1/2012: Completed adjuvant radiation therapy to the right breast.  --10/8/2012: Started letrozole.  --8/22/2012: Switched to anastrozole due to bony pain.  --4/2014: Switched to tamoxifen.  --11/2017: Completion of hormone blockade therapy due to age, endometrial polyps/thickening.  --8/2018: Underwent hysterectomy, oohprectomy and salphingoophrectomy.    INTERVAL HISTORY:  Audra reports improvement in her fatigue and appetite.  She denies new breast complaints or pain.     REVIEW OF SYSTEMS:   14 point ROS was reviewed and is negative other than as noted above in HPI.       HOME MEDICATIONS:  Current Outpatient Medications   Medication Sig Dispense Refill     Calcium Carbonate-Vitamin D (CALCIUM + D PO) Take 1 capsule by mouth daily        Cholecalciferol (VITAMIN D) 1000 UNITS capsule Take 1,000 Units by mouth daily        cholestyramine (QUESTRAN) 4 g packet Take 1 (one) Packet twice daily as instructed       estradiol (ESTRACE) 0.1 MG/GM vaginal cream  Place 2 g vaginally twice a week after initial application once daily for 2 weeks 42.5 g 3     MECLIZINE HCL PO Take 25 mg by mouth 3 times daily as needed for dizziness        Naproxen Sodium (ALEVE PO) Take 220 mg by mouth 2 times daily as needed for moderate pain For headaches        omeprazole (PRILOSEC) 20 MG DR capsule Take 1 capsule (20 mg) by mouth 2 times daily 120 capsule 0     Potassium Chloride Chantell ER (K-DUR PO) Take 20 mEq by mouth 2 times daily       triamterene-hydrochlorothiazide (DYAZIDE) 37.5-25 MG per capsule TAKE 1 CAPSULE BY MOUTH DAILY. 90 capsule 0     verapamil (VERELAN) 240 MG CP24 TAKE 1 CAPSULE (240 MG) BY ORAL ROUTE ONCE DAILY 90 capsule 0         ALLERGIES:  Allergies   Allergen Reactions     Contrast Dye Hives and Swelling     Throat swelling     Other [No Clinical Screening - See Comments]      Pt has trouble with some anesthetics - not sure which ones     Zofran [Ondansetron Hcl-Dextrose]      Tremors, involuntary movements, diptonia     Sulfa Antibiotics Rash         PAST MEDICAL HISTORY:  Past Medical History:   Diagnosis Date     Breast cancer (H) 2012    lumpectomy plus radiation     Hyperlipidemia LDL goal < 130      Hypertension goal BP (blood pressure) < 140/90      Hypokalemia      Ophthalmic migraine      Osteoporosis      PONV (postoperative nausea and vomiting)      Skin cancer      Thickened endometrium          PAST SURGICAL HISTORY:  Past Surgical History:   Procedure Laterality Date     APPENDECTOMY       ARTHROSCOPY KNEE RT/LT       BASAL CELL       BIOPSY BREAST NEEDLE LOCALIZATION, BIOPSY NODE SENTINEL, COMBINED  7/13/2012    Procedure: COMBINED BIOPSY BREAST NEEDLE LOCALIZATION, BIOPSY NODE SENTINEL;  RIGHT BREAST LUMPECTOMY WITH SENTINEL NODE BIOPSY, WIRE LOCALIZATION;  Surgeon: Benji Schwab MD;  Location: [facility]     COLONOSCOPY       CT TEMPOROMANDIBULAR JOINTS W/O CONTRAST       CYSTOSCOPY N/A 8/30/2018    Procedure: CYSTOSCOPY;;  Surgeon: David  MD Clary;  Location: [facility]     DILATION AND CURETTAGE, OPERATIVE HYSTEROSCOPY WITH MORCELLATOR, COMBINED N/A 2/17/2017    Procedure: COMBINED DILATION AND CURETTAGE, OPERATIVE HYSTEROSCOPY WITH MORCELLATOR;  Surgeon: Clary Hernandez MD;  Location: [facility]     DISCECTOMY LUMBAR ANTERIOR       ESOPHAGOSCOPY, GASTROSCOPY, DUODENOSCOPY (EGD), COMBINED N/A 7/15/2022    Procedure: ESOPHAGOGASTRODUODENOSCOPY, WITH BIOPSY;  Surgeon: Uli Mcgee MD;  Location:  GI     FOOT SURGERY      LEFT     HC BLEPHAROPLASTY UPPER LID W EXCESS SKIN       LAPAROSCOPIC ASSISTED HYSTERECTOMY VAGINAL, BILATERAL SALPINGO-OOPHORECTOMY, COMBINED Bilateral 8/30/2018    Procedure: COMBINED LAPAROSCOPIC ASSISTED HYSTERECTOMY VAGINAL, SALPINGO-OOPHORECTOMY;  COMBINED LAPAROSCOPIC ASSISTED HYSTERECTOMY VAGINAL, BILATERAL SALPINGO-OOPHORECTOMY ; PELVIC WASHINGS CYSTO;  Surgeon: Clary Hernandez MD;  Location: [facility]     LUMPECTOMY BREAST  7/2012    brest cancer     MOHS MICROGRAPHIC PROCEDURE       OPERATIVE HYSTEROSCOPY WITH MORCELLATOR N/A 4/16/2015    Procedure: OPERATIVE HYSTEROSCOPY WITH MORCELLATOR (MYOSURE);  Surgeon: Azzia Sanchez MD;  Location: [facility]     SINUS SURGERY           SOCIAL HISTORY:  Social History     Socioeconomic History     Marital status:      Spouse name: Not on file     Number of children: Not on file     Years of education: Not on file     Highest education level: Not on file   Occupational History     Not on file   Tobacco Use     Smoking status: Never     Smokeless tobacco: Never   Substance and Sexual Activity     Alcohol use: No     Drug use: No     Sexual activity: Not Currently     Partners: Male   Other Topics Concern     Parent/sibling w/ CABG, MI or angioplasty before 65F 55M? Not Asked   Social History Narrative     Not on file     Social Determinants of Health     Financial Resource Strain: Not on file   Food Insecurity: Not on file   Transportation Needs: Not on file   Physical  Activity: Not on file   Stress: Not on file   Social Connections: Not on file   Interpersonal Safety: Not on file   Housing Stability: Not on file         FAMILY HISTORY:  Family History   Problem Relation Age of Onset     Breast Cancer Sister          PHYSICAL EXAM:  Vital signs:  /78   Pulse 81   Resp 16   Wt 56.6 kg (124 lb 12.8 oz)   SpO2 98%   BMI 21.42 kg/m     GENERAL/CONSTITUTIONAL: No acute distress.  EYES: No erythema or scleral icterus.  LYMPH: No cervical, supraclavicular, axillary adenopathy.   BREAST: No palpable masses in either breast with exception of firm lumpectomy scar tissue at right outer breast, stable compared to prior exam. Nipples are everted bilaterally with no discharge. No erythema, ulceration, or dimpling of the skin.  RESPIRATORY: No audible cough or wheezing.   GASTROINTESTINAL: No hepatosplenomegaly, masses, or tenderness. No guarding.  No distention.  MUSCULOSKELETAL: Warm and well-perfused, no cyanosis, clubbing, or edema.  NEUROLOGIC: Alert, oriented, answers questions appropriately.  INTEGUMENTARY: Central chest lesion that appears to be a basal cell carcinoma.  GAIT: Steady, does not use assistive device      LABS:  CBC RESULTS: Recent Labs   Lab Test 11/08/21  0857   WBC 5.3   RBC 4.48   HGB 13.1   HCT 40.8   MCV 91   MCH 29.2   MCHC 32.1   RDW 14.2        Last Comprehensive Metabolic Panel:  Sodium   Date Value Ref Range Status   11/08/2021 140 133 - 144 mmol/L Final   11/16/2020 137 133 - 144 mmol/L Final     Potassium   Date Value Ref Range Status   11/08/2021 4.1 3.4 - 5.3 mmol/L Final   11/16/2020 3.7 3.4 - 5.3 mmol/L Final     Chloride   Date Value Ref Range Status   11/08/2021 106 94 - 109 mmol/L Final   11/16/2020 104 94 - 109 mmol/L Final     Carbon Dioxide   Date Value Ref Range Status   11/16/2020 28 20 - 32 mmol/L Final     Carbon Dioxide (CO2)   Date Value Ref Range Status   11/08/2021 31 20 - 32 mmol/L Final     Anion Gap   Date Value Ref Range  Status   2021 3 3 - 14 mmol/L Final   2020 5 3 - 14 mmol/L Final     Glucose   Date Value Ref Range Status   2021 89 70 - 99 mg/dL Final   2020 94 70 - 99 mg/dL Final     Urea Nitrogen   Date Value Ref Range Status   2021 16 7 - 30 mg/dL Final   2020 17 7 - 30 mg/dL Final     Creatinine   Date Value Ref Range Status   2021 0.60 0.52 - 1.04 mg/dL Final   2020 0.68 0.52 - 1.04 mg/dL Final     GFR Estimate   Date Value Ref Range Status   2021 83 >60 mL/min/1.73m2 Final     Comment:     As of 2021, eGFR is calculated by the CKD-EPI creatinine equation, without race adjustment. eGFR can be influenced by muscle mass, exercise, and diet. The reported eGFR is an estimation only and is only applicable if the renal function is stable.   2020 80 >60 mL/min/[1.73_m2] Final     Comment:     Non  GFR Calc  Starting 2018, serum creatinine based estimated GFR (eGFR) will be   calculated using the Chronic Kidney Disease Epidemiology Collaboration   (CKD-EPI) equation.       Calcium   Date Value Ref Range Status   2021 9.5 8.5 - 10.1 mg/dL Final   2020 9.4 8.5 - 10.1 mg/dL Final     Bilirubin Total   Date Value Ref Range Status   2021 0.6 0.2 - 1.3 mg/dL Final   2020 0.8 0.2 - 1.3 mg/dL Final     Alkaline Phosphatase   Date Value Ref Range Status   2021 75 40 - 150 U/L Final   2020 79 40 - 150 U/L Final     ALT   Date Value Ref Range Status   2021 20 0 - 50 U/L Final   2020 18 0 - 50 U/L Final     AST   Date Value Ref Range Status   2021 24 0 - 45 U/L Final   2020 17 0 - 45 U/L Final       PATHOLOGY:  None new.    IMAGIN2023: Mammogram showed benign findings.    ASSESSMENT/PLAN:  Audra L Dahlk is an 89 year old female with the following issues:  1. Stage IA, [id number], grade 2 invasive ductal carcinoma of right breast, ER/MA positive, HER-2/maya FISH negative, Oncotype DX  =  17  --Audra was diagnosed in 2012 and is s/p right lumpectomy, radiation, and 5 years of hormone blockade therapy, completed in 11/2017.  --Discussed with Audra that she has no clinical evidence for recurrent breast cancer by physical exam from today or mammogram reviewed from 9/23/2024.  --Continue annual mammograms.    2. Osteopenia  --Seen on 11/10/2016 DEXA scan.   --Continue alendronate, calcium, vitamin D.  --Future repeat DEXA scan as per PCP.    3. Vaginal atrophy  4. Intermittent urinary tract infections  --Continue vaginal estrogen cream twice weekly maintenance.   --As per ACOG, use of the minimal amount of vaginal estrogen cream does not substantially increase the risk of breast cancer recurrence and as such, can be utilized in patients who have had breast cancer.    5. Indeterminate pulmonary nodules  --Noted on 10/30/2023 CT C/A/P.  --4/18/2024 Chest CT showed stable lung nodules.    6. Decreased appetite and weight loss  --10/30/2023 CT C/A/P showed indeterminate pulmonary nodules and no definite evidence of malignancy.  --Now improved with stabilization of weight.    7. Basal cell skin carcinoma  --Has new chest lesion.  Will have this removed by dermatology.    Return in 1 year.    Gladys Asif MD  [facility] Hematology/Oncology     Total time spent today: 30 minutes in chart review, patient evaluation, counseling, documentation, test and/or medication/prescription orders, and coordination of care.     The longitudinal plan of care for the diagnosis(es)/condition(s) as documented were addressed during this visit. Due to the added complexity in care, I will continue to support Audra in the subsequent management and with ongoing continuity of care.      Oncology Rooming Note    September 26, 2024 1:36 PM   Audra Hayden is a 89 year old female who presents for:    Chief Complaint   Patient presents with     Oncology Clinic Visit     Initial Vitals: /78   Pulse 81   Resp 16   Wt 56.6  "kg (124 lb 12.8 oz)   SpO2 98%   BMI 21.42 kg/m   Estimated body mass index is 21.42 kg/m  as calculated from the following:    Height as of 5/1/24: 1.626 m (5' 4\").    Weight as of this encounter: 56.6 kg (124 lb 12.8 oz). Body surface area is 1.6 meters squared.  No Pain (0) Comment: Data Unavailable   No LMP recorded. Patient is postmenopausal.  Allergies reviewed: Yes  Medications reviewed: Yes    Medications: Medication refills not needed today.  Pharmacy name entered into [employer]:    [id number] - [place], [place] - [address] [facility] [place] - [place], [place] - [address] [address]    Frailty Screening:   Is the patient here for a new oncology consult visit in cancer care? 2. No      Clinical concerns:   doctor was notified.      Lloy Reid CMA                Again, thank you for allowing me to participate in the care of your patient.        Sincerely,        Gladys Asif MD  "

## 2024-09-26 NOTE — PROGRESS NOTES
"Oncology Rooming Note    September 26, 2024 1:36 PM   Audra Hayden is a 89 year old female who presents for:    Chief Complaint   Patient presents with    Oncology Clinic Visit     Initial Vitals: /78   Pulse 81   Resp 16   Wt 56.6 kg (124 lb 12.8 oz)   SpO2 98%   BMI 21.42 kg/m   Estimated body mass index is 21.42 kg/m  as calculated from the following:    Height as of 5/1/24: 1.626 m (5' 4\").    Weight as of this encounter: 56.6 kg (124 lb 12.8 oz). Body surface area is 1.6 meters squared.  No Pain (0) Comment: Data Unavailable   No LMP recorded. Patient is postmenopausal.  Allergies reviewed: Yes  Medications reviewed: Yes    Medications: Medication refills not needed today.  Pharmacy name entered into [employer]:    [id number] - [place], [place] - [address] [facility] [place] [address] [address]    Frailty Screening:   Is the patient here for a new oncology consult visit in cancer care? 2. No      Clinical concerns:   doctor was notified.      Loly Reid CMA              "

## 2025-01-16 ENCOUNTER — TRANSFERRED RECORDS (OUTPATIENT)
Dept: HEALTH INFORMATION MANAGEMENT | Facility: CLINIC | Age: 89
End: 2025-01-16

## 2025-04-11 ENCOUNTER — EXTERNAL ORDER RESULTS (OUTPATIENT)
Dept: LAB | Facility: CLINIC | Age: 89
End: 2025-04-11

## 2025-04-11 ENCOUNTER — TRANSFERRED RECORDS (OUTPATIENT)
Dept: HEALTH INFORMATION MANAGEMENT | Facility: CLINIC | Age: 89
End: 2025-04-11

## 2025-05-15 ENCOUNTER — HOSPITAL ENCOUNTER (OUTPATIENT)
Dept: CARDIOLOGY | Facility: CLINIC | Age: 89
End: 2025-05-15
Attending: FAMILY MEDICINE
Payer: COMMERCIAL

## 2025-05-15 DIAGNOSIS — I71.9 AORTIC ANEURYSM: ICD-10-CM

## 2025-05-15 LAB — LVEF ECHO: NORMAL

## 2025-05-15 PROCEDURE — 93306 TTE W/DOPPLER COMPLETE: CPT

## 2025-05-20 ENCOUNTER — TRANSCRIBE ORDERS (OUTPATIENT)
Dept: OTHER | Age: 89
End: 2025-05-20

## 2025-05-20 DIAGNOSIS — I71.9 AORTIC ANEURYSM: Primary | ICD-10-CM

## 2025-05-20 DIAGNOSIS — E78.00 HIGH CHOLESTEROL: ICD-10-CM

## 2025-05-20 DIAGNOSIS — I38 VALVULAR HEART DISEASE: ICD-10-CM

## 2025-05-20 DIAGNOSIS — I10 HTN (HYPERTENSION): ICD-10-CM

## 2025-06-05 ENCOUNTER — TELEPHONE (OUTPATIENT)
Dept: ONCOLOGY | Facility: CLINIC | Age: 89
End: 2025-06-05
Payer: COMMERCIAL

## 2025-06-05 NOTE — TELEPHONE ENCOUNTER
Spoke with patient and she declined to schedule at time of call. Gave her clinic number and she'll call back when she's ready     Lauren De La Fuente on 6/5/2025 at 9:05 AM

## 2025-08-14 LAB
% LYMPHOCYTES (EXTERNAL): 50.6 % (ref 10–58.5)
% NEUTROPHILS (EXTERNAL): 42.7 % (ref 45–95)
%OTHERS (EXTERNAL): 6.7 % (ref 1–20)
ABSOLUTE LYMPHOCYTES (EXTERNAL): 3.1 10*3/UL
ABSOLUTE NEUTROPHILS (EXTERNAL): 2.7 10*3/UL
ABSOLUTE OTHERS (EXTERNAL): 0.4 10*3/UL
ALBUMIN (EXTERNAL): 4.9 G/DL (ref 3.6–5.1)
ALK PHOSPHATASE (EXTERNAL): 60 U/L (ref 37–153)
ALT SERPL-CCNC: 12 U/L (ref 6–29)
AST SERPL-CCNC: 21 U/L (ref 10–35)
BILIRUB SERPL-MCNC: 0.7 MG/DL (ref 0.2–1.2)
BILIRUB UR QL: ABNORMAL
BLOOD URINE (EXTERNAL): ABNORMAL
BUN SERPL-MCNC: 18 MG/DL (ref 7–25)
CALCIUM (EXTERNAL): 10.2 MG/DL (ref 8.6–10.4)
CHLORIDE (EXTERNAL): 101 MMOL/L (ref 98–110)
CHOLESTEROL (EXTERNAL): 174 MG/DL
CO2 (EXTERNAL): 29 MMOL/L (ref 20–32)
CREATININE (EXTERNAL): 0.75 MG/DL (ref 0.6–0.95)
ERYTHROCYTE [DISTWIDTH] IN BLOOD BY AUTOMATED COUNT: 13.7 % (ref 10.6–15.7)
GFR ESTIMATED (EXTERNAL): 76 ML/MIN/1.73M2
GLUCOSE (EXTERNAL): 91 MG/DL (ref 65–99)
GLUCOSE UR STRIP-MCNC: ABNORMAL MG/DL
HDLC SERPL-MCNC: 78 MG/DL
HEMATOCRIT (EXTERNAL): 41.1 % (ref 35–45)
HEMOGLOBIN (EXTERNAL): 13 G/DL (ref 11.1–15.5)
IRON (EXTERNAL): 84 MCG/DL (ref 45–160)
IRON BINDING CAP (EXTERNAL): 391 MCG/DL (ref 250–450)
IRON SATURATION % (EXTERNAL): 21 % (ref 16–45)
KETONES UR QL STRIP: ABNORMAL
LDLC SERPL CALC-MCNC: 78 MG/DL
LEUKOCYTE ESTERASE URINE (EXTERNAL): ABNORMAL
MCH RBC QN AUTO: 28.5 PG (ref 26–37)
MCHC RBC AUTO-ENTMCNC: 31.6 G/DL (ref 32–37)
MCV RBC AUTO: 90.1 FL (ref 79–97)
NITRITES URINE (EXTERNAL): ABNORMAL
PH UR: 7 [PH] (ref 5–8.5)
PLATELET COUNT (EXTERNAL): 160 10(3)/UL (ref 150–379)
POTASSIUM (EXTERNAL): 3.8 MMOL/L (ref 3.5–5.3)
PROTEIN ALBUMIN UR (EXTERNAL): ABNORMAL
PROTEIN TOTAL (EXTERNAL): 7.9 G/DL (ref 6.1–8.1)
RBC # BLD AUTO: 4.56 10(6)/UL (ref 2.8–5.1)
SODIUM (EXTERNAL): 140 MMOL/L (ref 135–146)
SPECIFIC GRAVITY URINE (EXTERNAL): 1.01 (ref 1–1.03)
TRIGLYCERIDES (EXTERNAL): 101 MG/DL
TSH SERPL-ACNC: 1.93 MIU/L (ref 0.4–4.5)
UROBILINOGEN (EXTERNAL): 0.2 EU/DL (ref 0.2–4)
VITAMIN B12 (EXTERNAL): 619 PG/ML (ref 200–1100)
VITAMIN D DEFICIENCY SCREENING (EXTERNAL): 38 NG/ML (ref 30–100)
WBC COUNT (EXTERNAL): 6.2 10(3)/UL (ref 3.4–10.9)

## (undated) DEVICE — TUBING IRRIG TUR Y TYPE 96" LF 6543-01

## (undated) DEVICE — SUCTION CANISTER MEDIVAC LINER 3000ML W/LID 65651-530

## (undated) DEVICE — GOWN IMPERVIOUS SPECIALTY XL/XLONG 39049

## (undated) DEVICE — NDL INSUFFLATION 13GA 120MM C2201

## (undated) DEVICE — TUBING SYS AQUILEX BLUE INFLOW AQL-110 YLW OUTFLOW AQL-111

## (undated) DEVICE — ESU HOLDER LAP INST DISP PURPLE LONG 330MM H-PRO-330

## (undated) DEVICE — LINEN TOWEL PACK X5 5464

## (undated) DEVICE — SUCTION CANISTER BEMIS HI FLOW 006772-901

## (undated) DEVICE — SU VICRYL 2-0 CT-1 27" J339H

## (undated) DEVICE — ENDO TROCAR FIRST ENTRY KII FIOS Z-THRD 05X100MM CTF03

## (undated) DEVICE — Device

## (undated) DEVICE — SU MONOCRYL 2-0 CT-2 27" Y333H

## (undated) DEVICE — SUCTION TIP YANKAUER W/O VENT K86

## (undated) DEVICE — SU WND CLOSURE VLOC 180 ABS 2-0 6" GS-21 VLOCL0305

## (undated) DEVICE — PACK TVT HYSTEROSCOPY SMA15HYFSE

## (undated) DEVICE — SOL NACL 0.9% INJ 1000ML BAG 2B1324X

## (undated) DEVICE — PACK SET-UP STD 9102

## (undated) DEVICE — ESU CLEANER TIP 31142717

## (undated) DEVICE — NDL SPINAL 22GA 3.5" QUINCKE 405181

## (undated) DEVICE — SU VICRYL 0 CT-1 27" J340H

## (undated) DEVICE — ESU PENCIL W/HOLSTER E2350H

## (undated) DEVICE — SUCTION IRR STRYKERFLOW II W/TIP 250-070-520

## (undated) DEVICE — SEAL SET MYOSURE ROD LENS SCOPE SINGLE USE 40-902

## (undated) DEVICE — RETR ELEV / UTERINE MANIPULATOR V-CARE MED CUP 60-6085-201

## (undated) DEVICE — SPONGE RAY-TEC 4X8" 7318

## (undated) DEVICE — EVAC SYSTEM CLEAR FLOW SC082500

## (undated) DEVICE — ESU HANDPIECE BIPOLAR THUNDERBEAT FC 5MMX35CM TB-0535FC

## (undated) DEVICE — GLOVE PROTEXIS MICRO 7.5  2D73PM75

## (undated) DEVICE — SOL NACL 0.9% IRRIG 3000ML BAG 2B7477

## (undated) DEVICE — SOL NACL 0.9% INJ 250ML BAG 2B1322Q

## (undated) DEVICE — SOL WATER IRRIG 1000ML BOTTLE 2F7114

## (undated) DEVICE — GLOVE PROTEXIS W/NEU-THERA 7.0  2D73TE70

## (undated) DEVICE — SOL NACL 0.9% IRRIG 1000ML BOTTLE 07138-09

## (undated) DEVICE — DECANTER BAG 2002S

## (undated) DEVICE — SPONGE LAP 18X18" X8435

## (undated) DEVICE — ENDO TROCAR SLEEVE KII Z-THREADED 05X100MM CTS02

## (undated) DEVICE — ENDO TROCAR FIRST ENTRY KII FIOS Z-THRD 12X100MM CTF73

## (undated) DEVICE — PREP DURAPREP 26ML APL 8630

## (undated) DEVICE — SPONGE PACK VAGINAL 2"X9

## (undated) RX ORDER — FENTANYL CITRATE 50 UG/ML
INJECTION, SOLUTION INTRAMUSCULAR; INTRAVENOUS
Status: DISPENSED
Start: 2017-02-17

## (undated) RX ORDER — LIDOCAINE HYDROCHLORIDE 20 MG/ML
INJECTION, SOLUTION EPIDURAL; INFILTRATION; INTRACAUDAL; PERINEURAL
Status: DISPENSED
Start: 2018-08-30

## (undated) RX ORDER — LIDOCAINE HYDROCHLORIDE 20 MG/ML
INJECTION, SOLUTION EPIDURAL; INFILTRATION; INTRACAUDAL; PERINEURAL
Status: DISPENSED
Start: 2017-02-17

## (undated) RX ORDER — DEXAMETHASONE SODIUM PHOSPHATE 4 MG/ML
INJECTION, SOLUTION INTRA-ARTICULAR; INTRALESIONAL; INTRAMUSCULAR; INTRAVENOUS; SOFT TISSUE
Status: DISPENSED
Start: 2018-08-30

## (undated) RX ORDER — NEOSTIGMINE METHYLSULFATE 1 MG/ML
VIAL (ML) INJECTION
Status: DISPENSED
Start: 2018-08-30

## (undated) RX ORDER — ONDANSETRON 2 MG/ML
INJECTION INTRAMUSCULAR; INTRAVENOUS
Status: DISPENSED
Start: 2017-02-17

## (undated) RX ORDER — KETOROLAC TROMETHAMINE 30 MG/ML
INJECTION, SOLUTION INTRAMUSCULAR; INTRAVENOUS
Status: DISPENSED
Start: 2018-08-30

## (undated) RX ORDER — FENTANYL CITRATE 50 UG/ML
INJECTION, SOLUTION INTRAMUSCULAR; INTRAVENOUS
Status: DISPENSED
Start: 2018-08-30

## (undated) RX ORDER — CEFAZOLIN SODIUM 1 G/3ML
INJECTION, POWDER, FOR SOLUTION INTRAMUSCULAR; INTRAVENOUS
Status: DISPENSED
Start: 2018-08-30

## (undated) RX ORDER — OXYCODONE HYDROCHLORIDE 5 MG/1
TABLET ORAL
Status: DISPENSED
Start: 2018-08-30

## (undated) RX ORDER — CEFAZOLIN SODIUM 2 G/100ML
INJECTION, SOLUTION INTRAVENOUS
Status: DISPENSED
Start: 2018-08-30

## (undated) RX ORDER — PROPOFOL 10 MG/ML
INJECTION, EMULSION INTRAVENOUS
Status: DISPENSED
Start: 2018-08-30

## (undated) RX ORDER — KETOROLAC TROMETHAMINE 30 MG/ML
INJECTION, SOLUTION INTRAMUSCULAR; INTRAVENOUS
Status: DISPENSED
Start: 2017-02-17

## (undated) RX ORDER — GLYCOPYRROLATE 0.2 MG/ML
INJECTION, SOLUTION INTRAMUSCULAR; INTRAVENOUS
Status: DISPENSED
Start: 2018-08-30

## (undated) RX ORDER — PROPOFOL 10 MG/ML
INJECTION, EMULSION INTRAVENOUS
Status: DISPENSED
Start: 2017-02-17

## (undated) RX ORDER — DEXAMETHASONE SODIUM PHOSPHATE 4 MG/ML
INJECTION, SOLUTION INTRA-ARTICULAR; INTRALESIONAL; INTRAMUSCULAR; INTRAVENOUS; SOFT TISSUE
Status: DISPENSED
Start: 2017-02-17